# Patient Record
Sex: MALE | Race: WHITE | NOT HISPANIC OR LATINO | Employment: OTHER | URBAN - METROPOLITAN AREA
[De-identification: names, ages, dates, MRNs, and addresses within clinical notes are randomized per-mention and may not be internally consistent; named-entity substitution may affect disease eponyms.]

---

## 2022-05-10 ENCOUNTER — TRANSFERRED RECORDS (OUTPATIENT)
Dept: MULTI SPECIALTY CLINIC | Facility: CLINIC | Age: 62
End: 2022-05-10

## 2023-03-20 ENCOUNTER — OFFICE VISIT (OUTPATIENT)
Dept: FAMILY MEDICINE | Facility: CLINIC | Age: 63
End: 2023-03-20
Payer: COMMERCIAL

## 2023-03-20 VITALS
WEIGHT: 271.8 LBS | TEMPERATURE: 98.8 F | HEIGHT: 72 IN | HEART RATE: 65 BPM | RESPIRATION RATE: 18 BRPM | SYSTOLIC BLOOD PRESSURE: 134 MMHG | DIASTOLIC BLOOD PRESSURE: 82 MMHG | BODY MASS INDEX: 36.82 KG/M2 | OXYGEN SATURATION: 97 %

## 2023-03-20 DIAGNOSIS — E11.21 TYPE 2 DIABETES MELLITUS WITH DIABETIC NEPHROPATHY, WITHOUT LONG-TERM CURRENT USE OF INSULIN (H): Primary | ICD-10-CM

## 2023-03-20 DIAGNOSIS — G47.33 OBSTRUCTIVE SLEEP APNEA SYNDROME: ICD-10-CM

## 2023-03-20 DIAGNOSIS — F19.11 HISTORY OF SUBSTANCE ABUSE (H): ICD-10-CM

## 2023-03-20 DIAGNOSIS — Z85.820 HX OF MALIGNANT MELANOMA: ICD-10-CM

## 2023-03-20 DIAGNOSIS — F11.10 MILD OPIOID USE DISORDER (H): ICD-10-CM

## 2023-03-20 DIAGNOSIS — E66.01 MORBID OBESITY (H): ICD-10-CM

## 2023-03-20 LAB
CHOLEST SERPL-MCNC: 154 MG/DL
HBA1C MFR BLD: 6.9 % (ref 0–5.6)
HDLC SERPL-MCNC: 53 MG/DL
LDLC SERPL CALC-MCNC: 88 MG/DL
NONHDLC SERPL-MCNC: 101 MG/DL
TRIGL SERPL-MCNC: 66 MG/DL

## 2023-03-20 PROCEDURE — 99204 OFFICE O/P NEW MOD 45 MIN: CPT | Performed by: PHYSICIAN ASSISTANT

## 2023-03-20 PROCEDURE — 83036 HEMOGLOBIN GLYCOSYLATED A1C: CPT | Performed by: PHYSICIAN ASSISTANT

## 2023-03-20 PROCEDURE — 80061 LIPID PANEL: CPT | Performed by: PHYSICIAN ASSISTANT

## 2023-03-20 PROCEDURE — 36415 COLL VENOUS BLD VENIPUNCTURE: CPT | Performed by: PHYSICIAN ASSISTANT

## 2023-03-20 RX ORDER — BUPRENORPHINE AND NALOXONE 8; 2 MG/1; MG/1
1 FILM, SOLUBLE BUCCAL; SUBLINGUAL 3 TIMES DAILY
COMMUNITY
End: 2023-06-23

## 2023-03-20 RX ORDER — GABAPENTIN 300 MG/1
300 CAPSULE ORAL PRN
COMMUNITY
End: 2023-08-02

## 2023-03-20 ASSESSMENT — PAIN SCALES - GENERAL: PAINLEVEL: NO PAIN (0)

## 2023-03-20 NOTE — PROGRESS NOTES
"  Assessment & Plan     Type 2 diabetes mellitus with diabetic nephropathy, without long-term current use of insulin (H)  May be a candidate for statin if LDL above 70.  Will await labs  - Continuous Blood Gluc  (FREESTYLE VANNESA 2 READER) NANCY; Inject 1 each Subcutaneous once for 1 dose Use to read blood sugars per 's instructions.  - Continuous Blood Gluc Sensor (FREESTYLE VANNESA 2 SENSOR) MISC; 1 each by Subdermal route every 14 days Use 1 sensor every 14 days. Use to read blood sugars per 's instructions.  - Hemoglobin A1c; Future  - Lipid panel reflex to direct LDL Fasting; Future  - Lipid panel reflex to direct LDL Fasting  - Hemoglobin A1c    Obstructive sleep apnea syndrome    - Adult Sleep Eval & Management  Referral; Future    History of substance abuse (H)  7+ years being sober.  On suboxone therapy    Morbid obesity (H)  Discussed dietary and lifestyle changes    Mild opioid use disorder (H)  Discussed suboxone.  We may have a new protocol in place where we are able to continue.  I will look into and confirm with pt    Hx of malignant melanoma  Follows with derm               BMI:   Estimated body mass index is 37.34 kg/m  as calculated from the following:    Height as of this encounter: 1.817 m (5' 11.54\").    Weight as of this encounter: 123.3 kg (271 lb 12.8 oz).   Weight management plan: Discussed healthy diet and exercise guidelines        Cory Bond PA-C  [facility]    Latrice Duenas is a 62 year old, presenting for the following health issues:  Hypertension, Diabetes, Back Pain, and Establish Care      History of Present Illness       Back Pain:  He presents for follow up of back pain. Patient's back pain is a chronic problem.  Location of back pain:  Right lower back and left lower back  Description of back pain: fullness and stabbing  Back pain spreads: nowhere    Since patient first noticed back pain, pain is: gradually " "improving  Does back pain interfere with his job:  Yes      Diabetes:   He presents for follow up of diabetes.  He is not checking blood glucose. He is concerned about other.  He is having numbness in feet.         Hypertension: He presents for follow up of hypertension.  He does not check blood pressure  regularly outside of the clinic. Outside blood pressures have been over 140/90. He does not follow a low salt diet.     He eats 0-1 servings of fruits and vegetables daily.He consumes 1 sweetened beverage(s) daily.He exercises with enough effort to increase his heart rate 9 or less minutes per day.  He exercises with enough effort to increase his heart rate 3 or less days per week. He is missing 1 dose(s) of medications per week.  He is not taking prescribed medications regularly due to remembering to take.             Review of Systems   Constitutional, HEENT, cardiovascular, pulmonary, gi and gu systems are negative, except as otherwise noted.      Objective    /82   Pulse 65   Temp 98.8  F (37.1  C) (Temporal)   Resp 18   Ht 1.817 m (5' 11.54\")   Wt 123.3 kg (271 lb 12.8 oz)   SpO2 97%   BMI 37.34 kg/m    Body mass index is 37.34 kg/m .  Physical Exam   GENERAL: alert and no distress  EYES: Eyes grossly normal to inspection  RESP: lungs clear to auscultation - no rales, rhonchi or wheezes  CV: regular rate and rhythm, normal S1 S2, no S3 or S4, no murmur, click or rub, no peripheral edema and peripheral pulses strong                    "

## 2023-03-23 ENCOUNTER — TELEPHONE (OUTPATIENT)
Dept: FAMILY MEDICINE | Facility: CLINIC | Age: 63
End: 2023-03-23
Payer: COMMERCIAL

## 2023-03-23 NOTE — TELEPHONE ENCOUNTER
Patient Returning Call    Reason for call:  Patient is wondering if he can get a new mask before his appt for a consult? He took the first available appt but his mask is falling apart    Information relayed to patient:   I let him know that I would get this message over to the sleep clinic and someone would be in touch with him regarding the mask    Patient has additional questions:  No    What are your questions/concerns:      Okay to leave a detailed message?: Yes at Home number on file [phone] (home)

## 2023-03-24 ENCOUNTER — TELEPHONE (OUTPATIENT)
Dept: FAMILY MEDICINE | Facility: CLINIC | Age: 63
End: 2023-03-24
Payer: COMMERCIAL

## 2023-03-24 DIAGNOSIS — E11.21 TYPE 2 DIABETES MELLITUS WITH DIABETIC NEPHROPATHY, WITHOUT LONG-TERM CURRENT USE OF INSULIN (H): Primary | ICD-10-CM

## 2023-03-24 PROBLEM — Z85.820 HX OF MALIGNANT MELANOMA: Status: ACTIVE | Noted: 2023-03-24

## 2023-03-24 PROBLEM — G47.33 OBSTRUCTIVE SLEEP APNEA SYNDROME: Status: ACTIVE | Noted: 2023-03-24

## 2023-03-24 PROBLEM — F19.11 HISTORY OF SUBSTANCE ABUSE (H): Status: ACTIVE | Noted: 2023-03-24

## 2023-03-24 PROBLEM — E66.01 MORBID OBESITY (H): Status: ACTIVE | Noted: 2023-03-24

## 2023-03-24 PROBLEM — F11.10 MILD OPIOID USE DISORDER (H): Status: ACTIVE | Noted: 2023-03-24

## 2023-03-24 RX ORDER — ATORVASTATIN CALCIUM 10 MG/1
10 TABLET, FILM COATED ORAL DAILY
Qty: 90 TABLET | Refills: 3 | Status: SHIPPED | OUTPATIENT
Start: 2023-03-24 | End: 2023-07-05

## 2023-03-24 NOTE — RESULT ENCOUNTER NOTE
Please call with results.    A1c at goal at 6.9.  while cholesterol looks good, then recommendation is that all diabetics be on a low dose cholesterol medication.  Studies show that it helps prevent diabetic related complications.  I can call this in if he wishes to try.    I also looked into suboxone, and we can take over.  He will (unfortunately) need another in person visit to complete the requirements, so he can schedule that as well.    Roberto Bond PA-C

## 2023-03-24 NOTE — TELEPHONE ENCOUNTER
JS,    Updated patient   He would like to start the cholesterol medication  Also he scheduled for an appointment in April regarding Subxone  He has suboxone at home at the moment          Thanks,  Costa MOHR RN   [facility]

## 2023-03-24 NOTE — TELEPHONE ENCOUNTER
Cory Bond PA-C  P Up Triage  Please call with results.     A1c at goal at 6.9.  while cholesterol looks good, then recommendation is that all diabetics be on a low dose cholesterol medication.  Studies show that it helps prevent diabetic related complications.  I can call this in if he wishes to try.     I also looked into suboxone, and we can take over.  He will (unfortunately) need another in person visit to complete the requirements, so he can schedule that as well.     Roberto Bond PA-C

## 2023-03-29 NOTE — TELEPHONE ENCOUNTER
Spoke with patient. Advised provider is not able to write prescription for supplies prior to being seen. He will check with his supplier to see if he can pay out of pocket.     Nette BYRNE RN  [facility]

## 2023-04-20 ENCOUNTER — OFFICE VISIT (OUTPATIENT)
Dept: FAMILY MEDICINE | Facility: CLINIC | Age: 63
End: 2023-04-20
Payer: COMMERCIAL

## 2023-04-20 VITALS
TEMPERATURE: 97.9 F | SYSTOLIC BLOOD PRESSURE: 138 MMHG | HEIGHT: 72 IN | HEART RATE: 67 BPM | WEIGHT: 272 LBS | DIASTOLIC BLOOD PRESSURE: 82 MMHG | OXYGEN SATURATION: 97 % | BODY MASS INDEX: 36.84 KG/M2

## 2023-04-20 DIAGNOSIS — F11.20 OPIOID USE DISORDER, SEVERE, ON MAINTENANCE THERAPY (H): Primary | ICD-10-CM

## 2023-04-20 DIAGNOSIS — E11.21 TYPE 2 DIABETES MELLITUS WITH DIABETIC NEPHROPATHY, WITHOUT LONG-TERM CURRENT USE OF INSULIN (H): ICD-10-CM

## 2023-04-20 PROCEDURE — 99213 OFFICE O/P EST LOW 20 MIN: CPT | Performed by: PHYSICIAN ASSISTANT

## 2023-04-20 RX ORDER — BUPRENORPHINE AND NALOXONE 8; 2 MG/1; MG/1
1 FILM, SOLUBLE BUCCAL; SUBLINGUAL 3 TIMES DAILY
Qty: 90 FILM | Refills: 0 | Status: CANCELLED | OUTPATIENT
Start: 2023-04-20

## 2023-04-20 RX ORDER — BUPRENORPHINE AND NALOXONE 8; 2 MG/1; MG/1
1 FILM, SOLUBLE BUCCAL; SUBLINGUAL 3 TIMES DAILY
Qty: 90 FILM | Refills: 0 | Status: SHIPPED | OUTPATIENT
Start: 2023-04-20 | End: 2023-05-23

## 2023-04-20 ASSESSMENT — PAIN SCALES - GENERAL: PAINLEVEL: NO PAIN (0)

## 2023-04-20 NOTE — PROGRESS NOTES
Assessment & Plan     Opioid use disorder, severe, on maintenance therapy (H)    - buprenorphine HCl-naloxone HCl (SUBOXONE) 8-2 MG per film; Place 1 Film under the tongue 3 times daily    Type 2 diabetes mellitus with diabetic nephropathy, without long-term current use of insulin (H)  Has follow up in July , will update labs/urine              [facility]    Latrice Duenas is a 62 year old, presenting for the following health issues:  Recheck Medication         View : No data to display.              History of Present Illness       Reason for visit:  Med check    He eats 0-1 servings of fruits and vegetables daily.He consumes 1 sweetened beverage(s) daily.He exercises with enough effort to increase his heart rate 9 or less minutes per day.  He exercises with enough effort to increase his heart rate 3 or less days per week. He is missing 1 dose(s) of medications per week.  He is not taking prescribed medications regularly due to cost of medication and remembering to take.         Current Narcotic Use/History:  Which opioid(s) are you currently using, that are not already on your med list?: none ( been off for 8 years) ( takes suboxone)  How do you use your drug of choice? none  What is your estimated total dose (mg if pills, grams of heroin) per day? none  When did you last use? 01/06/2015  Have you ever tried to quit on your own? treatment  What have you done to try quiting in the past? treatment  What was the longest period of time you have been sober from opioids?: 8 years  When and how did you start using opioids? Back injury    Other Substance/Psychiatric History:  Have you been struggling with any other mental health symptoms?: depression  Any other drug use other than opioids?: None  Do you struggle with any other addictive behaviors (sex, gambling, internet, shopping, TV etc): food,gambling  Are you sexually active?: no       Family History:  Does  "anyone in your family have a history of a use disorder? Father - alcohol     Opioid Use Disorder Criteria:        View : No data to display.              PHQ Score:       View : No data to display.              GAD7 Score:        View : No data to display.                  PDMP Review       Value Time User    State PDMP site checked  Yes 4/20/2023  4:43 PM Cory Bond PA-C                  Review of Systems   Constitutional, HEENT, cardiovascular, pulmonary, gi and gu systems are negative, except as otherwise noted.      Objective    /82 (BP Location: Right arm, Patient Position: Sitting, Cuff Size: Adult Regular)   Pulse 67   Temp 97.9  F (36.6  C) (Temporal)   Ht 1.825 m (5' 11.85\")   Wt 123.4 kg (272 lb)   SpO2 97%   BMI 37.04 kg/m    Body mass index is 37.04 kg/m .  Physical Exam   GENERAL: alert and no distress  EYES: Eyes grossly normal to inspection  RESP: lungs clear to auscultation - no rales, rhonchi or wheezes  CV: regular rate and rhythm, normal S1 S2, no S3 or S4, no murmur, click or rub, no peripheral edema and peripheral pulses strong                      "

## 2023-05-23 DIAGNOSIS — F11.20 OPIOID USE DISORDER, SEVERE, ON MAINTENANCE THERAPY (H): ICD-10-CM

## 2023-05-23 RX ORDER — BUPRENORPHINE AND NALOXONE 8; 2 MG/1; MG/1
FILM, SOLUBLE BUCCAL; SUBLINGUAL
Qty: 90 FILM | Refills: 0 | Status: SHIPPED | OUTPATIENT
Start: 2023-05-23 | End: 2023-07-20

## 2023-06-23 DIAGNOSIS — F11.20 OPIOID USE DISORDER, SEVERE, ON MAINTENANCE THERAPY (H): Primary | ICD-10-CM

## 2023-06-23 RX ORDER — BUPRENORPHINE AND NALOXONE 8; 2 MG/1; MG/1
1 FILM, SOLUBLE BUCCAL; SUBLINGUAL 3 TIMES DAILY
Qty: 90 FILM | Refills: 0 | Status: SHIPPED | OUTPATIENT
Start: 2023-06-23 | End: 2023-07-05

## 2023-06-23 NOTE — TELEPHONE ENCOUNTER
Requested Prescriptions   Pending Prescriptions Disp Refills     buprenorphine HCl-naloxone HCl (SUBOXONE) 8-2 MG per film       Sig: Place 1 Film under the tongue 3 times daily       There is no refill protocol information for this order        Routing refill request to provider for review/approval because:  Drug not on the [facility] refill protocol     Luis Daniel Phoenix RN  [facility]

## 2023-07-05 ENCOUNTER — OFFICE VISIT (OUTPATIENT)
Dept: SLEEP MEDICINE | Facility: CLINIC | Age: 63
End: 2023-07-05
Attending: PHYSICIAN ASSISTANT
Payer: COMMERCIAL

## 2023-07-05 VITALS
HEIGHT: 72 IN | SYSTOLIC BLOOD PRESSURE: 162 MMHG | DIASTOLIC BLOOD PRESSURE: 84 MMHG | WEIGHT: 267.6 LBS | OXYGEN SATURATION: 96 % | BODY MASS INDEX: 36.24 KG/M2 | HEART RATE: 83 BPM

## 2023-07-05 DIAGNOSIS — G47.33 OBSTRUCTIVE SLEEP APNEA SYNDROME: Primary | ICD-10-CM

## 2023-07-05 PROCEDURE — 99204 OFFICE O/P NEW MOD 45 MIN: CPT | Performed by: INTERNAL MEDICINE

## 2023-07-05 ASSESSMENT — SLEEP AND FATIGUE QUESTIONNAIRES
HOW LIKELY ARE YOU TO NOD OFF OR FALL ASLEEP IN A CAR, WHILE STOPPED FOR A FEW MINUTES IN TRAFFIC: SLIGHT CHANCE OF DOZING
HOW LIKELY ARE YOU TO NOD OFF OR FALL ASLEEP WHILE SITTING QUIETLY AFTER LUNCH WITHOUT ALCOHOL: SLIGHT CHANCE OF DOZING
HOW LIKELY ARE YOU TO NOD OFF OR FALL ASLEEP WHILE LYING DOWN TO REST IN THE AFTERNOON WHEN CIRCUMSTANCES PERMIT: SLIGHT CHANCE OF DOZING
HOW LIKELY ARE YOU TO NOD OFF OR FALL ASLEEP WHEN YOU ARE A PASSENGER IN A CAR FOR AN HOUR WITHOUT A BREAK: MODERATE CHANCE OF DOZING
HOW LIKELY ARE YOU TO NOD OFF OR FALL ASLEEP WHILE SITTING AND TALKING TO SOMEONE: SLIGHT CHANCE OF DOZING
HOW LIKELY ARE YOU TO NOD OFF OR FALL ASLEEP WHILE WATCHING TV: MODERATE CHANCE OF DOZING
HOW LIKELY ARE YOU TO NOD OFF OR FALL ASLEEP WHILE SITTING AND READING: MODERATE CHANCE OF DOZING
HOW LIKELY ARE YOU TO NOD OFF OR FALL ASLEEP WHILE SITTING INACTIVE IN A PUBLIC PLACE: MODERATE CHANCE OF DOZING

## 2023-07-05 NOTE — PROGRESS NOTES
Sleep Consultation:    Date on this visit: 7/5/2023    Henrique Nathan  is referred by Cory Bond for a sleep consultation.     Primary Physician: Cory Bond     Henrique Nathan presents to clinic for management of sleep apnea.     His medical history is significant for elevated blood pressure, DM-2, and h/o opiate dependence, in remission.     Patient was diagnosed with severe obstructive sleep apnea on PSG from [facility].  PSG on 6/27/2016 showed an apnea-hypopnea index of 54/h.  CPAP titration was effective at 8 cm H2O but did not include supine REM.  Record of a titration PSG on 2/18/2021, which showed effective titration at 8 cm H2O including supine REM.    Patient is currently prescribed auto CPAP therapy with a pressure range of 8 to 10 cm H2O.    Overall, he rates the experience with PAP as ok. The mask is not comfortable. The mask is not leaking.  He is not snoring with the mask on. He is not having gasp arousals.  He is not having significant oral/nasal dryness. The pressure settings are comfortable.     He uses full-face mask.     [employer]Med     Auto-PAP 8-10 cmH2O download:  80/90 total days of use. 10 nonuse days. 58% days with >4 hours use.  Average use 4 hours 46 minutes per day. Median Leak 6 L/min. 95%ile Leak 19.8 L/min. CPAP 95% pressure 9.5cm. AHI 1.8    Henrique goes to sleep at 11:00 PM during the week. He wakes up at 6:00 AM. He falls asleep in 60 minutes.  Henrique has difficulty falling asleep.  He wakes up 2-4 times a night for 30 minutes before falling back to sleep.  Henrique wakes up to go to the bathroom and uncertain reasons.  On weekends, Henrique goes to sleep at 12:00 AM.  He wakes up at 7:00 AM. He falls asleep in 30- 60 minutes.  Patient gets an average of 5- 6 hours of sleep per night.     Henrique denies any sleep walking, dream enactment, sleep paralysis, cataplexy and hypnogogic/hypnopompic hallucinations.     Patient's [place] Sleepiness score 12/24  "consistent with mild daytime sleepiness.      Henrique naps 0 times per week. He takes some inadvertant naps.  He denies closing eyes, dozing and falling asleep while driving. Patient was counseled on the importance of driving while alert, to pull over if drowsy, or nap before getting into the vehicle if sleepy.      He uses 1 cups/day of coffee, 1 sodas/day. Last caffeine intake is usually before 4 PM.      Problem List:  Patient Active Problem List    Diagnosis Date Noted     History of substance abuse (H) 2023     Priority: Medium     Type 2 diabetes mellitus with diabetic nephropathy, without long-term current use of insulin (H) 2023     Priority: Medium     Obstructive sleep apnea syndrome 2023     Priority: Medium     Morbid obesity (H) 2023     Priority: Medium     Mild opioid use disorder (H) 2023     Priority: Medium     Hx of malignant melanoma 2023     Priority: Medium        Social History     Tobacco Use     Smoking status: Former     Types: Cigarettes     Quit date: 10/1/2021     Years since quittin.7     Smokeless tobacco: Never   Vaping Use     Vaping Use: Never used       Physical Examination:  Vitals: BP (!) 162/84   Pulse 83   Ht 1.817 m (5' 11.54\")   Wt 121.4 kg (267 lb 9.6 oz)   SpO2 96%   BMI 36.76 kg/m    BMI= Body mass index is 36.76 kg/m .  GENERAL APPEARANCE: healthy, alert and no distress  HENT: oropharynx crowded and tongue base enlarged  NEURO: mentation intact and speech normal  PSYCH: mentation appears normal and affect normal/bright  Mallampati Class: III.  Tonsillar Stage: 1  hidden by pillars.    Impression/Plan:    1. Severe obstructive sleep apnea    -Patient is currently on auto titrating CPAP therapy with a pressure range of 8 to 10 cm H2O for treatment of severe obstructive sleep apnea.  I reviewed sleep study reports from [facility], which includes a titration PSG that showed effective titration at 8 cm H2O.  Patient is using CPAP " regularly and benefits from therapy.  Review of download data from his device shows normal residual AHI at current auto CPAP settings.  Continuation of therapy is recommended. I reviewed importance of regular adherence to CPAP and using therapy throughout the night.    Plan:     1.  Continue auto titrating CPAP therapy with a pressure range of 8 to 10 cm H2O      Obstructive sleep apnea reviewed.  Complications of untreated sleep apnea were reviewed.    I spent a total of 45 minutes for this appointment on this date of service which include time spent before, during and after the visit for chart review, patient care, counseling and coordination of care.    Dr. Trevon Briceño       CC: Cory Bond

## 2023-07-05 NOTE — NURSING NOTE
"Chief Complaint   Patient presents with     Sleep Problem     Establish care and needs new mask       Initial BP (!) 162/84   Pulse 83   Ht 1.817 m (5' 11.54\")   Wt 121.4 kg (267 lb 9.6 oz)   SpO2 96%   BMI 36.76 kg/m   Estimated body mass index is 36.76 kg/m  as calculated from the following:    Height as of this encounter: 1.817 m (5' 11.54\").    Weight as of this encounter: 121.4 kg (267 lb 9.6 oz).    Medication Reconciliation: complete  ESS 12  Neck circumference: 47 centimeters.  Cathi Ibarra MA      "

## 2023-07-20 ENCOUNTER — OFFICE VISIT (OUTPATIENT)
Dept: FAMILY MEDICINE | Facility: CLINIC | Age: 63
End: 2023-07-20
Payer: COMMERCIAL

## 2023-07-20 VITALS
DIASTOLIC BLOOD PRESSURE: 81 MMHG | OXYGEN SATURATION: 98 % | HEART RATE: 76 BPM | TEMPERATURE: 98.1 F | RESPIRATION RATE: 18 BRPM | HEIGHT: 72 IN | BODY MASS INDEX: 36 KG/M2 | SYSTOLIC BLOOD PRESSURE: 166 MMHG | WEIGHT: 265.8 LBS

## 2023-07-20 DIAGNOSIS — E11.21 TYPE 2 DIABETES MELLITUS WITH DIABETIC NEPHROPATHY, WITHOUT LONG-TERM CURRENT USE OF INSULIN (H): ICD-10-CM

## 2023-07-20 DIAGNOSIS — M79.10 MYALGIA: ICD-10-CM

## 2023-07-20 DIAGNOSIS — F11.20 OPIOID USE DISORDER, SEVERE, ON MAINTENANCE THERAPY (H): Primary | ICD-10-CM

## 2023-07-20 DIAGNOSIS — I10 HYPERTENSION, UNSPECIFIED TYPE: ICD-10-CM

## 2023-07-20 PROCEDURE — 80359 METHYLENEDIOXYAMPHETAMINES: CPT | Performed by: PHYSICIAN ASSISTANT

## 2023-07-20 PROCEDURE — 80357 KETAMINE AND NORKETAMINE: CPT | Performed by: PHYSICIAN ASSISTANT

## 2023-07-20 PROCEDURE — 80346 BENZODIAZEPINES1-12: CPT | Performed by: PHYSICIAN ASSISTANT

## 2023-07-20 PROCEDURE — 80354 DRUG SCREENING FENTANYL: CPT | Performed by: PHYSICIAN ASSISTANT

## 2023-07-20 PROCEDURE — 80355 GABAPENTIN NON-BLOOD: CPT | Performed by: PHYSICIAN ASSISTANT

## 2023-07-20 PROCEDURE — 99214 OFFICE O/P EST MOD 30 MIN: CPT | Performed by: PHYSICIAN ASSISTANT

## 2023-07-20 PROCEDURE — 80353 DRUG SCREENING COCAINE: CPT | Performed by: PHYSICIAN ASSISTANT

## 2023-07-20 PROCEDURE — 80372 DRUG SCREENING TAPENTADOL: CPT | Performed by: PHYSICIAN ASSISTANT

## 2023-07-20 PROCEDURE — 80365 DRUG SCREENING OXYCODONE: CPT | Performed by: PHYSICIAN ASSISTANT

## 2023-07-20 PROCEDURE — 80366 DRUG SCREENING PREGABALIN: CPT | Performed by: PHYSICIAN ASSISTANT

## 2023-07-20 PROCEDURE — 80324 DRUG SCREEN AMPHETAMINES 1/2: CPT | Performed by: PHYSICIAN ASSISTANT

## 2023-07-20 PROCEDURE — 82043 UR ALBUMIN QUANTITATIVE: CPT | Performed by: PHYSICIAN ASSISTANT

## 2023-07-20 PROCEDURE — 80361 OPIATES 1 OR MORE: CPT | Performed by: PHYSICIAN ASSISTANT

## 2023-07-20 PROCEDURE — 80367 DRUG SCREENING PROPOXYPHENE: CPT | Performed by: PHYSICIAN ASSISTANT

## 2023-07-20 PROCEDURE — 80363 OPIOIDS & OPIATE ANALOGS 3/4: CPT | Performed by: PHYSICIAN ASSISTANT

## 2023-07-20 PROCEDURE — 80373 DRUG SCREENING TRAMADOL: CPT | Performed by: PHYSICIAN ASSISTANT

## 2023-07-20 PROCEDURE — 80360 METHYLPHENIDATE: CPT | Performed by: PHYSICIAN ASSISTANT

## 2023-07-20 PROCEDURE — 80348 DRUG SCREENING BUPRENORPHINE: CPT | Performed by: PHYSICIAN ASSISTANT

## 2023-07-20 PROCEDURE — 83992 ASSAY FOR PHENCYCLIDINE: CPT | Performed by: PHYSICIAN ASSISTANT

## 2023-07-20 PROCEDURE — 80356 HEROIN METABOLITE: CPT | Performed by: PHYSICIAN ASSISTANT

## 2023-07-20 PROCEDURE — 82570 ASSAY OF URINE CREATININE: CPT | Performed by: PHYSICIAN ASSISTANT

## 2023-07-20 PROCEDURE — 80358 DRUG SCREENING METHADONE: CPT | Performed by: PHYSICIAN ASSISTANT

## 2023-07-20 RX ORDER — LANCETS
EACH MISCELLANEOUS
Qty: 100 EACH | Refills: 6 | Status: SHIPPED | OUTPATIENT
Start: 2023-07-20

## 2023-07-20 RX ORDER — LISINOPRIL 20 MG/1
20 TABLET ORAL DAILY
Qty: 90 TABLET | Refills: 1 | Status: SHIPPED | OUTPATIENT
Start: 2023-07-20 | End: 2024-03-12

## 2023-07-20 RX ORDER — DULOXETIN HYDROCHLORIDE 30 MG/1
30 CAPSULE, DELAYED RELEASE ORAL DAILY
Qty: 90 CAPSULE | Refills: 1 | Status: SHIPPED | OUTPATIENT
Start: 2023-07-20 | End: 2024-03-12

## 2023-07-20 RX ORDER — BUPRENORPHINE AND NALOXONE 8; 2 MG/1; MG/1
FILM, SOLUBLE BUCCAL; SUBLINGUAL
Qty: 90 FILM | Refills: 1 | Status: SHIPPED | OUTPATIENT
Start: 2023-07-20 | End: 2023-09-07

## 2023-07-20 RX ORDER — ATORVASTATIN CALCIUM 10 MG/1
10 TABLET, FILM COATED ORAL DAILY
Qty: 90 TABLET | Refills: 1 | Status: SHIPPED | OUTPATIENT
Start: 2023-07-20 | End: 2024-03-12

## 2023-07-20 ASSESSMENT — PAIN SCALES - GENERAL: PAINLEVEL: MODERATE PAIN (4)

## 2023-07-20 NOTE — PROGRESS NOTES
Assessment & Plan     Opioid use disorder, severe, on maintenance therapy (H)    - buprenorphine HCl-naloxone HCl (SUBOXONE) 8-2 MG per film; PLACE 1 FILM UNDER THE TONGUE THREE TIMES DAILY  - Drug Confirmation Panel Urine with Creat - lab collect; Future  - Drug Confirmation Panel Urine with Creat - lab collect    Type 2 diabetes mellitus with diabetic nephropathy, without long-term current use of insulin (H)    - Albumin Random Urine Quantitative with Creat Ratio; Future  - blood glucose monitoring (NO BRAND SPECIFIED) meter device kit; Use to test blood sugar 1 times daily or as directed. Preferred blood glucose meter OR supplies to accompany: Blood Glucose Monitor Brands: per insurance.  - blood glucose (NO BRAND SPECIFIED) test strip; Use to test blood sugar 1 times daily or as directed. To accompany: Blood Glucose Monitor Brands: per insurance.  - thin (NO BRAND SPECIFIED) lancets; Use with lanceting device. To accompany: Blood Glucose Monitor Brands: per insurance.  - atorvastatin (LIPITOR) 10 MG tablet; Take 1 tablet (10 mg) by mouth daily  - Albumin Random Urine Quantitative with Creat Ratio    Hypertension, unspecified type  Blood pressure elevated, will initiate treatment.  - lisinopril (ZESTRIL) 20 MG tablet; Take 1 tablet (20 mg) by mouth daily    Myalgia  He was on this in the past, and does think it helped his overall aches and pains.  - DULoxetine (CYMBALTA) 30 MG capsule; Take 1 capsule (30 mg) by mouth daily                 Cory Bond PA-C  [facility]    Latrice Duenas is a 62 year old, presenting for the following health issues:  Opioid Refill        4/20/2023     4:14 PM   Additional Questions   Roomed by darrin greenberg   Accompanied by n/a     History of Present Illness       Reason for visit:  Pain in arms blood presure  Symptom onset:  3-4 weeks ago  Symptoms include:  Pain after working  Symptom intensity:  Moderate  Symptom progression:  Staying the  "same  Had these symptoms before:  No  What makes it worse:  No  What makes it better:  Rest    He eats 0-1 servings of fruits and vegetables daily.He consumes 1 sweetened beverage(s) daily.He exercises with enough effort to increase his heart rate 10 to 19 minutes per day.  He exercises with enough effort to increase his heart rate 3 or less days per week. He is missing 1 dose(s) of medications per week.  He is not taking prescribed medications regularly due to remembering to take.         He is currently taking 24 mg of buprenorphine daily. This is taken in 3 dose(s) daily.     Status Since Last Visit:  Have you used any opioids since your last visit?: no use since last visit  Do you feel that your dose of suboxone is too high or too low? Adequate  Have there been cravings for opioids? No   Any withdrawal symptoms?  none     Any side effects from the medication?  none . Occasional sweating  Any alcohol use? none  Any other recreational drug use? none    Precipitating Factors:  Triggers have been: non-existent   Other Supports:  Do you attend counseling or meet with a therapist? Yes  Do you attend NA or AA meetings? Yes  Do you have/meet with a sponsor? No  Family and support systems have been: stable  What other goals have you been working on (job, family, relationships, etc)?         PDMP Review         Value Time User    State PDMP site checked  Yes 6/23/2023  3:06 PM Cory Bond PA-C                    Review of Systems   Constitutional, HEENT, cardiovascular, pulmonary, gi and gu systems are negative, except as otherwise noted.      Objective    BP (!) 166/81   Pulse 76   Temp 98.1  F (36.7  C) (Temporal)   Resp 18   Ht 1.817 m (5' 11.54\")   Wt 120.6 kg (265 lb 12.8 oz)   SpO2 98%   BMI 36.51 kg/m    Body mass index is 36.51 kg/m .  Physical Exam   GENERAL: alert and no distress  HENT: ear canals and TM's normal, nose and mouth without ulcers or lesions  RESP: lungs clear to auscultation - no " rales, rhonchi or wheezes  CV: regular rate and rhythm, normal S1 S2, no S3 or S4, no murmur, click or rub, no peripheral edema and peripheral pulses strong  PSYCH: mentation appears normal, affect normal/bright                     Libtayo Counseling- I discussed with the patient the risks of Libtayo including but not limited to nausea, vomiting, diarrhea, and bone or muscle pain.  The patient verbalized understanding of the proper use and possible adverse effects of Libtayo.  All of the patient's questions and concerns were addressed.

## 2023-07-21 LAB
CREAT UR-MCNC: 139 MG/DL
CREAT UR-MCNC: 139 MG/DL
MICROALBUMIN UR-MCNC: 102 MG/L
MICROALBUMIN/CREAT UR: 73.38 MG/G CR (ref 0–17)

## 2023-07-25 LAB
BUPRENORPHINE UR CFM-MCNC: 442 NG/ML
BUPRENORPHINE/CREAT UR: 318 NG/MG {CREAT}
NALOXONE UR CFM-MCNC: 1138 NG/ML
NALOXONE: 819 NG/MG {CREAT}
NORBUPRENORPHINE UR CFM-MCNC: 644 NG/ML
NORBUPRENORPHINE/CREAT UR: 463 NG/MG {CREAT}

## 2023-08-01 ENCOUNTER — TELEPHONE (OUTPATIENT)
Dept: OPHTHALMOLOGY | Facility: CLINIC | Age: 63
End: 2023-08-01
Payer: COMMERCIAL

## 2023-08-01 ENCOUNTER — NURSE TRIAGE (OUTPATIENT)
Dept: FAMILY MEDICINE | Facility: CLINIC | Age: 63
End: 2023-08-01
Payer: COMMERCIAL

## 2023-08-01 NOTE — TELEPHONE ENCOUNTER
Reason for Call:  Appointment Request    Patient requesting this type of appt:  In Person    Requested provider: Cory Bond    Reason patient unable to be scheduled: Not within requested timeframe    When does patient want to be seen/preferred time: 3-7 days    Comments: Patient called to schedule an appt with Cory Bond (PCP) for flashing in his left eye and also dermatology concerns/referral. Patient is scheduled for first available on 9/7/23 but is hoping to be seen sooner. Please call to schedule sooner if we can, thank you!    Okay to leave a detailed message?: Yes at Home number on file [phone] (home)    Call taken on 8/1/2023 at 2:27 PM by Lorraine Cannon

## 2023-08-01 NOTE — TELEPHONE ENCOUNTER
Spoke to patient  Has been experiencing L eye flashing, worse at night, for the past week  Huddled with PCP  Recommended [place] [facility] possible same day or next day appointment  Provided clinic number - [phone]  Pt will call back if needing a referral or if clinic unable to schedule today or tomorrow  Bela TRIPLETT RN

## 2023-08-01 NOTE — TELEPHONE ENCOUNTER
Reason for Disposition    Flashes of light  (Exception: brief from pressing on the eyeball)    Additional Information    Negative: Severe headache    Negative: Double vision    Negative: Blurred vision or visual changes and present now and sudden onset or new (e.g., minutes, hours, days)  (Exception: Seeing floaters / black specks OR previously diagnosed migraine headaches with same symptoms.)    Negative: Patient sounds very sick or weak to the triager    Negative: Weakness of the face, arm or leg on one side of the body    Negative: Followed getting substance in the eye    Negative: Foreign body or object is or was lodged in the eye    Negative: Followed an eye injury    Negative: Followed sun lamp or sun exposure (UV keratitis)    Negative: Yellow or green discharge (pus) in the eye    Negative: Pregnant    Negative: Complete loss of vision in 1 or both eyes    Negative: SEVERE eye pain    Protocols used: Vision Loss or Change-A-OH

## 2023-08-01 NOTE — TELEPHONE ENCOUNTER
[facility] Call Center    Phone Message    May a detailed message be left on voicemail: yes     Reason for Call: Appointment Intake    Referring Provider Name:   Diagnosis and/or Symptoms: Flashes, left eye     Per protocol writer to send te    Action Taken: Message routed to:  [facility]    Travel Screening: Not Applicable

## 2023-08-01 NOTE — TELEPHONE ENCOUNTER
Spoke to pt at 1650    New left eye flashing in left eye starting about 1-2 weeks.    Noticed more in dim light-- noticed intermittently.    H/o floaters, but no new floaters    Vision stable otherwise    --h/o DM II and pt states due for eye exam    Scheduled tomorrow in open new time slot with Dr. Wray.    Pt aware of date/time/location at [facility].    Jose A Robertson RN 4:55 PM 08/01/23      .

## 2023-08-02 ENCOUNTER — OFFICE VISIT (OUTPATIENT)
Dept: OPHTHALMOLOGY | Facility: CLINIC | Age: 63
End: 2023-08-02
Attending: OPHTHALMOLOGY
Payer: COMMERCIAL

## 2023-08-02 DIAGNOSIS — H52.4 HYPEROPIA OF BOTH EYES WITH ASTIGMATISM AND PRESBYOPIA: ICD-10-CM

## 2023-08-02 DIAGNOSIS — H04.123 DRY EYE SYNDROME OF BOTH EYES: ICD-10-CM

## 2023-08-02 DIAGNOSIS — H52.203 HYPEROPIA OF BOTH EYES WITH ASTIGMATISM AND PRESBYOPIA: ICD-10-CM

## 2023-08-02 DIAGNOSIS — D31.32 CHOROIDAL NEVUS OF LEFT EYE: Primary | ICD-10-CM

## 2023-08-02 DIAGNOSIS — E11.9 TYPE 2 DIABETES MELLITUS WITHOUT RETINOPATHY (H): ICD-10-CM

## 2023-08-02 DIAGNOSIS — H52.03 HYPEROPIA OF BOTH EYES WITH ASTIGMATISM AND PRESBYOPIA: ICD-10-CM

## 2023-08-02 PROCEDURE — 92004 COMPRE OPH EXAM NEW PT 1/>: CPT | Performed by: OPHTHALMOLOGY

## 2023-08-02 PROCEDURE — 99207 FUNDUS PHOTOS OU (BOTH EYES): CPT | Mod: 26 | Performed by: OPHTHALMOLOGY

## 2023-08-02 PROCEDURE — 92250 FUNDUS PHOTOGRAPHY W/I&R: CPT | Performed by: OPHTHALMOLOGY

## 2023-08-02 PROCEDURE — G0463 HOSPITAL OUTPT CLINIC VISIT: HCPCS | Performed by: OPHTHALMOLOGY

## 2023-08-02 PROCEDURE — 92134 CPTRZ OPH DX IMG PST SGM RTA: CPT | Performed by: OPHTHALMOLOGY

## 2023-08-02 ASSESSMENT — VISUAL ACUITY
OS_SC+: -1
METHOD: SNELLEN - LINEAR
OS_SC: 20/20
OD_SC: 20/20

## 2023-08-02 ASSESSMENT — CONF VISUAL FIELD
OD_SUPERIOR_NASAL_RESTRICTION: 0
OS_INFERIOR_TEMPORAL_RESTRICTION: 0
OD_NORMAL: 1
OS_NORMAL: 1
OD_INFERIOR_TEMPORAL_RESTRICTION: 0
METHOD: COUNTING FINGERS
OS_SUPERIOR_NASAL_RESTRICTION: 0
OS_SUPERIOR_TEMPORAL_RESTRICTION: 0
OS_INFERIOR_NASAL_RESTRICTION: 0
OD_SUPERIOR_TEMPORAL_RESTRICTION: 0
OD_INFERIOR_NASAL_RESTRICTION: 0

## 2023-08-02 ASSESSMENT — TONOMETRY
IOP_METHOD: ICARE
OS_IOP_MMHG: 20
OD_IOP_MMHG: 21

## 2023-08-02 ASSESSMENT — REFRACTION_MANIFEST
OD_SPHERE: +0.75
OS_AXIS: 175
OS_ADD: +2.50
OD_AXIS: 180
OS_SPHERE: +0.75
OD_ADD: +2.50
OD_CYLINDER: +1.00
OS_CYLINDER: +1.25

## 2023-08-02 ASSESSMENT — CUP TO DISC RATIO
OD_RATIO: 0.25
OS_RATIO: 0.25

## 2023-08-02 ASSESSMENT — SLIT LAMP EXAM - LIDS
COMMENTS: 1+ DERMATOCHALASIS
COMMENTS: 1+ DERMATOCHALASIS

## 2023-08-02 ASSESSMENT — EXTERNAL EXAM - RIGHT EYE: OD_EXAM: NORMAL

## 2023-08-02 ASSESSMENT — EXTERNAL EXAM - LEFT EYE: OS_EXAM: NORMAL

## 2023-08-02 NOTE — NURSING NOTE
Chief Complaints and History of Present Illnesses   Patient presents with    Eye Exam For Diabetes     Chief Complaint(s) and History of Present Illness(es)       Eye Exam For Diabetes              Laterality: both eyes    Quality: blurred vision    Associated symptoms: flashes and floaters.  Negative for eye pain    Treatments tried: no treatments              Comments    Henrique Nathan is a(n) 62 year old male who presents for a diabetic exam. Last eye exam was many year(s) ago. Since exam, vision is blurry. Denies using lubricating drops. Intermittent flashes and floaters x2 weeks in both eyes, left>right. No eye pain.     No hx of ocular sx.     Lab Results       Component                Value               Date                       A1C                      6.9                 03/20/2023              Chago Jayde COT 9:25 AM August 2, 2023

## 2023-08-03 NOTE — PROGRESS NOTES
HPI       Eye Exam For Diabetes    In both eyes.  Charactertized as  blurred vision.  Associated symptoms include flashes and floaters.  Negative for eye pain.  Treatments tried include no treatments.             Comments    Henrique Nathan is a(n) 62 year old male who presents for a diabetic exam. Last eye exam was many year(s) ago. Since exam, vision is blurry. Denies using lubricating drops. Intermittent flashes and floaters x2 weeks in both eyes, left>right. Lasts seconds. No eye pain.     No hx of ocular sx.     Lab Results       Component                Value               Date                       A1C                      6.9                 03/20/2023              Chago Jayde COT 9:25 AM August 2, 2023     denies family history of ocular conditions   denies history of ocular surgeries   Diabetes mellitus since ~2020    History of melanoma with excision ~2010          Last edited by Jose A Wray MD on 8/2/2023 10:43 AM.         Review of systems for the eyes was negative other than the pertinent positives/negatives listed in the HPI.      Assessment & Plan    HPI:  Henrique Nathan is a 62 year old male with history of T2DM, HTN, HLD, cutaneous melanoma of mid-back s/p excision and sentinel lymph node biopsy 1/21/13 presents for a dilated eye exam. He notes occasional flashes, left eye > right eye . Lasts seconds. No curtain coming down or up across vision. NO redness, tearing.    POHx: denies  PMHx: T2DM, HTN, HLD, cutaneous melanoma   Current Medications: atorvastatin (LIPITOR) 10 MG tablet, Take 1 tablet (10 mg) by mouth daily  blood glucose (NO BRAND SPECIFIED) test strip, Use to test blood sugar 1 times daily or as directed. To accompany: Blood Glucose Monitor Brands: per insurance.  blood glucose monitoring (NO BRAND SPECIFIED) meter device kit, Use to test blood sugar 1 times daily or as directed. Preferred blood glucose meter OR supplies to accompany: Blood Glucose Monitor Brands: per  insurance.  buprenorphine HCl-naloxone HCl (SUBOXONE) 8-2 MG per film, PLACE 1 FILM UNDER THE TONGUE THREE TIMES DAILY  DULoxetine (CYMBALTA) 30 MG capsule, Take 1 capsule (30 mg) by mouth daily  lisinopril (ZESTRIL) 20 MG tablet, Take 1 tablet (20 mg) by mouth daily  metFORMIN (GLUCOPHAGE) 500 MG tablet, Take 500 mg by mouth 2 times daily (with meals)  thin (NO BRAND SPECIFIED) lancets, Use with lanceting device. To accompany: Blood Glucose Monitor Brands: per insurance.    No current facility-administered medications on file prior to visit.    FHx: denies family history of ocular conditions   PSHx: denies history of ocular surgeries       Current Eye Medications:      Assessment & Plan:  (D31.32) Choroidal nevus of left eye  (primary encounter diagnosis)  With drusen, no orange pigment or subretinal fluid. There is slight choroidal thickening on focused oct  Given history of mid-back melanoma 2013 and symptoms of flashing left eye, will have patient see retina for possible FA  Optos and oct photos obtained today    (H52.03,  H52.203,  H52.4) Hyperopia of both eyes with astigmatism and presbyopia  Patient has minimal hyperopia that does not require treatment but a copy of today's glasses prescription was given.  Presbyopia is difficulty seeing up close and is treated with bifocals or over the counter reading glasses      (E11.9) Type 2 diabetes mellitus without retinopathy (H)  Diagnosed 2020  Most recent HgBA1c 6.9 on 3/20/23  No background diabetic retinopathy or neovascularization noted on today's exam.  Discussed ocular and systemic benefits of blood pressure and blood sugar control.  Return in 1 year for full exam with dilation or sooner if changes to vision.       (H04.123) Dry eye syndrome of both eyes  Recommend artificial tears four times a day        Return in about 6 weeks (around 9/13/2023) for retina-preferably naina, OCT Macula.        Jose A Wray MD     Attending Physician Attestation:   Complete documentation of historical and exam elements from today's encounter can be found in the full encounter summary report (not reduplicated in this progress note).  I personally obtained the chief complaint(s) and history of present illness.  I confirmed and edited as necessary the review of systems, past medical/surgical history, family history, social history, and examination findings as documented by others; and I examined the patient myself.  I personally reviewed the relevant tests, images, and reports as documented above.  I formulated and edited as necessary the assessment and plan and discussed the findings and management plan with the patient and family. - Jose A Wray MD

## 2023-09-07 ENCOUNTER — OFFICE VISIT (OUTPATIENT)
Dept: FAMILY MEDICINE | Facility: CLINIC | Age: 63
End: 2023-09-07
Payer: COMMERCIAL

## 2023-09-07 VITALS
WEIGHT: 263.5 LBS | RESPIRATION RATE: 16 BRPM | HEIGHT: 71 IN | OXYGEN SATURATION: 96 % | TEMPERATURE: 98.1 F | DIASTOLIC BLOOD PRESSURE: 81 MMHG | SYSTOLIC BLOOD PRESSURE: 134 MMHG | HEART RATE: 79 BPM | BODY MASS INDEX: 36.89 KG/M2

## 2023-09-07 DIAGNOSIS — I10 HYPERTENSION, UNSPECIFIED TYPE: ICD-10-CM

## 2023-09-07 DIAGNOSIS — F11.20 OPIOID USE DISORDER, SEVERE, ON MAINTENANCE THERAPY (H): ICD-10-CM

## 2023-09-07 DIAGNOSIS — Z85.820 HX OF MALIGNANT MELANOMA: ICD-10-CM

## 2023-09-07 DIAGNOSIS — L98.9 SKIN LESION: Primary | ICD-10-CM

## 2023-09-07 DIAGNOSIS — E11.21 TYPE 2 DIABETES MELLITUS WITH DIABETIC NEPHROPATHY, WITHOUT LONG-TERM CURRENT USE OF INSULIN (H): ICD-10-CM

## 2023-09-07 PROCEDURE — 99214 OFFICE O/P EST MOD 30 MIN: CPT | Performed by: PHYSICIAN ASSISTANT

## 2023-09-07 RX ORDER — BUPRENORPHINE AND NALOXONE 8; 2 MG/1; MG/1
FILM, SOLUBLE BUCCAL; SUBLINGUAL
Qty: 90 FILM | Refills: 1 | Status: SHIPPED | OUTPATIENT
Start: 2023-09-17 | End: 2023-12-12

## 2023-09-07 ASSESSMENT — ENCOUNTER SYMPTOMS: EYE PAIN: 1

## 2023-09-07 ASSESSMENT — PAIN SCALES - GENERAL: PAINLEVEL: NO PAIN (0)

## 2023-09-07 NOTE — PROGRESS NOTES
"  Assessment & Plan     Skin lesion  Due to establish with derm with hx of melanoma  - Adult Dermatology Referral; Future    Hx of malignant melanoma  As above  - Adult Dermatology Referral; Future    Type 2 diabetes mellitus with diabetic nephropathy, without long-term current use of insulin (H)  stable    Opioid use disorder, severe, on maintenance therapy (H)  Stable, doing well.  Adding the cymbalta back in has helped his pain  - buprenorphine HCl-naloxone HCl (SUBOXONE) 8-2 MG per film; PLACE 1 FILM UNDER THE TONGUE THREE TIMES DAILY    Hypertension, unspecified type  At goal on lisinopril.  Overall feels better on it.             BMI:   Estimated body mass index is 36.28 kg/m  as calculated from the following:    Height as of this encounter: 1.815 m (5' 11.46\").    Weight as of this encounter: 119.5 kg (263 lb 8 oz).   Weight management plan: Discussed healthy diet and exercise guidelines        TINO Macias [facility]    Latrice Duenas is a 62 year old, presenting for the following health issues:  Referral, Mole, and Derm Problem        9/7/2023     2:39 PM   Additional Questions   Roomed by Maya DOWNING   Accompanied by n/a       History of Present Illness       Reason for visit:  Check for dermatolgy skin    He eats 0-1 servings of fruits and vegetables daily.He consumes 1 sweetened beverage(s) daily.He exercises with enough effort to increase his heart rate 10 to 19 minutes per day.  He exercises with enough effort to increase his heart rate 3 or less days per week. He is missing 1 dose(s) of medications per week.       Concern - Moles    Onset: Whole life   Description: Small round moles followed by itch and pain  Intensity: moderate  Hypertension Follow-up    Do you check your blood pressure regularly outside of the clinic? No   Are you following a low salt diet? No  Are your blood pressures ever more than 140 on the top number (systolic) OR more   than 90 on the " "bottom number (diastolic), for example 140/90? No    He is currently taking 24 mg of buprenorphine daily. This is taken in 3 dose(s) daily.     Status Since Last Visit:  Have you used any opioids since your last visit?: no use since last visit  Do you feel that your dose of suboxone is too high or too low? Adequate  Have there been cravings for opioids? No   Any withdrawal symptoms?  no     Any side effects from the medication?  no  Any alcohol use? no  Any other recreational drug use? no    Precipitating Factors:  Triggers have been: non-existent   Other Supports:  Do you attend counseling or meet with a therapist? No  Do you attend NA or AA meetings? No  Do you have/meet with a sponsor? No  Family and support systems have been: stable  What other goals have you been working on (job, family, relationships, etc)?         PDMP Review         Value Time User    State PDMP site checked  Yes 9/7/2023  3:16 PM Cory Bond PA-C                  Review of Systems   Eyes:  Positive for pain.            Objective    /81 (BP Location: Left arm, Patient Position: Sitting, Cuff Size: Adult Large)   Pulse 79   Temp 98.1  F (36.7  C) (Temporal)   Resp 16   Ht 1.815 m (5' 11.46\")   Wt 119.5 kg (263 lb 8 oz)   SpO2 96%   BMI 36.28 kg/m    Body mass index is 36.28 kg/m .  Physical Exam   GENERAL: alert and no distress  EYES: Eyes grossly normal to inspection  RESP: lungs clear to auscultation - no rales, rhonchi or wheezes  CV: regular rate and rhythm, normal S1 S2, no S3 or S4, no murmur, click or rub, no peripheral edema and peripheral pulses strong  PSYCH: mentation appears normal, affect normal/bright                      "

## 2023-09-12 DIAGNOSIS — D31.32 CHOROIDAL NEVUS OF LEFT EYE: Primary | ICD-10-CM

## 2023-09-19 ENCOUNTER — OFFICE VISIT (OUTPATIENT)
Dept: OPHTHALMOLOGY | Facility: CLINIC | Age: 63
End: 2023-09-19
Attending: OPHTHALMOLOGY
Payer: COMMERCIAL

## 2023-09-19 DIAGNOSIS — D31.32 CHOROIDAL NEVUS OF LEFT EYE: ICD-10-CM

## 2023-09-19 PROCEDURE — 99207 FUNDUS PHOTOS OU (BOTH EYES): CPT | Mod: 26 | Performed by: OPHTHALMOLOGY

## 2023-09-19 PROCEDURE — G0463 HOSPITAL OUTPT CLINIC VISIT: HCPCS | Performed by: OPHTHALMOLOGY

## 2023-09-19 PROCEDURE — 92134 CPTRZ OPH DX IMG PST SGM RTA: CPT | Performed by: OPHTHALMOLOGY

## 2023-09-19 PROCEDURE — 99214 OFFICE O/P EST MOD 30 MIN: CPT | Mod: GC | Performed by: OPHTHALMOLOGY

## 2023-09-19 PROCEDURE — 92250 FUNDUS PHOTOGRAPHY W/I&R: CPT | Performed by: OPHTHALMOLOGY

## 2023-09-19 ASSESSMENT — CONF VISUAL FIELD
OS_INFERIOR_NASAL_RESTRICTION: 0
OS_SUPERIOR_TEMPORAL_RESTRICTION: 0
OD_INFERIOR_TEMPORAL_RESTRICTION: 0
OS_INFERIOR_TEMPORAL_RESTRICTION: 0
OD_SUPERIOR_NASAL_RESTRICTION: 0
METHOD: COUNTING FINGERS
OD_NORMAL: 1
OS_SUPERIOR_NASAL_RESTRICTION: 0
OD_SUPERIOR_TEMPORAL_RESTRICTION: 0
OS_NORMAL: 1
OD_INFERIOR_NASAL_RESTRICTION: 0

## 2023-09-19 ASSESSMENT — TONOMETRY
OD_IOP_MMHG: 17
OS_IOP_MMHG: 15
IOP_METHOD: TONOPEN

## 2023-09-19 ASSESSMENT — VISUAL ACUITY
OS_SC+: -1
METHOD: SNELLEN - LINEAR
OD_SC+: -2
OS_SC: 20/20
OD_SC: 20/25

## 2023-09-19 ASSESSMENT — EXTERNAL EXAM - LEFT EYE: OS_EXAM: NORMAL

## 2023-09-19 ASSESSMENT — CUP TO DISC RATIO
OS_RATIO: 0.25
OD_RATIO: 0.3

## 2023-09-19 ASSESSMENT — EXTERNAL EXAM - RIGHT EYE: OD_EXAM: NORMAL

## 2023-09-19 ASSESSMENT — SLIT LAMP EXAM - LIDS
COMMENTS: 1+ DERMATOCHALASIS
COMMENTS: 1+ DERMATOCHALASIS

## 2023-09-19 NOTE — NURSING NOTE
Chief Complaints and History of Present Illnesses   Patient presents with    Retinal Evaluation     Here for Choroidal nevus of left eye     Chief Complaint(s) and History of Present Illness(es)       Retinal Evaluation              Laterality: left eye    Associated symptoms: flashes and floaters.  Negative for eye pain    Pain scale: 0/10    Comments: Here for Choroidal nevus of left eye              Comments    H/o mid-back melanoma 2013  Noticing some flashes of light over the last few months LE>RE   Occasional floaters both eyes.   No eye pain today, gets occasional headache/pain inside the eye(3/10).    DM2  LBS : Does not monitor  Lab Results       Component                Value               Date                       A1C                      6.9                 03/20/2023               Jmibo Ho 1:18 PM September 19, 2023

## 2023-09-19 NOTE — PROGRESS NOTES
CC -   Choroidal nevus left eye       INTERVAL HISTORY - Initial visit with me, referred by Nils, no changes since then    PMH -   Henrique Nathan is a  63 year old year-old patient with choroidal nevus OS noted by Nils 8/2023, first eye exam patient ever recalls      PAST OCULAR SURGERY  None    RETINAL IMAGING:  OCT 09/19/2023  OD - retina normal, PVD  OS - macula retina normal, PVD  nevus - thin lesion no fluid + drusen no elevation        ASSESSMENT & PLAN      #Choroidal nevus left eye x 2   - noted 8/2023 by Nils no prior exams   - small nevus inferior to ONH & tiny pig spot under RAMSES   - no high risk features   - recheck 1 year with Nils      # DM II no DR   - BP/BG control   - seen by Nils      # PVD OU   - advised S/Sx RD 9/2023      # mild NS OU  - sees Nils    No follow-ups on file.     Siva Peters MD  PGY-3 Ophthalmology  Department of Ophthalmology   [facility]      ATTESTATION     Attending Attestation:     Complete documentation of historical and exam elements from today's encounter can be found in the full encounter summary report (not reduplicated in this progress note).  I personally obtained the chief complaint(s) and history of present illness.  I confirmed and edited as necessary the review of systems, past medical/surgical history, family history, social history, and examination findings as documented by others; and I examined the patient myself.  I personally reviewed the relevant tests, images, and reports as documented above.  I personally reviewed the ophthalmic test(s) associated with this encounter, agree with the interpretation(s) as documented by the resident/fellow, and have edited the corresponding report(s) as necessary.   I formulated and edited as necessary the assessment and plan and discussed the findings and management plan with the patient and family    Rebekah Serrato MD, PhD  , Vitreoretinal  Surgery  Department of Ophthalmology  [facility]

## 2023-12-12 ENCOUNTER — TELEPHONE (OUTPATIENT)
Dept: FAMILY MEDICINE | Facility: CLINIC | Age: 63
End: 2023-12-12
Payer: COMMERCIAL

## 2023-12-12 DIAGNOSIS — F11.20 OPIOID USE DISORDER, SEVERE, ON MAINTENANCE THERAPY (H): ICD-10-CM

## 2023-12-12 RX ORDER — BUPRENORPHINE AND NALOXONE 8; 2 MG/1; MG/1
FILM, SOLUBLE BUCCAL; SUBLINGUAL
Qty: 90 FILM | Refills: 1 | Status: SHIPPED | OUTPATIENT
Start: 2023-12-12 | End: 2024-01-11

## 2023-12-12 NOTE — TELEPHONE ENCOUNTER
Patient calling in a refill Request Liliana say they have been Requesting from Us?  Please send to    [id number] - [place], [place] - [address] AT [facility] [address]

## 2023-12-12 NOTE — TELEPHONE ENCOUNTER
Patient informed and will Talk with YAMEL Frost [facility] Unit Coordinator

## 2023-12-28 ENCOUNTER — OFFICE VISIT (OUTPATIENT)
Dept: DERMATOLOGY | Facility: CLINIC | Age: 63
End: 2023-12-28
Payer: COMMERCIAL

## 2023-12-28 DIAGNOSIS — D18.01 CHERRY ANGIOMA: ICD-10-CM

## 2023-12-28 DIAGNOSIS — L82.1 SEBORRHEIC KERATOSES: ICD-10-CM

## 2023-12-28 DIAGNOSIS — L81.4 LENTIGINES: ICD-10-CM

## 2023-12-28 DIAGNOSIS — D22.9 MULTIPLE BENIGN NEVI: Primary | ICD-10-CM

## 2023-12-28 DIAGNOSIS — Z85.820 HX OF MALIGNANT MELANOMA: ICD-10-CM

## 2023-12-28 DIAGNOSIS — Z12.83 ENCOUNTER FOR SCREENING FOR MALIGNANT NEOPLASM OF SKIN: ICD-10-CM

## 2023-12-28 DIAGNOSIS — D48.9 NEOPLASM OF UNCERTAIN BEHAVIOR: ICD-10-CM

## 2023-12-28 PROCEDURE — 99203 OFFICE O/P NEW LOW 30 MIN: CPT | Mod: 25 | Performed by: NURSE PRACTITIONER

## 2023-12-28 PROCEDURE — 88305 TISSUE EXAM BY PATHOLOGIST: CPT | Performed by: PATHOLOGY

## 2023-12-28 PROCEDURE — 88342 IMHCHEM/IMCYTCHM 1ST ANTB: CPT | Performed by: PATHOLOGY

## 2023-12-28 PROCEDURE — 11102 TANGNTL BX SKIN SINGLE LES: CPT | Performed by: NURSE PRACTITIONER

## 2023-12-28 NOTE — LETTER
12/28/2023         RE: Henrique Nathan  [address] [id number]        Dear Colleague,    Thank you for referring your patient, Henrique Nathan, to the [facility]. Please see a copy of my visit note below.    [facility] Dermatology Note  Encounter Date: Dec 28, 2023  Office Visit     Reviewed patients past medical history and pertinent chart review prior to patients visit today.     Dermatology Problem List:  0. NUB left clavicle, shave biopsy 12/28/23 .   History taken from 2019 visit from [place] nicollet, Kia Lilly, MD  Right mid back, s/p wide local excision and sentinel lymph node biopsy   (negative), 01/2013. Breslow's depth was 1.15 mm stage IB  2. Onychomadesis of toenail    3. History of dysplastic nevus    - right posterior shoulder, excised 01/2013   - left upper abdomen, moderately dysplastic, 2013   - left upper back, moderate to severely dysplastic, 2014    ____________________________________________    Assessment & Plan:     # history of melanoma and dysplastic nevi. Well healed scar without signs of recurrent malignancy or pigmentation.     # Neoplasm of uncertain behavior:  left clavicle  DDx includes dysplastic nevus vs melanoma. Shave biopsy today.    Procedure Note: Biopsy by shave technique  The risks and benefits of the procedure were described to the patient. These include but are not limited to bleeding, infection, scar, incomplete removal, and non-diagnostic biopsy. Verbal informed consent was obtained. The above site(s) was cleansed with an alcohol pad and injected with 1% lidocaine with epinephrine. Once anesthesia was obtained, a biopsy(ies) was performed with Gilette blade. The tissue(s) was placed in a labeled container(s) with formalin and sent to pathology. Hemostasis was achieved with aluminum chloride. Vaseline and a bandage were applied to the wound(s). The patient tolerated the procedure well and was given post  biopsy care instructions.     # Benign skin findings including: seborrheic keratoses, cherry angioma, lentigines and benign nevi.   - No further intervention required. Patient to report changes.   - Patient reassured of the benign nature of these lesions.    #Signs and Symptoms of non-melanoma skin cancer and ABCDEs of melanoma reviewed with patient. Patient encouraged to perform monthly self skin exams and educated on how to perform them. UV precautions reviewed with patient. Patient was asked about new or changing moles/lesions on body.     #Reviewed Sunscreen: Apply 20 minutes prior to going outdoors and reapply every two hours, when wet or sweating. We recommend using an SPF 30 or higher, and to use one that is water resistant.       Follow-up:  1 years for follow up full body skin exam, prn for new or changing lesions or new concerns    Mayela Lemos, LILIANA  Dermatology     ____________________________________________    CC: Skin Check (Full body: no moles of concern )    HPI:  Mr. Henirque Nathan is a(n) 63 year old male who presents today as a new patient for a full body skin cancer screening. Patient has no specific concerns today. He has a history of malanoma and DN and hasn't had a skin exam in 4 years.      Patient denies any fever, weight loss, swollen lymph nodes, loss of appetite or energy, and has been feeling well.      Physical Exam:  Vitals: There were no vitals taken for this visit.  SKIN: Total skin excluding the genitalia areas was performed. The exam included the head/face, neck, both arms, chest, back, abdomen, both legs, digits, mons pubis, buttock and nails.   -left clavicle, 8 mm dark brown irregular shaped macule  -well healed scar without repigmentation on right mid back  -no axillary, cervical, supraclavicular, preauricular, or epitrochlear lymphadenopathy.    -several 1-2mm red dome shaped symmetric papules scattered on the trunk  -multiple tan/brown flat round macules and raised papules  scattered throughout trunk, extremities and head. No worrisome features for malignancy noted on examination.  -scattered tan, homogenous macules scattered on sun exposed areas of trunk, extremities and face.   -scattered waxy, stuck on tan/brown papules and patches on the trunk     - No other lesions of concern on areas examined.     Medications:  Current Outpatient Medications   Medication     buprenorphine HCl-naloxone HCl (SUBOXONE) 8-2 MG per film     DULoxetine (CYMBALTA) 30 MG capsule     lisinopril (ZESTRIL) 20 MG tablet     metFORMIN (GLUCOPHAGE) 500 MG tablet     atorvastatin (LIPITOR) 10 MG tablet     blood glucose (NO BRAND SPECIFIED) test strip     blood glucose monitoring (NO BRAND SPECIFIED) meter device kit     thin (NO BRAND SPECIFIED) lancets     No current facility-administered medications for this visit.      Past Medical History:   Patient Active Problem List   Diagnosis     History of substance abuse (H)     Type 2 diabetes mellitus with diabetic nephropathy, without long-term current use of insulin (H)     Obstructive sleep apnea syndrome     Morbid obesity (H)     Mild opioid use disorder (H)     Hx of malignant melanoma     Past Medical History:   Diagnosis Date     Malignant melanoma (H)        CC Cory Bond PA-C  [address]  [place] [address] on close of this encounter.      Again, thank you for allowing me to participate in the care of your patient.        Sincerely,        TA Cassidy CNP

## 2023-12-28 NOTE — PROGRESS NOTES
[facility] Dermatology Note  Encounter Date: Dec 28, 2023  Office Visit     Reviewed patients past medical history and pertinent chart review prior to patients visit today.     Dermatology Problem List:  0. NUB left clavicle, shave biopsy 12/28/23 .   History taken from 2019 visit from [place] nicollet, Kia Lilly, MD  Right mid back, s/p wide local excision and sentinel lymph node biopsy   (negative), 01/2013. Breslow's depth was 1.15 mm stage IB  2. Onychomadesis of toenail    3. History of dysplastic nevus    - right posterior shoulder, excised 01/2013   - left upper abdomen, moderately dysplastic, 2013   - left upper back, moderate to severely dysplastic, 2014    ____________________________________________    Assessment & Plan:     # history of melanoma and dysplastic nevi. Well healed scar without signs of recurrent malignancy or pigmentation.     # Neoplasm of uncertain behavior:  left clavicle  DDx includes dysplastic nevus vs melanoma. Shave biopsy today.    Procedure Note: Biopsy by shave technique  The risks and benefits of the procedure were described to the patient. These include but are not limited to bleeding, infection, scar, incomplete removal, and non-diagnostic biopsy. Verbal informed consent was obtained. The above site(s) was cleansed with an alcohol pad and injected with 1% lidocaine with epinephrine. Once anesthesia was obtained, a biopsy(ies) was performed with Gilette blade. The tissue(s) was placed in a labeled container(s) with formalin and sent to pathology. Hemostasis was achieved with aluminum chloride. Vaseline and a bandage were applied to the wound(s). The patient tolerated the procedure well and was given post biopsy care instructions.     # Benign skin findings including: seborrheic keratoses, cherry angioma, lentigines and benign nevi.   - No further intervention required. Patient to report changes.   - Patient reassured of the benign nature of these  lesions.    #Signs and Symptoms of non-melanoma skin cancer and ABCDEs of melanoma reviewed with patient. Patient encouraged to perform monthly self skin exams and educated on how to perform them. UV precautions reviewed with patient. Patient was asked about new or changing moles/lesions on body.     #Reviewed Sunscreen: Apply 20 minutes prior to going outdoors and reapply every two hours, when wet or sweating. We recommend using an SPF 30 or higher, and to use one that is water resistant.       Follow-up:  1 years for follow up full body skin exam, prn for new or changing lesions or new concerns    Mayela Lemos CNP  Dermatology     ____________________________________________    CC: Skin Check (Full body: no moles of concern )    HPI:  Mr. Henrique Nathan is a(n) 63 year old male who presents today as a new patient for a full body skin cancer screening. Patient has no specific concerns today. He has a history of malanoma and DN and hasn't had a skin exam in 4 years.      Patient denies any fever, weight loss, swollen lymph nodes, loss of appetite or energy, and has been feeling well.      Physical Exam:  Vitals: There were no vitals taken for this visit.  SKIN: Total skin excluding the genitalia areas was performed. The exam included the head/face, neck, both arms, chest, back, abdomen, both legs, digits, mons pubis, buttock and nails.   -left clavicle, 8 mm dark brown irregular shaped macule  -well healed scar without repigmentation on right mid back  -no axillary, cervical, supraclavicular, preauricular, or epitrochlear lymphadenopathy.    -several 1-2mm red dome shaped symmetric papules scattered on the trunk  -multiple tan/brown flat round macules and raised papules scattered throughout trunk, extremities and head. No worrisome features for malignancy noted on examination.  -scattered tan, homogenous macules scattered on sun exposed areas of trunk, extremities and face.   -scattered waxy, stuck on tan/brown  papules and patches on the trunk     - No other lesions of concern on areas examined.     Medications:  Current Outpatient Medications   Medication    buprenorphine HCl-naloxone HCl (SUBOXONE) 8-2 MG per film    DULoxetine (CYMBALTA) 30 MG capsule    lisinopril (ZESTRIL) 20 MG tablet    metFORMIN (GLUCOPHAGE) 500 MG tablet    atorvastatin (LIPITOR) 10 MG tablet    blood glucose (NO BRAND SPECIFIED) test strip    blood glucose monitoring (NO BRAND SPECIFIED) meter device kit    thin (NO BRAND SPECIFIED) lancets     No current facility-administered medications for this visit.      Past Medical History:   Patient Active Problem List   Diagnosis    History of substance abuse (H)    Type 2 diabetes mellitus with diabetic nephropathy, without long-term current use of insulin (H)    Obstructive sleep apnea syndrome    Morbid obesity (H)    Mild opioid use disorder (H)    Hx of malignant melanoma     Past Medical History:   Diagnosis Date    Malignant melanoma (H)        CC Cory Bond PA-C  [address]  [place] [address] on close of this encounter.

## 2023-12-28 NOTE — PATIENT INSTRUCTIONS
Wound Care After a Biopsy    What is a skin biopsy?  A skin biopsy allows the doctor to examine a very small piece of tissue under the microscope to determine the diagnosis and the best treatment for the skin condition. A local anesthetic (numbing medicine)  is injected with a very small needle into the skin area to be tested. A small piece of skin is taken from the area. Sometimes a suture (stitch) is used.     What are the risks of a skin biopsy?  I will experience scar, bleeding, swelling, pain, crusting and redness. I may experience incomplete removal or recurrence. Risks of this procedure are excessive bleeding, bruising, infection, nerve damage, numbness, thick (hypertrophic or keloidal) scar and non-diagnostic biopsy.    How should I care for my wound for the first 24 hours?  Keep the wound dry and covered for 24 hours  If it bleeds, hold direct pressure on the area for 15 minutes. If bleeding does not stop then go to the emergency room  Avoid strenuous exercise the first 1-2 days or as your doctor instructs you    How should I care for the wound after 24 hours?  After 24 hours, remove the bandage  You may bathe or shower as normal  If you had a scalp biopsy, you can shampoo as usual and can use shower water to clean the biopsy site daily  Clean the wound twice a day with gentle soap and water  Do not scrub, be gentle  Apply white petroleum/Vaseline after cleaning the wound with a cotton swab or a clean finger, and keep the site covered with a Bandaid /bandage. Bandages are not necessary with a scalp biopsy  If you are unable to cover the site with a Bandaid /bandage, re-apply ointment 2-3 times a day to keep the site moist. Moisture will help with healing  Avoid strenuous activity for first 1-2 days  Avoid lakes, rivers, pools, and oceans until the stitches are removed or the site is healed    How do I clean my wound?  Wash hands thoroughly with soap or use hand  before all wound care  Clean the  wound with gentle soap and water  Apply white petroleum/Vaseline  to wound after it is clean  Replace the Bandaid /bandage to keep the wound covered for the first few days or as instructed by your doctor  If you had a scalp biopsy, warm shower water to the area on a daily basis should suffice    What should I use to clean my wound?   Cotton-tipped applicators (Qtips )  White petroleum jelly (Vaseline ). Use a clean new container and use Q-tips to apply.  Bandaids   as needed  Gentle soap     How should I care for my wound long term?  Do not get your wound dirty  Keep up with wound care for one week or until the area is healed.  A small scab will form and fall off by itself when the area is completely healed. The area will be red and will become pink in color as it heals. Sun protection is very important for how your scar will turn out. Sunscreen with an SPF 30 or greater is recommended once the area is healed.  You should have some soreness but it should be mild and slowly go away over several days. Talk to your doctor about using tylenol for pain,    When should I call my doctor?  If you have increased:   Pain or swelling  Pus or drainage (clear or slightly yellow drainage is ok)  Temperature over 100F  Spreading redness or warmth around wound    When will I hear about my results?  The biopsy results can take 2 weeks to come back.  Your results will automatically release to [employer] before your provider has even reviewed them.  The clinic will call you with the results, send you a [employer] message, or have you schedule a follow-up clinic or phone time to discuss the results.  Contact our clinics if you do not hear from us in 2 weeks.    If you have any questions please send us a [employer] message or call us at [phone]    Thank you,  [facility]

## 2024-01-03 ENCOUNTER — TELEPHONE (OUTPATIENT)
Dept: DERMATOLOGY | Facility: CLINIC | Age: 64
End: 2024-01-03
Payer: COMMERCIAL

## 2024-01-03 DIAGNOSIS — D48.9 NEOPLASM OF UNCERTAIN BEHAVIOR: Primary | ICD-10-CM

## 2024-01-03 LAB
PATH REPORT.COMMENTS IMP SPEC: ABNORMAL
PATH REPORT.COMMENTS IMP SPEC: YES
PATH REPORT.FINAL DX SPEC: ABNORMAL
PATH REPORT.GROSS SPEC: ABNORMAL
PATH REPORT.MICROSCOPIC SPEC OTHER STN: ABNORMAL
PATH REPORT.RELEVANT HX SPEC: ABNORMAL

## 2024-01-03 NOTE — LETTER
[address]  [id number] [phone]        1/3/2024       Henrique Nathan  [address] [place] [id number]      Dear Henrique:    You are scheduled for Mohs Surgery on: 1/25/24 at 8:00 am.    Please check in at [facility], [address].     You don't need to arrive more than 5-10 minutes prior to your appointment time.     Be sure to eat a good breakfast and bathe and wash your hair prior to surgery.     If you are taking any anti-coagulants that are prescribed by your Doctor (such as Coumadin/Warfarin, Plavix, Aspirin, Ibuprofen), please continue taking them.     However, if you are taking anti-coagulants over the counter without a Doctor's order for a medical condition, please discontinue them 10 days prior to surgery.     Please wear loose comfortable clothing as it could possibly be 4-6 hours until your surgery is completed depending upon how many layers of tissue need to be removed.      Thank you,    ALCON Pacheco MD

## 2024-01-03 NOTE — TELEPHONE ENCOUNTER
Called patient:  Patient notified and educated on test results   Mohs procedure explained- all questions answered  appointment scheduled- mohs packet mailed.     Thank you,  Flavia GONZALEZ RN  Dermatology   [phone]

## 2024-01-03 NOTE — TELEPHONE ENCOUNTER
----- Message from TA Coleman CNP sent at 1/3/2024  1:17 PM CST -----  Results given to patient.  Please schedule a 6-month skin check with me and a melanoma excision for the clavicle

## 2024-01-10 DIAGNOSIS — F11.20 OPIOID USE DISORDER, SEVERE, ON MAINTENANCE THERAPY (H): ICD-10-CM

## 2024-01-11 RX ORDER — BUPRENORPHINE AND NALOXONE 8; 2 MG/1; MG/1
FILM, SOLUBLE BUCCAL; SUBLINGUAL
Qty: 270 FILM | Refills: 0 | Status: SHIPPED | OUTPATIENT
Start: 2024-01-11 | End: 2024-02-16

## 2024-01-25 ENCOUNTER — OFFICE VISIT (OUTPATIENT)
Dept: DERMATOLOGY | Facility: CLINIC | Age: 64
End: 2024-01-25
Payer: COMMERCIAL

## 2024-01-25 DIAGNOSIS — Z85.820 HX OF MALIGNANT MELANOMA: ICD-10-CM

## 2024-01-25 DIAGNOSIS — L82.1 SEBORRHEIC KERATOSES: ICD-10-CM

## 2024-01-25 DIAGNOSIS — D23.9 DERMAL NEVUS: Primary | ICD-10-CM

## 2024-01-25 DIAGNOSIS — D18.01 ANGIOMA OF SKIN: ICD-10-CM

## 2024-01-25 DIAGNOSIS — D03.59 MELANOMA IN SITU OF TRUNK (H): ICD-10-CM

## 2024-01-25 DIAGNOSIS — L81.4 LENTIGO: ICD-10-CM

## 2024-01-25 PROCEDURE — 17313 MOHS 1 STAGE T/A/L: CPT | Performed by: DERMATOLOGY

## 2024-01-25 PROCEDURE — 13101 CMPLX RPR TRUNK 2.6-7.5 CM: CPT | Performed by: DERMATOLOGY

## 2024-01-25 PROCEDURE — 99213 OFFICE O/P EST LOW 20 MIN: CPT | Mod: 25 | Performed by: DERMATOLOGY

## 2024-01-25 ASSESSMENT — PAIN SCALES - GENERAL: PAINLEVEL: NO PAIN (0)

## 2024-01-25 NOTE — PROGRESS NOTES
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  Patient has no other skin complaints today.  Remainder of the HPI, Meds, PMH, Allergies, FH, and SH was reviewed in chart.      Past Medical History:   Diagnosis Date    Malignant melanoma (H)        No past surgical history on file.     History reviewed. No pertinent family history.    Social History     Socioeconomic History    Marital status:      Spouse name: Not on file    Number of children: Not on file    Years of education: Not on file    Highest education level: Not on file   Occupational History    Not on file   Tobacco Use    Smoking status: Former     Types: Cigarettes     Quit date: 10/1/2021     Years since quittin.3     Passive exposure: Past    Smokeless tobacco: Never   Vaping Use    Vaping Use: Never used   Substance and Sexual Activity    Alcohol use: Not on file    Drug use: Not on file    Sexual activity: Not on file   Other Topics Concern    Not on file   Social History Narrative    Not on file     Social Determinants of Health     Financial Resource Strain: Not on file   Food Insecurity: Not on file   Transportation Needs: Not on file   Physical Activity: Not on file   Stress: Not on file   Social Connections: Not on file   Interpersonal Safety: Not on file   Housing Stability: Not on file       Outpatient Encounter Medications as of 2024   Medication Sig Dispense Refill    atorvastatin (LIPITOR) 10 MG tablet Take 1 tablet (10 mg) by mouth daily 90 tablet 1    buprenorphine HCl-naloxone HCl (SUBOXONE) 8-2 MG per film PLACE 1 FILM UNDER THE TONGUE THREE TIMES DAILY 270 Film 0    DULoxetine (CYMBALTA) 30 MG capsule Take 1 capsule (30 mg) by mouth daily 90 capsule 1    lisinopril (ZESTRIL) 20 MG tablet Take 1 tablet (20 mg) by mouth daily 90 tablet 1    metFORMIN (GLUCOPHAGE) 500 MG tablet Take 500 mg by mouth 2 times daily (with meals)      blood glucose  (NO BRAND SPECIFIED) test strip Use to test blood sugar 1 times daily or as directed. To accompany: Blood Glucose Monitor Brands: per insurance. 100 strip 6    blood glucose monitoring (NO BRAND SPECIFIED) meter device kit Use to test blood sugar 1 times daily or as directed. Preferred blood glucose meter OR supplies to accompany: Blood Glucose Monitor Brands: per insurance. 1 kit 0    thin (NO BRAND SPECIFIED) lancets Use with lanceting device. To accompany: Blood Glucose Monitor Brands: per insurance. 100 each 6     No facility-administered encounter medications on file as of 1/25/2024.             Review Of Systems  Skin: As above  Eyes: negative  Ears/Nose/Throat: negative  Respiratory: No shortness of breath, dyspnea on exertion, cough, or hemoptysis  Cardiovascular: negative  Gastrointestinal: negative  Genitourinary: negative  Musculoskeletal: negative  Neurologic: negative  Psychiatric: negative  Hematologic/Lymphatic/Immunologic: negative  Endocrine: negative      O:   NAD, WDWN, Alert & Oriented, Mood & Affect wnl, Vitals stable   Here today with wife    General appearance joshua ii   Vitals stable   Alert, oriented and in no acute distress   L clavicle 6mm red scaly papule    Stuck on papules and brown macules on trunk and ext    Red papules on trunk   Flesh colored papules on trunk          Eyes: Conjunctivae/lids:Normal     ENT: Lips, buccal mucosa, tongue: normal    MSK:Normal    Cardiovascular: peripheral edema none    Pulm: Breathing Normal    Lymph Nodes: No Head and Neck Lymphadenopathy or clavicular     Neuro/Psych: Orientation:Normal; Mood/Affect:Normal      A/P:  1. Seborrheic keratosis, lentigo, angioma, dermal nevus, hx of melanoma   2. Melanoma in situ left clavicle   It was a pleasure speaking to Henrique Nathan today.  Previous clinic  notes and pertinent laboratory tests were reviewed prior to Henrique Nathan's visit.  Signs and Symptoms of skin cancer discussed with patient.  Patient  encouraged to perform monthly skin exams.  UV precautions reviewed with patient.  Return to clinic 6 months  PROCEDURE NOTE  L clavicle melanoma in situ   MELANOMA DISCUSSED WITH PATIENT:  I discussed the specifics of tumor, prognosis, metachronous melanoma, self exam, and genetics with the patient. I explained the need for monthly skin exams including and taught the patient how to do this. Patient was asked about new or changing moles . I discussed with patient signs and symptoms that could arise in the setting of recurrent locoregional or metastatic disease. In addition, the need to undergo every 6 month dermatologic full skin survey and evaluation given that patients with a diagnosis of melanoma are at risk of recurrence (local and distant) and of subsequent de lon melanoma.  . I reviewed treatment options, including a discussion of wide excision (the gold standard) versus Mohs surgery with MART-1 immunostains.     Note: MART-1 (Melanoma Antigen Recognized by T-cells) antibody immunostaining was used during Mohs surgery as per standard protocol, in addition to routine processing of all specimens with hematoxylin and eosin. The peripheral margins/edges were processed with the MART-1 stain (2 specimens total). The center was examined with hematoxylin & eosin and MART-1 immunostains. The patient was informed of the procedure and its risk/benefits during the consent for the procedure.    One or more of the reagents used in immunohistochemical testing in this case may not have been cleared or approved by the U.S. Food and Drug Administration (FDA). The FDA has determined that such clearance or approval is not necessary. These tests are used for clinical purposes. They should not be regarded as investigational or for research. These reagents  performance characteristics have been determined by [facility]. This laboratory is certified under the Clinical Laboratory Improvement Amendments of 1988  (CLIA-88) as qualified to perform high complexity clinical laboratory testing.      MOHS:   Aggressive histology    The rationale for Mohs surgery was discussed with the patient and consent was obtained.  The risks and benefits as well as alternatives to therapy were discussed, in detail.  Specifically, the risks of infection, scarring, bleeding, prolonged wound healing, incomplete removal, allergy to anesthesia, nerve injury and recurrence were addressed.  Indication for Mohs was Aggressive histology. Prior to the procedure, the treatment site was clearly identified and, if available, confirmed with previous photos and confirmed by the patient   All components of the Universal Protocol/PAUSE rule were completed.  The Mohs surgeon operated in two distinct and integrated capacities as the surgeon and pathologist.      The area was prepped with Betasept.  A rim of normal appearing skin was marked circumferentially around the lesion.  The area was infiltrated with local anesthesia.  The tumor was first debulked to remove all clinically apparent tumor.  An incision following the standard Mohs approach was done and the specimen was oriented,mapped and placed in 3 block(s).  Each specimen was then chromacoded and processed in the Mohs laboratory using standard Mohs technique and submitted for frozen section histology.  Frozen section analysis showed no residual tumor but CLEAR MARGINS.      The tumor was excised using standard Mohs technique in 1 stages(s).  MART 1 stains were performed on 2 specimens. CLEAR MARGINS OBTAINED and Final defect size was 1.6 x 1.8 cm.     We discussed the options for wound management in full with the patient including risks/benefits/ possible outcomes.      REPAIR COMPLEX: Because of the tightness of the surrounding skin and Because of the size and full thickness nature of the defect, Because of the tightness of the surrounding skin, To maintain form and function, and In order to avoid  distortion, a complex closure was planned. After LE anesthesia and prep, Burow's triangles were excised in the relaxed skin tension lines. The wound edges were widely undermined greater than width of the defect on both sides by dissection in the subcutaneous plane until adequate tissue mobility was obtained. Hemostasis was obtained. The wound edges were closed in a layered fashion using Vicryl and Fast Absorbing Plain Gut sutures. Postoperative length was 4.5 cm.   EBL minimal; complications none; wound care routine.  The patient was discharged in good condition and will return in one week for wound evaluation.

## 2024-01-25 NOTE — LETTER
2024         RE: Henrique Nathan  [address] [facility] [id number]        Dear Colleague,    Thank you for referring your patient, Henrique Nathan, to the [facility]. Please see a copy of my visit note below.    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  Patient has no other skin complaints today.  Remainder of the HPI, Meds, PMH, Allergies, FH, and SH was reviewed in chart.      Past Medical History:   Diagnosis Date     Malignant melanoma (H)        No past surgical history on file.     History reviewed. No pertinent family history.    Social History     Socioeconomic History     Marital status:      Spouse name: Not on file     Number of children: Not on file     Years of education: Not on file     Highest education level: Not on file   Occupational History     Not on file   Tobacco Use     Smoking status: Former     Types: Cigarettes     Quit date: 10/1/2021     Years since quittin.3     Passive exposure: Past     Smokeless tobacco: Never   Vaping Use     Vaping Use: Never used   Substance and Sexual Activity     Alcohol use: Not on file     Drug use: Not on file     Sexual activity: Not on file   Other Topics Concern     Not on file   Social History Narrative     Not on file     Social Determinants of Health     Financial Resource Strain: Not on file   Food Insecurity: Not on file   Transportation Needs: Not on file   Physical Activity: Not on file   Stress: Not on file   Social Connections: Not on file   Interpersonal Safety: Not on file   Housing Stability: Not on file       Outpatient Encounter Medications as of 2024   Medication Sig Dispense Refill     atorvastatin (LIPITOR) 10 MG tablet Take 1 tablet (10 mg) by mouth daily 90 tablet 1     buprenorphine  HCl-naloxone HCl (SUBOXONE) 8-2 MG per film PLACE 1 FILM UNDER THE TONGUE THREE TIMES DAILY 270 Film 0     DULoxetine (CYMBALTA) 30 MG capsule Take 1 capsule (30 mg) by mouth daily 90 capsule 1     lisinopril (ZESTRIL) 20 MG tablet Take 1 tablet (20 mg) by mouth daily 90 tablet 1     metFORMIN (GLUCOPHAGE) 500 MG tablet Take 500 mg by mouth 2 times daily (with meals)       blood glucose (NO BRAND SPECIFIED) test strip Use to test blood sugar 1 times daily or as directed. To accompany: Blood Glucose Monitor Brands: per insurance. 100 strip 6     blood glucose monitoring (NO BRAND SPECIFIED) meter device kit Use to test blood sugar 1 times daily or as directed. Preferred blood glucose meter OR supplies to accompany: Blood Glucose Monitor Brands: per insurance. 1 kit 0     thin (NO BRAND SPECIFIED) lancets Use with lanceting device. To accompany: Blood Glucose Monitor Brands: per insurance. 100 each 6     No facility-administered encounter medications on file as of 1/25/2024.             Review Of Systems  Skin: As above  Eyes: negative  Ears/Nose/Throat: negative  Respiratory: No shortness of breath, dyspnea on exertion, cough, or hemoptysis  Cardiovascular: negative  Gastrointestinal: negative  Genitourinary: negative  Musculoskeletal: negative  Neurologic: negative  Psychiatric: negative  Hematologic/Lymphatic/Immunologic: negative  Endocrine: negative      O:   NAD, WDWN, Alert & Oriented, Mood & Affect wnl, Vitals stable   Here today with wife    General appearance joshua ii   Vitals stable   Alert, oriented and in no acute distress   L clavicle 6mm red scaly papule    Stuck on papules and brown macules on trunk and ext    Red papules on trunk   Flesh colored papules on trunk          Eyes: Conjunctivae/lids:Normal     ENT: Lips, buccal mucosa, tongue: normal    MSK:Normal    Cardiovascular: peripheral edema none    Pulm: Breathing Normal    Lymph Nodes: No Head and Neck Lymphadenopathy or clavicular      Neuro/Psych: Orientation:Normal; Mood/Affect:Normal      A/P:  1. Seborrheic keratosis, lentigo, angioma, dermal nevus, hx of melanoma   2. Melanoma in situ left clavicle   It was a pleasure speaking to Henrique Nathan today.  Previous clinic  notes and pertinent laboratory tests were reviewed prior to Henrique Nathan's visit.  Signs and Symptoms of skin cancer discussed with patient.  Patient encouraged to perform monthly skin exams.  UV precautions reviewed with patient.  Return to clinic 6 months  PROCEDURE NOTE  L clavicle melanoma in situ   MELANOMA DISCUSSED WITH PATIENT:  I discussed the specifics of tumor, prognosis, metachronous melanoma, self exam, and genetics with the patient. I explained the need for monthly skin exams including and taught the patient how to do this. Patient was asked about new or changing moles . I discussed with patient signs and symptoms that could arise in the setting of recurrent locoregional or metastatic disease. In addition, the need to undergo every 6 month dermatologic full skin survey and evaluation given that patients with a diagnosis of melanoma are at risk of recurrence (local and distant) and of subsequent de lon melanoma.  . I reviewed treatment options, including a discussion of wide excision (the gold standard) versus Mohs surgery with MART-1 immunostains.     Note: MART-1 (Melanoma Antigen Recognized by T-cells) antibody immunostaining was used during Mohs surgery as per standard protocol, in addition to routine processing of all specimens with hematoxylin and eosin. The peripheral margins/edges were processed with the MART-1 stain (2 specimens total). The center was examined with hematoxylin & eosin and MART-1 immunostains. The patient was informed of the procedure and its risk/benefits during the consent for the procedure.    One or more of the reagents used in immunohistochemical testing in this case may not have been cleared or approved by the U.S. Food and  Drug Administration (FDA). The FDA has determined that such clearance or approval is not necessary. These tests are used for clinical purposes. They should not be regarded as investigational or for research. These reagents  performance characteristics have been determined by [facility]. This laboratory is certified under the Clinical Laboratory Improvement Amendments of 1988 (CLIA-88) as qualified to perform high complexity clinical laboratory testing.      MOHS:   Aggressive histology    The rationale for Mohs surgery was discussed with the patient and consent was obtained.  The risks and benefits as well as alternatives to therapy were discussed, in detail.  Specifically, the risks of infection, scarring, bleeding, prolonged wound healing, incomplete removal, allergy to anesthesia, nerve injury and recurrence were addressed.  Indication for Mohs was Aggressive histology. Prior to the procedure, the treatment site was clearly identified and, if available, confirmed with previous photos and confirmed by the patient   All components of the Universal Protocol/PAUSE rule were completed.  The Mohs surgeon operated in two distinct and integrated capacities as the surgeon and pathologist.      The area was prepped with Betasept.  A rim of normal appearing skin was marked circumferentially around the lesion.  The area was infiltrated with local anesthesia.  The tumor was first debulked to remove all clinically apparent tumor.  An incision following the standard Mohs approach was done and the specimen was oriented,mapped and placed in 3 block(s).  Each specimen was then chromacoded and processed in the Mohs laboratory using standard Mohs technique and submitted for frozen section histology.  Frozen section analysis showed no residual tumor but CLEAR MARGINS.      The tumor was excised using standard Mohs technique in 1 stages(s).  MART 1 stains were performed on 2 specimens. CLEAR MARGINS OBTAINED and Final  defect size was 1.6 x 1.8 cm.     We discussed the options for wound management in full with the patient including risks/benefits/ possible outcomes.      REPAIR COMPLEX: Because of the tightness of the surrounding skin and Because of the size and full thickness nature of the defect, Because of the tightness of the surrounding skin, To maintain form and function, and In order to avoid distortion, a complex closure was planned. After LE anesthesia and prep, Burow's triangles were excised in the relaxed skin tension lines. The wound edges were widely undermined greater than width of the defect on both sides by dissection in the subcutaneous plane until adequate tissue mobility was obtained. Hemostasis was obtained. The wound edges were closed in a layered fashion using Vicryl and Fast Absorbing Plain Gut sutures. Postoperative length was 4.5 cm.   EBL minimal; complications none; wound care routine.  The patient was discharged in good condition and will return in one week for wound evaluation.        Again, thank you for allowing me to participate in the care of your patient.        Sincerely,        Van Pacheco MD

## 2024-01-25 NOTE — PATIENT INSTRUCTIONS
Sutured Wound Care     [facility]: [phone]    [facility]: [phone]        No strenuous activity for 48 hours. Resume moderate activity in 48 hours. No heavy exercising until you are seen for follow up in one week.     Take Tylenol as needed for discomfort.                         Do not drink alcoholic beverages for 48 hours.     Keep the pressure bandage in place for 24 hours. If the bandage becomes blood tinged or loose, reinforce it with gauze and tape.        (Refer to the reverse side of this page for management of bleeding).    Remove pressure bandage in 24 hours     Leave the flat bandage in place until your follow up appointment.    Keep the bandage dry. Wash around it carefully.    If the tape becomes soiled or starts to come off, reinforce it with additional paper tape.    Do not smoke for 3 weeks; smoking is detrimental to wound healing.    It is normal to have swelling and bruising around the surgical site. The bruising will fade in approximately 10-14 days. Elevate the area to reduce swelling.    Numbness, itchiness and sensitivity to temperature changes can occur after surgery and may take up to 18 months to normalize.                    POSSIBLE COMPLICATIONS    BLEEDING:    Leave the bandage in place.Use tightly rolled up gauze or a cloth to apply direct pressure over the bandage for 20 minutes.  Reapply pressure for an additional 20 minutes if necessary  Call the office or go to the nearest emergency room if pressure fails to stop the bleeding.  Use additional gauze and tape to maintain pressure once the bleeding has stopped.        PAIN:    Post operative pain should slowly get better, never worse.  A severe increase in pain may indicate a problem. Call the office if this occurs.    In case of emergency phone:Dr Pacheco [phone]

## 2024-02-02 ENCOUNTER — ALLIED HEALTH/NURSE VISIT (OUTPATIENT)
Dept: DERMATOLOGY | Facility: CLINIC | Age: 64
End: 2024-02-02
Payer: COMMERCIAL

## 2024-02-02 DIAGNOSIS — Z48.01 DRESSING CHANGE OR REMOVAL, SURGICAL WOUND: Primary | ICD-10-CM

## 2024-02-02 PROCEDURE — 99207 PR NO CHARGE NURSE ONLY: CPT

## 2024-02-02 NOTE — PATIENT INSTRUCTIONS
WOUND CARE INSTRUCTIONS  for  ONE WEEK AFTER SURGERY          Leave flat bandage on your skin for one week after today s bandage change.  In one week when you remove the bandage, you may resume your regular skin care routine, including washing with mild soap and water, applying moisturizer, make-up and sunscreen.    If there are any open or bleeding areas at the incision/graft site you should begin to cover the area with a bandage daily as follows:    Clean and dry the area with plain tap water using a Q-tip or sterile gauze pad.  Apply Polysporin or Bacitracin ointment to the open area.  Cover the wound with a band-aid or a sterile non-stick gauze pad and micropore paper tape.         SIGNS OF INFECTION  - If you notice any of these signs of infection, call your doctor right away: expanding redness around the wound.  - Yellow or greenish-colored pus or cloudy wound drainage.    - Red streaking spreading from the wound.  - Increased swelling, tenderness, or pain around the wound.   - Fever.    Please remember that yellow and clear drainage from a wound can be normal and related to normal wound healing.  Isolated drainage from a wound without a combination of the above features does not indicate infection.       *Once the bandages are removed, the scar will be red and firm (especially in the lip/chin area). This is normal and will fade in time. It might take 6-12 months for this to happen.     *Massaging the area will help the scar soften and fade quicker. Begin to massage the area one month after the bandages have been removed. To massage apply pressure directly and firmly over the scar with the fingertips and move in a circular motion. Massage the area for a few minutes several times a day. Continue to massage the site for several months.    *Approximately 6-8 weeks after surgery it is not uncommon to see the formation of  tender pimple-like  bump along the scar. This is normal. As the scar continues to mature and  the stitches underneath the skin begin to dissolve, this might occur. Do not pick or squeeze, this will resolve on it s own. Should one break open producing a small amount of drainage, apply Polysporin or Bacitracin ointment a few times a day until the wound is completely healed.    *Numbness in the surgical area is expected. It might take 12-18 months for the feeling to return to normal. During this time sensations of itchiness, tingling and occasional sharp pains might be noted. These feelings are normal and will subside once the nerves have completely healed.         IN CASE OF EMERGENCY: Dr Pacheco [phone]     If you were seen in [place] call: [phone]    If you were seen in [place] call: [phone]

## 2024-02-02 NOTE — PROGRESS NOTES
Henrique CHANG De Los Santosanoop comes into clinic today at the request of Dr. Pacheco Ordering Provider for Wound Check Action taken: bandage changed .    This service provided today was under the supervising provider of the day Leandra Chapman PA-C, who was available if needed.    Please see providers note on 1/25/24 for orders  Patient returned to clinic for post surgery 1 week follow up bandage change. Patient has no complaints, denies pain.  Bandage removed from L shoulder. Area cleansed with wound cleanser. Site is healing with no signs of infection, wound edges approximating well.   Reapplied new steri strips and paper tape.     Advised to watch for signs and symptons of infection; spreading redness, increasing pain, drainage, odor, fever.   Call or report promptly to clinic if any of these symptoms are present.    Wound care post op instructions given and discussed for 14 day bandage removal and continued incision/scar care.    Patient verbalized understanding.     Thank you,  Flavia GONZALEZ RN  Dermatology   [phone]

## 2024-02-16 ENCOUNTER — TELEPHONE (OUTPATIENT)
Dept: FAMILY MEDICINE | Facility: CLINIC | Age: 64
End: 2024-02-16
Payer: COMMERCIAL

## 2024-02-16 DIAGNOSIS — F11.20 OPIOID USE DISORDER, SEVERE, ON MAINTENANCE THERAPY (H): ICD-10-CM

## 2024-02-16 RX ORDER — BUPRENORPHINE AND NALOXONE 8; 2 MG/1; MG/1
FILM, SOLUBLE BUCCAL; SUBLINGUAL
Qty: 90 FILM | Refills: 0 | Status: SHIPPED | OUTPATIENT
Start: 2024-02-16 | End: 2024-03-12

## 2024-03-12 ENCOUNTER — VIRTUAL VISIT (OUTPATIENT)
Dept: FAMILY MEDICINE | Facility: CLINIC | Age: 64
End: 2024-03-12
Payer: COMMERCIAL

## 2024-03-12 DIAGNOSIS — F11.20 OPIOID USE DISORDER, SEVERE, ON MAINTENANCE THERAPY (H): ICD-10-CM

## 2024-03-12 DIAGNOSIS — M79.10 MYALGIA: ICD-10-CM

## 2024-03-12 DIAGNOSIS — I10 HYPERTENSION, UNSPECIFIED TYPE: ICD-10-CM

## 2024-03-12 DIAGNOSIS — E11.21 TYPE 2 DIABETES MELLITUS WITH DIABETIC NEPHROPATHY, WITHOUT LONG-TERM CURRENT USE OF INSULIN (H): Primary | ICD-10-CM

## 2024-03-12 PROCEDURE — 99441 PR PHYSICIAN TELEPHONE EVALUATION 5-10 MIN: CPT | Mod: 93 | Performed by: PHYSICIAN ASSISTANT

## 2024-03-12 RX ORDER — BUPRENORPHINE AND NALOXONE 8; 2 MG/1; MG/1
FILM, SOLUBLE BUCCAL; SUBLINGUAL
Qty: 90 FILM | Refills: 0 | Status: SHIPPED | OUTPATIENT
Start: 2024-03-12 | End: 2024-04-18

## 2024-03-12 RX ORDER — LISINOPRIL 20 MG/1
20 TABLET ORAL DAILY
Qty: 90 TABLET | Refills: 0 | Status: SHIPPED | OUTPATIENT
Start: 2024-03-12 | End: 2024-04-18

## 2024-03-12 RX ORDER — ATORVASTATIN CALCIUM 10 MG/1
10 TABLET, FILM COATED ORAL DAILY
Qty: 90 TABLET | Refills: 0 | Status: SHIPPED | OUTPATIENT
Start: 2024-03-12 | End: 2024-07-29

## 2024-03-12 RX ORDER — DULOXETIN HYDROCHLORIDE 30 MG/1
30 CAPSULE, DELAYED RELEASE ORAL DAILY
Qty: 90 CAPSULE | Refills: 0 | Status: SHIPPED | OUTPATIENT
Start: 2024-03-12 | End: 2024-04-18

## 2024-03-12 NOTE — PROGRESS NOTES
"Jerrell is a 63 year old who is being evaluated via a billable telephone visit.      What phone number would you like to be contacted at? [phone]   How would you like to obtain your AVS? Denisa  Originating Location (pt. Location): Home    Distant Location (provider location):  On-site    Assessment & Plan     Type 2 diabetes mellitus with diabetic nephropathy, without long-term current use of insulin (H)  Stable, does not check BS.  Will have him follow up in person next month to update labs.  - metFORMIN (GLUCOPHAGE) 500 MG tablet; Take 1 tablet (500 mg) by mouth 2 times daily (with meals)  - atorvastatin (LIPITOR) 10 MG tablet; Take 1 tablet (10 mg) by mouth daily    Opioid use disorder, severe, on maintenance therapy (H)  Stable, responsible.  Nothing concerning on PDMP  - buprenorphine HCl-naloxone HCl (SUBOXONE) 8-2 MG per film; PLACE 1 FILM UNDER THE TONGUE THREE TIMES DAILY    Myalgia    - DULoxetine (CYMBALTA) 30 MG capsule; Take 1 capsule (30 mg) by mouth daily    Hypertension, unspecified type    - lisinopril (ZESTRIL) 20 MG tablet; Take 1 tablet (20 mg) by mouth daily            BMI  Estimated body mass index is 36.28 kg/m  as calculated from the following:    Height as of 9/7/23: 1.815 m (5' 11.46\").    Weight as of 9/7/23: 119.5 kg (263 lb 8 oz).             Subjective   Jerrell is a 63 year old, presenting for the following health issues:  Recheck Medication        9/7/2023     2:39 PM   Additional Questions   Roomed by Maya DOWNING   Accompanied by n/a     HPI             Review of Systems  Constitutional, HEENT, cardiovascular, pulmonary, gi and gu systems are negative, except as otherwise noted.      Objective           Vitals:  No vitals were obtained today due to virtual visit.    Physical Exam   General: Alert and no distress //Respiratory: No audible wheeze, cough, or shortness of breath // Psychiatric:  Appropriate affect, tone, and pace of words            Phone call duration: 8 minutes  Signed " Electronically by: Cory Bond PA-C

## 2024-04-09 RX ORDER — BUPRENORPHINE AND NALOXONE 8; 2 MG/1; MG/1
FILM, SOLUBLE BUCCAL; SUBLINGUAL
Qty: 270 FILM | OUTPATIENT
Start: 2024-04-09

## 2024-04-18 ENCOUNTER — OFFICE VISIT (OUTPATIENT)
Dept: FAMILY MEDICINE | Facility: CLINIC | Age: 64
End: 2024-04-18
Payer: COMMERCIAL

## 2024-04-18 VITALS
SYSTOLIC BLOOD PRESSURE: 132 MMHG | TEMPERATURE: 97.8 F | OXYGEN SATURATION: 97 % | WEIGHT: 265.5 LBS | RESPIRATION RATE: 16 BRPM | HEART RATE: 72 BPM | BODY MASS INDEX: 35.96 KG/M2 | DIASTOLIC BLOOD PRESSURE: 84 MMHG | HEIGHT: 72 IN

## 2024-04-18 DIAGNOSIS — E66.01 MORBID OBESITY (H): ICD-10-CM

## 2024-04-18 DIAGNOSIS — Z12.5 SCREENING PSA (PROSTATE SPECIFIC ANTIGEN): ICD-10-CM

## 2024-04-18 DIAGNOSIS — M79.10 MYALGIA: ICD-10-CM

## 2024-04-18 DIAGNOSIS — L03.031 ABSCESS OR CELLULITIS OF TOE, RIGHT: ICD-10-CM

## 2024-04-18 DIAGNOSIS — L02.611 ABSCESS OR CELLULITIS OF TOE, RIGHT: ICD-10-CM

## 2024-04-18 DIAGNOSIS — F43.23 ADJUSTMENT DISORDER WITH MIXED ANXIETY AND DEPRESSED MOOD: ICD-10-CM

## 2024-04-18 DIAGNOSIS — F11.20 OPIOID USE DISORDER, SEVERE, ON MAINTENANCE THERAPY (H): ICD-10-CM

## 2024-04-18 DIAGNOSIS — I10 HYPERTENSION, UNSPECIFIED TYPE: ICD-10-CM

## 2024-04-18 DIAGNOSIS — Z00.00 ROUTINE GENERAL MEDICAL EXAMINATION AT A HEALTH CARE FACILITY: Primary | ICD-10-CM

## 2024-04-18 DIAGNOSIS — E11.21 TYPE 2 DIABETES MELLITUS WITH DIABETIC NEPHROPATHY, WITHOUT LONG-TERM CURRENT USE OF INSULIN (H): ICD-10-CM

## 2024-04-18 LAB
BASOPHILS # BLD AUTO: 0 10E3/UL (ref 0–0.2)
BASOPHILS NFR BLD AUTO: 1 %
EOSINOPHIL # BLD AUTO: 0.1 10E3/UL (ref 0–0.7)
EOSINOPHIL NFR BLD AUTO: 2 %
ERYTHROCYTE [DISTWIDTH] IN BLOOD BY AUTOMATED COUNT: 12.9 % (ref 10–15)
HBA1C MFR BLD: 7.2 % (ref 0–5.6)
HCT VFR BLD AUTO: 43.6 % (ref 40–53)
HGB BLD-MCNC: 14.4 G/DL (ref 13.3–17.7)
IMM GRANULOCYTES # BLD: 0 10E3/UL
IMM GRANULOCYTES NFR BLD: 0 %
LYMPHOCYTES # BLD AUTO: 2.1 10E3/UL (ref 0.8–5.3)
LYMPHOCYTES NFR BLD AUTO: 44 %
MCH RBC QN AUTO: 26.6 PG (ref 26.5–33)
MCHC RBC AUTO-ENTMCNC: 33 G/DL (ref 31.5–36.5)
MCV RBC AUTO: 81 FL (ref 78–100)
MONOCYTES # BLD AUTO: 0.4 10E3/UL (ref 0–1.3)
MONOCYTES NFR BLD AUTO: 9 %
NEUTROPHILS # BLD AUTO: 2.2 10E3/UL (ref 1.6–8.3)
NEUTROPHILS NFR BLD AUTO: 45 %
PLATELET # BLD AUTO: 312 10E3/UL (ref 150–450)
RBC # BLD AUTO: 5.41 10E6/UL (ref 4.4–5.9)
WBC # BLD AUTO: 4.8 10E3/UL (ref 4–11)

## 2024-04-18 PROCEDURE — 36415 COLL VENOUS BLD VENIPUNCTURE: CPT | Performed by: PHYSICIAN ASSISTANT

## 2024-04-18 PROCEDURE — G0481 DRUG TEST DEF 8-14 CLASSES: HCPCS | Performed by: PHYSICIAN ASSISTANT

## 2024-04-18 PROCEDURE — 99207 PR FOOT EXAM NO CHARGE: CPT | Performed by: PHYSICIAN ASSISTANT

## 2024-04-18 PROCEDURE — 85025 COMPLETE CBC W/AUTO DIFF WBC: CPT | Performed by: PHYSICIAN ASSISTANT

## 2024-04-18 PROCEDURE — G0103 PSA SCREENING: HCPCS | Performed by: PHYSICIAN ASSISTANT

## 2024-04-18 PROCEDURE — 82043 UR ALBUMIN QUANTITATIVE: CPT | Performed by: PHYSICIAN ASSISTANT

## 2024-04-18 PROCEDURE — 82570 ASSAY OF URINE CREATININE: CPT | Mod: 59 | Performed by: PHYSICIAN ASSISTANT

## 2024-04-18 PROCEDURE — 80061 LIPID PANEL: CPT | Performed by: PHYSICIAN ASSISTANT

## 2024-04-18 PROCEDURE — 80053 COMPREHEN METABOLIC PANEL: CPT | Performed by: PHYSICIAN ASSISTANT

## 2024-04-18 PROCEDURE — 99396 PREV VISIT EST AGE 40-64: CPT | Performed by: PHYSICIAN ASSISTANT

## 2024-04-18 PROCEDURE — 83036 HEMOGLOBIN GLYCOSYLATED A1C: CPT | Performed by: PHYSICIAN ASSISTANT

## 2024-04-18 PROCEDURE — 99213 OFFICE O/P EST LOW 20 MIN: CPT | Mod: 25 | Performed by: PHYSICIAN ASSISTANT

## 2024-04-18 RX ORDER — BUPRENORPHINE AND NALOXONE 8; 2 MG/1; MG/1
FILM, SOLUBLE BUCCAL; SUBLINGUAL
Qty: 90 FILM | Refills: 2 | Status: SHIPPED | OUTPATIENT
Start: 2024-04-18 | End: 2024-07-19

## 2024-04-18 RX ORDER — ATORVASTATIN CALCIUM 10 MG/1
10 TABLET, FILM COATED ORAL DAILY
Qty: 90 TABLET | Refills: 0 | Status: CANCELLED | OUTPATIENT
Start: 2024-04-18

## 2024-04-18 RX ORDER — CEPHALEXIN 500 MG/1
500 CAPSULE ORAL 2 TIMES DAILY
Qty: 20 CAPSULE | Refills: 0 | Status: SHIPPED | OUTPATIENT
Start: 2024-04-18

## 2024-04-18 RX ORDER — ROSUVASTATIN CALCIUM 10 MG/1
10 TABLET, COATED ORAL DAILY
Qty: 90 TABLET | Refills: 3 | Status: SHIPPED | OUTPATIENT
Start: 2024-04-18 | End: 2024-08-28

## 2024-04-18 RX ORDER — LISINOPRIL 20 MG/1
20 TABLET ORAL DAILY
Qty: 90 TABLET | Refills: 3 | Status: SHIPPED | OUTPATIENT
Start: 2024-04-18 | End: 2024-08-28

## 2024-04-18 RX ORDER — DULOXETIN HYDROCHLORIDE 30 MG/1
30 CAPSULE, DELAYED RELEASE ORAL DAILY
Qty: 90 CAPSULE | Refills: 3 | Status: SHIPPED | OUTPATIENT
Start: 2024-04-18

## 2024-04-18 SDOH — HEALTH STABILITY: PHYSICAL HEALTH: ON AVERAGE, HOW MANY DAYS PER WEEK DO YOU ENGAGE IN MODERATE TO STRENUOUS EXERCISE (LIKE A BRISK WALK)?: 1 DAY

## 2024-04-18 ASSESSMENT — SOCIAL DETERMINANTS OF HEALTH (SDOH): HOW OFTEN DO YOU GET TOGETHER WITH FRIENDS OR RELATIVES?: ONCE A WEEK

## 2024-04-18 ASSESSMENT — PAIN SCALES - GENERAL: PAINLEVEL: MODERATE PAIN (4)

## 2024-04-18 NOTE — PATIENT INSTRUCTIONS
Preventive Care Advice   This is general advice given by our system to help you stay healthy. However, your care team may have specific advice just for you. Please talk to your care team about your preventive care needs.  Nutrition  Eat 5 or more servings of fruits and vegetables each day.  Try wheat bread, brown rice and whole grain pasta (instead of white bread, rice, and pasta).  Get enough calcium and vitamin D. Check the label on foods and aim for 100% of the RDA (recommended daily allowance).  Lifestyle  Exercise at least 150 minutes each week   (30 minutes a day, 5 days a week).  Do muscle strengthening activities 2 days a week. These help control your weight and prevent disease.  No smoking.  Wear sunscreen to prevent skin cancer.  Have a dental exam and cleaning every 6 months.  Yearly exams  See your health care team every year to talk about:  Any changes in your health.  Any medicines your care team has prescribed.  Preventive care, family planning, and ways to prevent chronic diseases.  Shots (vaccines)   HPV shots (up to age 26), if you've never had them before.  Hepatitis B shots (up to age 59), if you've never had them before.  COVID-19 shot: Get this shot when it's due.  Flu shot: Get a flu shot every year.  Tetanus shot: Get a tetanus shot every 10 years.  Pneumococcal, hepatitis A, and RSV shots: Ask your care team if you need these based on your risk.  Shingles shot (for age 50 and up).  General health tests  Diabetes screening:  Starting at age 35, Get screened for diabetes at least every 3 years.  If you are younger than age 35, ask your care team if you should be screened for diabetes.  Cholesterol test: At age 39, start having a cholesterol test every 5 years, or more often if advised.  Bone density scan (DEXA): At age 50, ask your care team if you should have this scan for osteoporosis (brittle bones).  Hepatitis C: Get tested at least once in your life.  STIs (sexually transmitted  infections)  Before age 24: Ask your care team if you should be screened for STIs.  After age 24: Get screened for STIs if you're at risk. You are at risk for STIs (including HIV) if:  You are sexually active with more than one person.  You don't use condoms every time.  You or a partner was diagnosed with a sexually transmitted infection.  If you are at risk for HIV, ask about PrEP medicine to prevent HIV.  Get tested for HIV at least once in your life, whether you are at risk for HIV or not.  Cancer screening tests  Cervical cancer screening: If you have a cervix, begin getting regular cervical cancer screening tests at age 21. Most people who have regular screenings with normal results can stop after age 65. Talk about this with your provider.  Breast cancer scan (mammogram): If you've ever had breasts, begin having regular mammograms starting at age 40. This is a scan to check for breast cancer.  Colon cancer screening: It is important to start screening for colon cancer at age 45.  Have a colonoscopy test every 10 years (or more often if you're at risk) Or, ask your provider about stool tests like a FIT test every year or Cologuard test every 3 years.  To learn more about your testing options, visit: https://www.Sentons/359404.pdf.  For help making a decision, visit: https://bit.ly/lr46791.  Prostate cancer screening test: If you have a prostate and are age 55 to 69, ask your provider if you would benefit from a yearly prostate cancer screening test.  Lung cancer screening: If you are a current or former smoker age 50 to 80, ask your care team if ongoing lung cancer screenings are right for you.  For informational purposes only. Not to replace the advice of your health care provider. Copyright   2023 [employer]. All rights reserved. Clinically reviewed by the [facility]. React[employer] 733752 - REV 01/24.    Learning About Stress  What is stress?     Stress is your  body's response to a hard situation. Your body can have a physical, emotional, or mental response. Stress is a fact of life for most people, and it affects everyone differently. What causes stress for you may not be stressful for someone else.  A lot of things can cause stress. You may feel stress when you go on a job interview, take a test, or run a race. This kind of short-term stress is normal and even useful. It can help you if you need to work hard or react quickly. For example, stress can help you finish an important job on time.  Long-term stress is caused by ongoing stressful situations or events. Examples of long-term stress include long-term health problems, ongoing problems at work, or conflicts in your family. Long-term stress can harm your health.  How does stress affect your health?  When you are stressed, your body responds as though you are in danger. It makes hormones that speed up your heart, make you breathe faster, and give you a burst of energy. This is called the fight-or-flight stress response. If the stress is over quickly, your body goes back to normal and no harm is done.  But if stress happens too often or lasts too long, it can have bad effects. Long-term stress can make you more likely to get sick, and it can make symptoms of some diseases worse. If you tense up when you are stressed, you may develop neck, shoulder, or low back pain. Stress is linked to high blood pressure and heart disease.  Stress also harms your emotional health. It can make you camilo, tense, or depressed. Your relationships may suffer, and you may not do well at work or school.  What can you do to manage stress?  You can try these things to help manage stress:   Do something active. Exercise or activity can help reduce stress. Walking is a great way to get started. Even everyday activities such as housecleaning or yard work can help.  Try yoga or erick chi. These techniques combine exercise and meditation. You may need  some training at first to learn them.  Do something you enjoy. For example, listen to music or go to a movie. Practice your hobby or do volunteer work.  Meditate. This can help you relax, because you are not worrying about what happened before or what may happen in the future.  Do guided imagery. Imagine yourself in any setting that helps you feel calm. You can use online videos, books, or a teacher to guide you.  Do breathing exercises. For example:  From a standing position, bend forward from the waist with your knees slightly bent. Let your arms dangle close to the floor.  Breathe in slowly and deeply as you return to a standing position. Roll up slowly and lift your head last.  Hold your breath for just a few seconds in the standing position.  Breathe out slowly and bend forward from the waist.  Let your feelings out. Talk, laugh, cry, and express anger when you need to. Talking with supportive friends or family, a counselor, or a neelam leader about your feelings is a healthy way to relieve stress. Avoid discussing your feelings with people who make you feel worse.  Write. It may help to write about things that are bothering you. This helps you find out how much stress you feel and what is causing it. When you know this, you can find better ways to cope.  What can you do to prevent stress?  You might try some of these things to help prevent stress:  Manage your time. This helps you find time to do the things you want and need to do.  Get enough sleep. Your body recovers from the stresses of the day while you are sleeping.  Get support. Your family, friends, and community can make a difference in how you experience stress.  Limit your news feed. Avoid or limit time on social media or news that may make you feel stressed.  Do something active. Exercise or activity can help reduce stress. Walking is a great way to get started.  Where can you learn more?  Go to https://www.healthwise.net/patiented  Enter N032 in the  "search box to learn more about \"Learning About Stress.\"  Current as of: October 24, 2023               Content Version: 14.0    6358-4971 [employer].   Care instructions adapted under license by your healthcare professional. If you have questions about a medical condition or this instruction, always ask your healthcare professional. [employer] disclaims any warranty or liability for your use of this information.      "

## 2024-04-18 NOTE — PROGRESS NOTES
Preventive Care Visit  [facility]  Cory Bond PA-C, Family Medicine  Apr 18, 2024      Assessment & Plan     Routine general medical examination at a health care facility    - Comprehensive metabolic panel (BMP + Alb, Alk Phos, ALT, AST, Total. Bili, TP); Future  - CBC with platelets and differential; Future  - Comprehensive metabolic panel (BMP + Alb, Alk Phos, ALT, AST, Total. Bili, TP)  - CBC with platelets and differential    Type 2 diabetes mellitus with diabetic nephropathy, without long-term current use of insulin (H)    - HEMOGLOBIN A1C; Future  - Lipid panel reflex to direct LDL Non-fasting; Future  - Comprehensive metabolic panel (BMP + Alb, Alk Phos, ALT, AST, Total. Bili, TP); Future  - Albumin Random Urine Quantitative with Creat Ratio; Future  - metFORMIN (GLUCOPHAGE) 500 MG tablet; Take 1 tablet (500 mg) by mouth 2 times daily (with meals)  - rosuvastatin (CRESTOR) 10 MG tablet; Take 1 tablet (10 mg) by mouth daily  - HEMOGLOBIN A1C  - Lipid panel reflex to direct LDL Non-fasting  - Comprehensive metabolic panel (BMP + Alb, Alk Phos, ALT, AST, Total. 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  Will start treatment.  - cephALEXin (KEFLEX) 500 MG capsule; Take 1 capsule (500 mg) by mouth 2 times daily  - Orthopedic  Referral; Future    Myalgia    - DULoxetine (CYMBALTA) 30 MG capsule; Take 1 capsule (30 mg) by mouth daily    Opioid use disorder, severe, on maintenance therapy (H)  [place] reviewed, nothing of concern.  Been doing well  - Drug Confirmation Panel Urine with Creat - lab collect; Future  - buprenorphine HCl-naloxone HCl (SUBOXONE) 8-2 MG per film; PLACE 1 FILM UNDER THE TONGUE THREE TIMES DAILY  - Drug Confirmation Panel Urine with Creat - lab collect    Hypertension, unspecified  "type  At goal  - lisinopril (ZESTRIL) 20 MG tablet; Take 1 tablet (20 mg) by mouth daily    Adjustment disorder with mixed anxiety and depressed mood  Interested in counseling  - Adult Mental Health  Referral; Future    Screening PSA (prostate specific antigen)    - PSA, screen; Future  - PSA, screen    Morbid obesity (H)  Discussed dietary and lifestyle changes              BMI  Estimated body mass index is 36.48 kg/m  as calculated from the following:    Height as of this encounter: 1.817 m (5' 11.54\").    Weight as of this encounter: 120.4 kg (265 lb 8 oz).   Weight management plan: Discussed healthy diet and exercise guidelines    Counseling  Appropriate preventive services were discussed with this patient, including applicable screening as appropriate for fall prevention, nutrition, physical activity, Tobacco-use cessation, weight loss and cognition.  Checklist reviewing preventive services available has been given to the patient.  Reviewed patient's diet, addressing concerns and/or questions.   He is at risk for lack of exercise and has been provided with information to increase physical activity for the benefit of his well-being.   The patient was instructed to see the dentist every 6 months.           Latrice Allan is a 63 year old, presenting for the following:  Physical        4/18/2024     8:44 AM   Additional Questions   Roomed by [facility]        Health Care Directive  Patient does not have a Health Care Directive or Living Will: Discussed advance care planning with patient; however, patient declined at this time.    HPI  Reports concern with Prostate and infection in right pointer toe               4/18/2024   General Health   How would you rate your overall physical health? (!) FAIR   Feel stress (tense, anxious, or unable to sleep) To some extent   (!) STRESS CONCERN      4/18/2024   Nutrition   Three or more servings of calcium each day? Yes   Diet: Diabetic   How many servings of fruit " "and vegetables per day? (!) 0-1   How many sweetened beverages each day? 0-1         2024   Exercise   Days per week of moderate/strenous exercise 1 day   (!) EXERCISE CONCERN      2024   Social Factors   Frequency of gathering with friends or relatives Once a week   Worry food won't last until get money to buy more No   Food not last or not have enough money for food? No   Do you have housing?  Yes   Are you worried about losing your housing? No   Lack of transportation? No   Unable to get utilities (heat,electricity)? No         2024   Fall Risk   Fallen 2 or more times in the past year? No   Trouble with walking or balance? No          2024   Dental   Dentist two times every year? (!) NO         2024   TB Screening   Were you born outside of the [place]? No         Today's PHQ-2 Score:       2024     8:34 AM   PHQ-2 (  [employer])   Q1: Little interest or pleasure in doing things 0   Q2: Feeling down, depressed or hopeless 2   PHQ-2 Score 2   Q1: Little interest or pleasure in doing things Not at all   Q2: Feeling down, depressed or hopeless More than half the days   PHQ-2 Score 2           2024   Substance Use   Alcohol more than 3/day or more than 7/wk Not Applicable   Do you use any other substances recreationally? No     Social History     Tobacco Use    Smoking status: Former     Current packs/day: 0.00     Types: Cigarettes     Quit date: 10/1/2021     Years since quittin.5     Passive exposure: Past    Smokeless tobacco: Never   Vaping Use    Vaping status: Never Used             2024   One time HIV Screening   Previous HIV test? Yes         2024   STI Screening   New sexual partner(s) since last STI/HIV test? No   Last PSA: No results found for: \"PSA\"  ASCVD Risk   The 10-year ASCVD risk score (Whit RENEE, et al., 2019) is: 21.1%    Values used to calculate the score:      Age: 63 years      Sex: Male      Is Non- : No      " "Diabetic: Yes      Tobacco smoker: No      Systolic Blood Pressure: 132 mmHg      Is BP treated: Yes      HDL Cholesterol: 53 mg/dL      Total Cholesterol: 154 mg/dL           Reviewed and updated as needed this visit by Provider                    BP Readings from Last 3 Encounters:   04/18/24 132/84   09/07/23 134/81   07/20/23 (!) 166/81    Wt Readings from Last 3 Encounters:   04/18/24 120.4 kg (265 lb 8 oz)   09/07/23 119.5 kg (263 lb 8 oz)   07/20/23 120.6 kg (265 lb 12.8 oz)                      Review of Systems  Constitutional, HEENT, cardiovascular, pulmonary, gi and gu systems are negative, except as otherwise noted.     Objective    Exam  /84   Pulse 72   Temp 97.8  F (36.6  C) (Temporal)   Resp 16   Ht 1.817 m (5' 11.54\")   Wt 120.4 kg (265 lb 8 oz)   SpO2 97%   BMI 36.48 kg/m     Estimated body mass index is 36.48 kg/m  as calculated from the following:    Height as of this encounter: 1.817 m (5' 11.54\").    Weight as of this encounter: 120.4 kg (265 lb 8 oz).    Physical Exam  GENERAL: alert and no distress  EYES: Eyes grossly normal to inspection  HENT: ear canals and TM's normal, nose and mouth without ulcers or lesions  NECK: no adenopathy, no asymmetry, masses, or scars  RESP: lungs clear to auscultation - no rales, rhonchi or wheezes  CV: regular rate and rhythm, normal S1 S2, no S3 or S4, no murmur, click or rub, no peripheral edema  ABDOMEN: soft, nontender, no hepatosplenomegaly, no masses and bowel sounds normal  MS: no gross musculoskeletal defects noted, no edema  NEURO: Normal strength and tone, mentation intact and speech normal  PSYCH: mentation appears normal, affect normal/bright  Diabetic foot exam: thickened nail and large callus formation over feet and toes.  Right middle digiti is erythematous and has scabbed over lesion where he may have had abscess drain based on his history        Signed Electronically by: Cory Bond PA-C    "

## 2024-04-19 LAB
ALBUMIN SERPL BCG-MCNC: 4.4 G/DL (ref 3.5–5.2)
ALP SERPL-CCNC: 58 U/L (ref 40–150)
ALT SERPL W P-5'-P-CCNC: 27 U/L (ref 0–70)
ANION GAP SERPL CALCULATED.3IONS-SCNC: 12 MMOL/L (ref 7–15)
AST SERPL W P-5'-P-CCNC: 22 U/L (ref 0–45)
BILIRUB SERPL-MCNC: 0.5 MG/DL
BUN SERPL-MCNC: 16.7 MG/DL (ref 8–23)
CALCIUM SERPL-MCNC: 9.4 MG/DL (ref 8.8–10.2)
CHLORIDE SERPL-SCNC: 99 MMOL/L (ref 98–107)
CHOLEST SERPL-MCNC: 175 MG/DL
CREAT SERPL-MCNC: 0.93 MG/DL (ref 0.67–1.17)
CREAT UR-MCNC: 110 MG/DL
CREAT UR-MCNC: 110 MG/DL
DEPRECATED HCO3 PLAS-SCNC: 24 MMOL/L (ref 22–29)
EGFRCR SERPLBLD CKD-EPI 2021: >90 ML/MIN/1.73M2
FASTING STATUS PATIENT QL REPORTED: YES
GLUCOSE SERPL-MCNC: 150 MG/DL (ref 70–99)
HDLC SERPL-MCNC: 57 MG/DL
LDLC SERPL CALC-MCNC: 107 MG/DL
MICROALBUMIN UR-MCNC: 14.5 MG/L
MICROALBUMIN/CREAT UR: 13.18 MG/G CR (ref 0–17)
NONHDLC SERPL-MCNC: 118 MG/DL
POTASSIUM SERPL-SCNC: 4.8 MMOL/L (ref 3.4–5.3)
PROT SERPL-MCNC: 7.6 G/DL (ref 6.4–8.3)
SODIUM SERPL-SCNC: 135 MMOL/L (ref 135–145)
TRIGL SERPL-MCNC: 55 MG/DL

## 2024-04-20 LAB — PSA SERPL DL<=0.01 NG/ML-MCNC: 0.72 NG/ML (ref 0–4.5)

## 2024-04-22 ENCOUNTER — TELEPHONE (OUTPATIENT)
Dept: PODIATRY | Facility: CLINIC | Age: 64
End: 2024-04-22
Payer: COMMERCIAL

## 2024-04-22 LAB
BUPRENORPHINE UR CFM-MCNC: 293 NG/ML
BUPRENORPHINE/CREAT UR: 266 NG/MG {CREAT}
NALOXONE UR CFM-MCNC: 771 NG/ML
NALOXONE: 701 NG/MG {CREAT}
NORBUPRENORPHINE UR CFM-MCNC: 671 NG/ML
NORBUPRENORPHINE/CREAT UR: 610 NG/MG {CREAT}

## 2024-04-22 NOTE — TELEPHONE ENCOUNTER
What is the Concern:  Infection  Date the concern started: 04/18  Injury related? no  Is this related to recent surgery?no  Laceration?  No  Body part affected:  Abscess or cellulitis of toe, right   Has Patient been evaluated for condition? yes  Location the patient was evaluated and treated for the condition?   Primary Care, Location [facility]  Who is referring provider, (name and clinic location) Bond  What images have been done? None    Patient on antibiotic, scheduled for tomorrow 04/23    Could we send this information to you in [facility] or would you prefer to receive a phone call?:   Patient would prefer a phone call   Okay to leave a detailed message?: No at Cell number on file:    Telephone Information:   Mobile [phone]

## 2024-04-22 NOTE — TELEPHONE ENCOUNTER
Patient was previously treated for toe ulcer in 2015 and toe wound 1/24/23. Ok to wait until appointment 4/23/24.     HARJIT Mackenzie RN

## 2024-04-30 ENCOUNTER — ANCILLARY PROCEDURE (OUTPATIENT)
Dept: GENERAL RADIOLOGY | Facility: CLINIC | Age: 64
End: 2024-04-30
Attending: PODIATRIST
Payer: COMMERCIAL

## 2024-04-30 ENCOUNTER — OFFICE VISIT (OUTPATIENT)
Dept: PODIATRY | Facility: CLINIC | Age: 64
End: 2024-04-30
Attending: PHYSICIAN ASSISTANT
Payer: COMMERCIAL

## 2024-04-30 VITALS — SYSTOLIC BLOOD PRESSURE: 136 MMHG | DIASTOLIC BLOOD PRESSURE: 79 MMHG | WEIGHT: 265 LBS | BODY MASS INDEX: 36.41 KG/M2

## 2024-04-30 DIAGNOSIS — E11.621 DIABETIC ULCER OF TOE OF RIGHT FOOT ASSOCIATED WITH TYPE 2 DIABETES MELLITUS, WITH FAT LAYER EXPOSED (H): ICD-10-CM

## 2024-04-30 DIAGNOSIS — L03.031 ABSCESS OR CELLULITIS OF TOE, RIGHT: ICD-10-CM

## 2024-04-30 DIAGNOSIS — E11.621 DIABETIC ULCER OF TOE OF RIGHT FOOT ASSOCIATED WITH TYPE 2 DIABETES MELLITUS, WITH FAT LAYER EXPOSED (H): Primary | ICD-10-CM

## 2024-04-30 DIAGNOSIS — L97.512 DIABETIC ULCER OF TOE OF RIGHT FOOT ASSOCIATED WITH TYPE 2 DIABETES MELLITUS, WITH FAT LAYER EXPOSED (H): ICD-10-CM

## 2024-04-30 DIAGNOSIS — L02.611 ABSCESS OR CELLULITIS OF TOE, RIGHT: ICD-10-CM

## 2024-04-30 DIAGNOSIS — L97.512 DIABETIC ULCER OF TOE OF RIGHT FOOT ASSOCIATED WITH TYPE 2 DIABETES MELLITUS, WITH FAT LAYER EXPOSED (H): Primary | ICD-10-CM

## 2024-04-30 PROCEDURE — 99203 OFFICE O/P NEW LOW 30 MIN: CPT | Mod: 25 | Performed by: PODIATRIST

## 2024-04-30 PROCEDURE — 73660 X-RAY EXAM OF TOE(S): CPT | Mod: TC | Performed by: RADIOLOGY

## 2024-04-30 PROCEDURE — 11042 DBRDMT SUBQ TIS 1ST 20SQCM/<: CPT | Performed by: PODIATRIST

## 2024-04-30 NOTE — LETTER
"    4/30/2024         RE: Henrique Nathan  [address] [id number]        Dear Colleague,    Thank you for referring your patient, Henrique Nathan, to the [facility] POD[facility]RY. Please see a copy of my visit note below.    Foot & Ankle Surgery  April 30, 2024    CC: diabetic toe infection    I was asked to see Henrique Nathan regarding the chief complaint by:  GUY Bond PA-C    HPI:  Pt is a 63 year old male who presents with above complaint.  10-year history of issues associated with the distal right second toe.  He was seen in clinic recently and placed on an oral antibiotic secondary to an acute infection.  He states he has been having issues with this for some time.  He soaks it, he states the area can burn and be very painful.  He noticed recently he had a large \"blood sac\" in the area.  No recent imaging.  He was seen by Dr. Lynch for an infection of the right second toe on 1/17/2023 secondary to an ulcer and he had an MRI that showed changes to the toe but no clear signs of osteomyelitis.  His most recent hemoglobin A1c on 4/18/2024 was 7.2    ROS:   Pos for CC.  The patient denies current nausea, vomiting, chills, fevers, belly pain, calf pain, chest pain or SOB.  Complete remainder of ROS is otherwise neg.    VITALS:  There were no vitals filed for this visit.    PMH:    Past Medical History:   Diagnosis Date     Malignant melanoma (H)        SXHX:  No past surgical history on file.     MEDS:    Current Outpatient Medications   Medication Sig Dispense Refill     atorvastatin (LIPITOR) 10 MG tablet Take 1 tablet (10 mg) by mouth daily 90 tablet 0     blood glucose (NO BRAND SPECIFIED) test strip Use to test blood sugar 1 times daily or as directed. To accompany: Blood Glucose Monitor Brands: per insurance. 100 strip 6     blood glucose monitoring (NO BRAND SPECIFIED) meter device kit Use to test blood sugar 1 times daily or as directed. Preferred blood glucose " meter OR supplies to accompany: Blood Glucose Monitor Brands: per insurance. 1 kit 0     buprenorphine HCl-naloxone HCl (SUBOXONE) 8-2 MG per film PLACE 1 FILM UNDER THE TONGUE THREE TIMES DAILY 90 Film 2     cephALEXin (KEFLEX) 500 MG capsule Take 1 capsule (500 mg) by mouth 2 times daily 20 capsule 0     DULoxetine (CYMBALTA) 30 MG capsule Take 1 capsule (30 mg) by mouth daily 90 capsule 3     lisinopril (ZESTRIL) 20 MG tablet Take 1 tablet (20 mg) by mouth daily 90 tablet 3     metFORMIN (GLUCOPHAGE) 500 MG tablet Take 1 tablet (500 mg) by mouth 2 times daily (with meals) 180 tablet 3     rosuvastatin (CRESTOR) 10 MG tablet Take 1 tablet (10 mg) by mouth daily 90 tablet 3     thin (NO BRAND SPECIFIED) lancets Use with lanceting device. To accompany: Blood Glucose Monitor Brands: per insurance. 100 each 6     No current facility-administered medications for this visit.       ALL:   No Known Allergies    FMH:  No family history on file.    SocHx:    Social History     Socioeconomic History     Marital status:      Spouse name: Not on file     Number of children: Not on file     Years of education: Not on file     Highest education level: Not on file   Occupational History     Not on file   Tobacco Use     Smoking status: Former     Current packs/day: 0.00     Types: Cigarettes     Quit date: 10/1/2021     Years since quittin.5     Passive exposure: Past     Smokeless tobacco: Never   Vaping Use     Vaping status: Never Used   Substance and Sexual Activity     Alcohol use: Not on file     Drug use: Not on file     Sexual activity: Not on file   Other Topics Concern     Not on file   Social History Narrative     Not on file     Social Determinants of Health     Financial Resource Strain: Low Risk  (2024)    Financial Resource Strain      Within the past 12 months, have you or your family members you live with been unable to get utilities (heat, electricity) when it was really needed?: No   Food  Insecurity: Low Risk  (4/18/2024)    Food Insecurity      Within the past 12 months, did you worry that your food would run out before you got money to buy more?: No      Within the past 12 months, did the food you bought just not last and you didn t have money to get more?: No   Transportation Needs: Low Risk  (4/18/2024)    Transportation Needs      Within the past 12 months, has lack of transportation kept you from medical appointments, getting your medicines, non-medical meetings or appointments, work, or from getting things that you need?: No   Physical Activity: Unknown (4/18/2024)    Exercise Vital Sign      Days of Exercise per Week: 1 day      Minutes of Exercise per Session: Not on file   Stress: Stress Concern Present (4/18/2024)    [employer] [place] [employer] - Occupational Stress Questionnaire      Feeling of Stress : To some extent   Social Connections: Unknown (4/18/2024)    Social Connection and Isolation Panel [NHANES]      Frequency of Communication with Friends and Family: Not on file      Frequency of Social Gatherings with Friends and Family: Once a week      Attends Church Services: Not on file      Active Member of Clubs or Organizations: Not on file      Attends Club or Organization Meetings: Not on file      Marital Status: Not on file   Interpersonal Safety: Low Risk  (4/18/2024)    Interpersonal Safety      Do you feel physically and emotionally safe where you currently live?: Yes      Within the past 12 months, have you been hit, slapped, kicked or otherwise physically hurt by someone?: No      Within the past 12 months, have you been humiliated or emotionally abused in other ways by your partner or ex-partner?: No   Housing Stability: Low Risk  (4/18/2024)    Housing Stability      Do you have housing? : Yes      Are you worried about losing your housing?: No           EXAMINATION:  Gen:   No apparent distress  Neuro:   A&Ox3, no deficits  Psych:    Answering questions  appropriately for age and situation with normal affect  Head:    NCAT  Eye:    Visual scanning without deficit  Ear:    Response to auditory stimuli wnl  Lung:    Non-labored breathing on RA noted  Abd:    NTND per patient report  Lymph:    Neg for pitting/non-pitting edema BLE  Vasc:    Pulses palpable, CFT minimally delayed  Neuro:    Light touch sensation diminished distally  Derm:    Full-thickness ulceration at the distal aspect of the right second toe.  No clear probing to bone on examination today but this does probe rather deeply.  There is no acute signs of infection but the distal aspect of the toe is quite enlarged  MSK:    Chronically enlarged distal right second toe  Calf:    Neg for redness, swelling or tenderness      Imaging: X-rays right foot -questionable periosteal reaction of the distal plantar aspect of the right second distal phalanx.  No clear erosive or lytic changes are seen.  Awaiting official read    MRI right foot 1/21/2023 - IMPRESSION:     1. Edema infiltrating the soft tissues of the second toe, suspicious for cellulitis. No drainable fluid collection or rim-enhancing abscess identified.     2. Although there is mild diffuse nonspecific edema and contrast enhancement infiltrating the second distal phalanx, which technically can be seen with sequelae of early osteomyelitis, there is no measurable marginal osseous erosion or geographically-marginated focus of decreased T1 signal throughout the second distal phalanx to indicate/confirm nadia osteomyelitis at this time.     3. Osteoarthritic degenerative change of the first metatarsophalangeal joint.   Labs:     Hemoglobin A1C  0.0 - 5.6 % 7.2 High        Assessment:  63 year old male with chronic ulceration distal right second toe in setting of diabetes mellitus with peripheral neuropathy      Medical Decision Making/Plan:  Discussed etiologies, anatomy and options  1.  Chronic ulceration distal right second toe in setting of diabetes  mellitus with peripheral neuropathy  -I personally reviewed and interpreted the patient's lower extremity history pertinent to today's visit, including imaging/labs, in preparation for initiating a treatment program.  -Excisional debridement was performed, full-thickness, sharply debriding the wound down to and including exposed sub-cutaneous tissue/fat, excising nonviable tissue to the above dimensions with a tissue nipper  -Wound care: Wash thoroughly, dry thoroughly, antibiotic ointment and a Band-Aid daily  -I personally interpreted and reviewed the x-rays.  Periosteal reaction noted at the distal plantar second distal phalanx but no clear erosive or lytic changes are seen.  His MRI from January 2023 showed concerning changes but no clear osteomyelitis.  As such, especially based on the chronic nature of the issue and the clinical examination, a repeat MRI of the right foot was ordered.  He will follow-up with me afterwards to review the images and the report and discuss treatment options        Follow up:  after MRI or sooner with acute issues      Patient's medical history was reviewed today      Manuel Thomas DPM [facility]  Podiatric Foot & Ankle Surgeon  [facility]  [phone]    Disclaimer: This note consists of symbols derived from keyboarding, dictation and/or voice recognition software. As a result, there may be errors in the script that have gone undetected. Please consider this when interpreting information found in this chart.          Again, thank you for allowing me to participate in the care of your patient.        Sincerely,        Manuel Thomas DPM, DEBI

## 2024-04-30 NOTE — PROGRESS NOTES
"Foot & Ankle Surgery  April 30, 2024    CC: diabetic toe infection    I was asked to see Henrique Nathan regarding the chief complaint by:  GUY Bond PA-C    HPI:  Pt is a 63 year old male who presents with above complaint.  10-year history of issues associated with the distal right second toe.  He was seen in clinic recently and placed on an oral antibiotic secondary to an acute infection.  He states he has been having issues with this for some time.  He soaks it, he states the area can burn and be very painful.  He noticed recently he had a large \"blood sac\" in the area.  No recent imaging.  He was seen by Dr. Lynch for an infection of the right second toe on 1/17/2023 secondary to an ulcer and he had an MRI that showed changes to the toe but no clear signs of osteomyelitis.  His most recent hemoglobin A1c on 4/18/2024 was 7.2    ROS:   Pos for CC.  The patient denies current nausea, vomiting, chills, fevers, belly pain, calf pain, chest pain or SOB.  Complete remainder of ROS is otherwise neg.    VITALS:  There were no vitals filed for this visit.    PMH:    Past Medical History:   Diagnosis Date    Malignant melanoma (H)        SXHX:  No past surgical history on file.     MEDS:    Current Outpatient Medications   Medication Sig Dispense Refill    atorvastatin (LIPITOR) 10 MG tablet Take 1 tablet (10 mg) by mouth daily 90 tablet 0    blood glucose (NO BRAND SPECIFIED) test strip Use to test blood sugar 1 times daily or as directed. To accompany: Blood Glucose Monitor Brands: per insurance. 100 strip 6    blood glucose monitoring (NO BRAND SPECIFIED) meter device kit Use to test blood sugar 1 times daily or as directed. Preferred blood glucose meter OR supplies to accompany: Blood Glucose Monitor Brands: per insurance. 1 kit 0    buprenorphine HCl-naloxone HCl (SUBOXONE) 8-2 MG per film PLACE 1 FILM UNDER THE TONGUE THREE TIMES DAILY 90 Film 2    cephALEXin (KEFLEX) 500 MG capsule Take 1 capsule (500 mg) by " mouth 2 times daily 20 capsule 0    DULoxetine (CYMBALTA) 30 MG capsule Take 1 capsule (30 mg) by mouth daily 90 capsule 3    lisinopril (ZESTRIL) 20 MG tablet Take 1 tablet (20 mg) by mouth daily 90 tablet 3    metFORMIN (GLUCOPHAGE) 500 MG tablet Take 1 tablet (500 mg) by mouth 2 times daily (with meals) 180 tablet 3    rosuvastatin (CRESTOR) 10 MG tablet Take 1 tablet (10 mg) by mouth daily 90 tablet 3    thin (NO BRAND SPECIFIED) lancets Use with lanceting device. To accompany: Blood Glucose Monitor Brands: per insurance. 100 each 6     No current facility-administered medications for this visit.       ALL:   No Known Allergies    FMH:  No family history on file.    SocHx:    Social History     Socioeconomic History    Marital status:      Spouse name: Not on file    Number of children: Not on file    Years of education: Not on file    Highest education level: Not on file   Occupational History    Not on file   Tobacco Use    Smoking status: Former     Current packs/day: 0.00     Types: Cigarettes     Quit date: 10/1/2021     Years since quittin.5     Passive exposure: Past    Smokeless tobacco: Never   Vaping Use    Vaping status: Never Used   Substance and Sexual Activity    Alcohol use: Not on file    Drug use: Not on file    Sexual activity: Not on file   Other Topics Concern    Not on file   Social History Narrative    Not on file     Social Determinants of Health     Financial Resource Strain: Low Risk  (2024)    Financial Resource Strain     Within the past 12 months, have you or your family members you live with been unable to get utilities (heat, electricity) when it was really needed?: No   Food Insecurity: Low Risk  (2024)    Food Insecurity     Within the past 12 months, did you worry that your food would run out before you got money to buy more?: No     Within the past 12 months, did the food you bought just not last and you didn t have money to get more?: No   Transportation  Needs: Low Risk  (4/18/2024)    Transportation Needs     Within the past 12 months, has lack of transportation kept you from medical appointments, getting your medicines, non-medical meetings or appointments, work, or from getting things that you need?: No   Physical Activity: Unknown (4/18/2024)    Exercise Vital Sign     Days of Exercise per Week: 1 day     Minutes of Exercise per Session: Not on file   Stress: Stress Concern Present (4/18/2024)    [employer] [place] [employer] - Occupational Stress Questionnaire     Feeling of Stress : To some extent   Social Connections: Unknown (4/18/2024)    Social Connection and Isolation Panel [NHANES]     Frequency of Communication with Friends and Family: Not on file     Frequency of Social Gatherings with Friends and Family: Once a week     Attends [employer] Services: Not on file     Active Member of Clubs or Organizations: Not on file     Attends Club or Organization Meetings: Not on file     Marital Status: Not on file   Interpersonal Safety: Low Risk  (4/18/2024)    Interpersonal Safety     Do you feel physically and emotionally safe where you currently live?: Yes     Within the past 12 months, have you been hit, slapped, kicked or otherwise physically hurt by someone?: No     Within the past 12 months, have you been humiliated or emotionally abused in other ways by your partner or ex-partner?: No   Housing Stability: Low Risk  (4/18/2024)    Housing Stability     Do you have housing? : Yes     Are you worried about losing your housing?: No           EXAMINATION:  Gen:   No apparent distress  Neuro:   A&Ox3, no deficits  Psych:    Answering questions appropriately for age and situation with normal affect  Head:    NCAT  Eye:    Visual scanning without deficit  Ear:    Response to auditory stimuli wnl  Lung:    Non-labored breathing on RA noted  Abd:    NTND per patient report  Lymph:    Neg for pitting/non-pitting edema BLE  Vasc:    Pulses palpable, CFT  minimally delayed  Neuro:    Light touch sensation diminished distally  Derm:    Full-thickness ulceration at the distal aspect of the right second toe.  No clear probing to bone on examination today but this does probe rather deeply.  There is no acute signs of infection but the distal aspect of the toe is quite enlarged  MSK:    Chronically enlarged distal right second toe  Calf:    Neg for redness, swelling or tenderness      Imaging: X-rays right foot -questionable periosteal reaction of the distal plantar aspect of the right second distal phalanx.  No clear erosive or lytic changes are seen.  Awaiting official read    MRI right foot 1/21/2023 - IMPRESSION:     1. Edema infiltrating the soft tissues of the second toe, suspicious for cellulitis. No drainable fluid collection or rim-enhancing abscess identified.     2. Although there is mild diffuse nonspecific edema and contrast enhancement infiltrating the second distal phalanx, which technically can be seen with sequelae of early osteomyelitis, there is no measurable marginal osseous erosion or geographically-marginated focus of decreased T1 signal throughout the second distal phalanx to indicate/confirm nadia osteomyelitis at this time.     3. Osteoarthritic degenerative change of the first metatarsophalangeal joint.   Labs:     Hemoglobin A1C  0.0 - 5.6 % 7.2 High        Assessment:  63 year old male with chronic ulceration distal right second toe in setting of diabetes mellitus with peripheral neuropathy      Medical Decision Making/Plan:  Discussed etiologies, anatomy and options  1.  Chronic ulceration distal right second toe in setting of diabetes mellitus with peripheral neuropathy  -I personally reviewed and interpreted the patient's lower extremity history pertinent to today's visit, including imaging/labs, in preparation for initiating a treatment program.  -Excisional debridement was performed, full-thickness, sharply debriding the wound down to and  including exposed sub-cutaneous tissue/fat, excising nonviable tissue to the above dimensions with a tissue nipper  -Wound care: Wash thoroughly, dry thoroughly, antibiotic ointment and a Band-Aid daily  -I personally interpreted and reviewed the x-rays.  Periosteal reaction noted at the distal plantar second distal phalanx but no clear erosive or lytic changes are seen.  His MRI from January 2023 showed concerning changes but no clear osteomyelitis.  As such, especially based on the chronic nature of the issue and the clinical examination, a repeat MRI of the right foot was ordered.  He will follow-up with me afterwards to review the images and the report and discuss treatment options        Follow up:  after MRI or sooner with acute issues      Patient's medical history was reviewed today      Manuel Thomas DPM [facility]AC[facility]  Podiatric Foot & Ankle Surgeon  [facility]  [phone]    Disclaimer: This note consists of symbols derived from keyboarding, dictation and/or voice recognition software. As a result, there may be errors in the script that have gone undetected. Please consider this when interpreting information found in this chart.

## 2024-04-30 NOTE — PATIENT INSTRUCTIONS
Thank you for choosing [facility] Podiatry / Foot & Ankle Surgery!    DR. POWELL'S CLINIC LOCATIONS:     [facility] (Friday) TRIAGE LINE: [phone] [address]  APPOINTMENTS: [phone]   [facility] [place] [address] RADIOLOGY: [phone]    PHYSICAL THERAPY: [phone]    SET UP SURGERY: [phone]   [place] (Mon-Tues AM-Thurs) BILLING QUESTIONS: [phone] [address]  [address] FAX: [phone]   [place] Esparza [address] [facility]: [phone]      You were seen today for the chronic right second toe issue including a distal ulceration.  The toe appears quite enlarged.  There is no clear evidence on today's x-rays of underlying bone infection.  I have ordered an MRI for further assessment, he can call the above radiology number to schedule the imaging study.  Follow-up with me a few days after the imaging has been done so we can review the report and the images and discuss appropriate treatment options based on those results    Wound care: Wash thoroughly, dry thoroughly, antibiotic ointment and a Band-Aid daily.  Finish current antibiotics.  Call prior with any questions

## 2024-05-06 ENCOUNTER — HOSPITAL ENCOUNTER (OUTPATIENT)
Dept: MRI IMAGING | Facility: CLINIC | Age: 64
Discharge: HOME | End: 2024-05-06
Attending: PODIATRIST | Admitting: PODIATRIST
Payer: COMMERCIAL

## 2024-05-06 DIAGNOSIS — L02.611 ABSCESS OR CELLULITIS OF TOE, RIGHT: ICD-10-CM

## 2024-05-06 DIAGNOSIS — E11.621 DIABETIC ULCER OF TOE OF RIGHT FOOT ASSOCIATED WITH TYPE 2 DIABETES MELLITUS, WITH FAT LAYER EXPOSED (H): ICD-10-CM

## 2024-05-06 DIAGNOSIS — L03.031 ABSCESS OR CELLULITIS OF TOE, RIGHT: ICD-10-CM

## 2024-05-06 DIAGNOSIS — L97.512 DIABETIC ULCER OF TOE OF RIGHT FOOT ASSOCIATED WITH TYPE 2 DIABETES MELLITUS, WITH FAT LAYER EXPOSED (H): ICD-10-CM

## 2024-05-06 PROCEDURE — 73718 MRI LOWER EXTREMITY W/O DYE: CPT | Mod: 26 | Performed by: RADIOLOGY

## 2024-05-06 PROCEDURE — 73718 MRI LOWER EXTREMITY W/O DYE: CPT | Mod: RT

## 2024-06-06 ENCOUNTER — OFFICE VISIT (OUTPATIENT)
Dept: DERMATOLOGY | Facility: CLINIC | Age: 64
End: 2024-06-06
Payer: COMMERCIAL

## 2024-06-06 DIAGNOSIS — Z12.83 ENCOUNTER FOR SCREENING FOR MALIGNANT NEOPLASM OF SKIN: ICD-10-CM

## 2024-06-06 DIAGNOSIS — D18.01 CHERRY ANGIOMA: ICD-10-CM

## 2024-06-06 DIAGNOSIS — L81.4 LENTIGINES: ICD-10-CM

## 2024-06-06 DIAGNOSIS — D22.9 MULTIPLE BENIGN NEVI: Primary | ICD-10-CM

## 2024-06-06 DIAGNOSIS — L82.1 SEBORRHEIC KERATOSES: ICD-10-CM

## 2024-06-06 DIAGNOSIS — Z85.820 HX OF MALIGNANT MELANOMA: ICD-10-CM

## 2024-06-06 PROCEDURE — 99213 OFFICE O/P EST LOW 20 MIN: CPT | Performed by: NURSE PRACTITIONER

## 2024-06-06 NOTE — PROGRESS NOTES
[facility] Dermatology Note  Encounter Date: Jun 6, 2024  Office Visit     Reviewed patients past medical history and pertinent chart review prior to patients visit today.     Dermatology Problem List:  History of melanoma:   -Malignant melanoma, Right mid back, s/p wide local excision and sentinel lymph node biopsy   (negative), 01/2013. Breslow's depth was 1.15 mm stage IB ([facility])  -Melanoma in situ, s/p mohs 1/25/24 (oscar)  2. Onychomadesis of toenail    3. History of dysplastic nevus    - right posterior shoulder, excised 01/2013   - left upper abdomen, moderately dysplastic, 2013   - left upper back, moderate to severely dysplastic, 2014    ____________________________________________    Assessment & Plan:     # history of melanoma and dysplastic nevi. Well healed scar without signs of recurrent malignancy or pigmentation. No lymphadenopathy.      # Benign skin findings including: seborrheic keratoses, cherry angioma, lentigines and benign nevi.   - No further intervention required. Patient to report changes.   - Patient reassured of the benign nature of these lesions.    #Signs and Symptoms of non-melanoma skin cancer and ABCDEs of melanoma reviewed with patient. Patient encouraged to perform monthly self skin exams and educated on how to perform them. UV precautions reviewed with patient. Patient was asked about new or changing moles/lesions on body.     #Reviewed Sunscreen: Apply 20 minutes prior to going outdoors and reapply every two hours, when wet or sweating. We recommend using an SPF 30 or higher, and to use one that is water resistant.       Follow-up: 6 months for follow up full body skin exam, prn for new or changing lesions or new concerns    Mayela Lemos CNP  Dermatology     ____________________________________________    CC: Skin Check (Melanoma in situ on left clavicle and back)    HPI:  Mr. Henrique Nathan is a(n) 63 year old male who presents today as a  new patient for a full body skin cancer screening. Patient has no specific concerns today. He has a history of malanoma x2 and DN.     Patient denies any fever, weight loss, swollen lymph nodes, loss of appetite or energy, and has been feeling well.      Physical Exam:  Vitals: There were no vitals taken for this visit.  SKIN: Total skin excluding the genitalia areas was performed. The exam included the head/face, neck, both arms, chest, back, abdomen, both legs, digits, mons pubis, buttock and nails.   -left clavicle, Well healed scar without signs of recurrent malignancy.   -right mid back, well healed scar without repigmentation   -no axillary, cervical, supraclavicular, preauricular, popliteal, inguinal, epitrochlear lymphadenopathy.    -several 1-2mm red dome shaped symmetric papules scattered on the trunk  -multiple tan/brown flat round macules and raised papules scattered throughout trunk, extremities and head. No worrisome features for malignancy noted on examination.  -scattered tan, homogenous macules scattered on sun exposed areas of trunk, extremities and face.   -scattered waxy, stuck on tan/brown papules and patches on the trunk     - No other lesions of concern on areas examined.     Medications:  Current Outpatient Medications   Medication Sig Dispense Refill    atorvastatin (LIPITOR) 10 MG tablet Take 1 tablet (10 mg) by mouth daily 90 tablet 0    blood glucose (NO BRAND SPECIFIED) test strip Use to test blood sugar 1 times daily or as directed. To accompany: Blood Glucose Monitor Brands: per insurance. 100 strip 6    blood glucose monitoring (NO BRAND SPECIFIED) meter device kit Use to test blood sugar 1 times daily or as directed. Preferred blood glucose meter OR supplies to accompany: Blood Glucose Monitor Brands: per insurance. 1 kit 0    buprenorphine HCl-naloxone HCl (SUBOXONE) 8-2 MG per film PLACE 1 FILM UNDER THE TONGUE THREE TIMES DAILY 90 Film 2    cephALEXin (KEFLEX) 500 MG capsule Take  1 capsule (500 mg) by mouth 2 times daily 20 capsule 0    DULoxetine (CYMBALTA) 30 MG capsule Take 1 capsule (30 mg) by mouth daily 90 capsule 3    lisinopril (ZESTRIL) 20 MG tablet Take 1 tablet (20 mg) by mouth daily 90 tablet 3    metFORMIN (GLUCOPHAGE) 500 MG tablet Take 1 tablet (500 mg) by mouth 2 times daily (with meals) 180 tablet 3    rosuvastatin (CRESTOR) 10 MG tablet Take 1 tablet (10 mg) by mouth daily 90 tablet 3    thin (NO BRAND SPECIFIED) lancets Use with lanceting device. To accompany: Blood Glucose Monitor Brands: per insurance. 100 each 6     No current facility-administered medications for this visit.      Past Medical History:   Patient Active Problem List   Diagnosis    History of substance abuse (H)    Type 2 diabetes mellitus with diabetic nephropathy, without long-term current use of insulin (H)    Obstructive sleep apnea syndrome    Morbid obesity (H)    Mild opioid use disorder (H)    Hx of malignant melanoma     Past Medical History:   Diagnosis Date    Malignant melanoma (H)        CC Cory Bond PA-C  [address] [facility] [address]  [place] [address] on close of this encounter.

## 2024-06-06 NOTE — LETTER
6/6/2024      Henrique Nathan  [address] [id number]      Dear Colleague,    Thank you for referring your patient, Henrique Nathan, to the [facility]. Please see a copy of my visit note below.    [facility] Dermatology Note  Encounter Date: Jun 6, 2024  Office Visit     Reviewed patients past medical history and pertinent chart review prior to patients visit today.     Dermatology Problem List:  History of melanoma:   -Malignant melanoma, Right mid back, s/p wide local excision and sentinel lymph node biopsy   (negative), 01/2013. Breslow's depth was 1.15 mm stage IB ([facility])  -Melanoma in situ, s/p mohs 1/25/24 ([facility]car)  2. Onychomadesis of toenail    3. History of dysplastic nevus    - right posterior shoulder, excised 01/2013   - left upper abdomen, moderately dysplastic, 2013   - left upper back, moderate to severely dysplastic, 2014    ____________________________________________    Assessment & Plan:     # history of melanoma and dysplastic nevi. Well healed scar without signs of recurrent malignancy or pigmentation. No lymphadenopathy.      # Benign skin findings including: seborrheic keratoses, cherry angioma, lentigines and benign nevi.   - No further intervention required. Patient to report changes.   - Patient reassured of the benign nature of these lesions.    #Signs and Symptoms of non-melanoma skin cancer and ABCDEs of melanoma reviewed with patient. Patient encouraged to perform monthly self skin exams and educated on how to perform them. UV precautions reviewed with patient. Patient was asked about new or changing moles/lesions on body.     #Reviewed Sunscreen: Apply 20 minutes prior to going outdoors and reapply every two hours, when wet or sweating. We recommend using an SPF 30 or higher, and to use one that is water resistant.       Follow-up: 6 months for follow up full body skin exam, prn for new or changing  lesions or new concerns    Mayela Lemos CNP  Dermatology     ____________________________________________    CC: Skin Check (Melanoma in situ on left clavicle and back)    HPI:  Mr. Henrique Nathan is a(n) 63 year old male who presents today as a new patient for a full body skin cancer screening. Patient has no specific concerns today. He has a history of malanoma x2 and DN.     Patient denies any fever, weight loss, swollen lymph nodes, loss of appetite or energy, and has been feeling well.      Physical Exam:  Vitals: There were no vitals taken for this visit.  SKIN: Total skin excluding the genitalia areas was performed. The exam included the head/face, neck, both arms, chest, back, abdomen, both legs, digits, mons pubis, buttock and nails.   -left clavicle, Well healed scar without signs of recurrent malignancy.   -right mid back, well healed scar without repigmentation   -no axillary, cervical, supraclavicular, preauricular, popliteal, inguinal, epitrochlear lymphadenopathy.    -several 1-2mm red dome shaped symmetric papules scattered on the trunk  -multiple tan/brown flat round macules and raised papules scattered throughout trunk, extremities and head. No worrisome features for malignancy noted on examination.  -scattered tan, homogenous macules scattered on sun exposed areas of trunk, extremities and face.   -scattered waxy, stuck on tan/brown papules and patches on the trunk     - No other lesions of concern on areas examined.     Medications:  Current Outpatient Medications   Medication Sig Dispense Refill     atorvastatin (LIPITOR) 10 MG tablet Take 1 tablet (10 mg) by mouth daily 90 tablet 0     blood glucose (NO BRAND SPECIFIED) test strip Use to test blood sugar 1 times daily or as directed. To accompany: Blood Glucose Monitor Brands: per insurance. 100 strip 6     blood glucose monitoring (NO BRAND SPECIFIED) meter device kit Use to test blood sugar 1 times daily or as directed. Preferred blood  glucose meter OR supplies to accompany: Blood Glucose Monitor Brands: per insurance. 1 kit 0     buprenorphine HCl-naloxone HCl (SUBOXONE) 8-2 MG per film PLACE 1 FILM UNDER THE TONGUE THREE TIMES DAILY 90 Film 2     cephALEXin (KEFLEX) 500 MG capsule Take 1 capsule (500 mg) by mouth 2 times daily 20 capsule 0     DULoxetine (CYMBALTA) 30 MG capsule Take 1 capsule (30 mg) by mouth daily 90 capsule 3     lisinopril (ZESTRIL) 20 MG tablet Take 1 tablet (20 mg) by mouth daily 90 tablet 3     metFORMIN (GLUCOPHAGE) 500 MG tablet Take 1 tablet (500 mg) by mouth 2 times daily (with meals) 180 tablet 3     rosuvastatin (CRESTOR) 10 MG tablet Take 1 tablet (10 mg) by mouth daily 90 tablet 3     thin (NO BRAND SPECIFIED) lancets Use with lanceting device. To accompany: Blood Glucose Monitor Brands: per insurance. 100 each 6     No current facility-administered medications for this visit.      Past Medical History:   Patient Active Problem List   Diagnosis     History of substance abuse (H)     Type 2 diabetes mellitus with diabetic nephropathy, without long-term current use of insulin (H)     Obstructive sleep apnea syndrome     Morbid obesity (H)     Mild opioid use disorder (H)     Hx of malignant melanoma     Past Medical History:   Diagnosis Date     Malignant melanoma (H)        CC Cory Bond PA-C  [address] [facility] [facility] [address]  [place] [address] on close of this encounter.      Again, thank you for allowing me to participate in the care of your patient.        Sincerely,        Vicki Lemos, TA CNP

## 2024-07-19 DIAGNOSIS — F11.20 OPIOID USE DISORDER, SEVERE, ON MAINTENANCE THERAPY (H): ICD-10-CM

## 2024-07-19 RX ORDER — BUPRENORPHINE AND NALOXONE 8; 2 MG/1; MG/1
FILM, SOLUBLE BUCCAL; SUBLINGUAL
Qty: 90 FILM | Refills: 2 | Status: SHIPPED | OUTPATIENT
Start: 2024-07-19

## 2024-07-19 NOTE — TELEPHONE ENCOUNTER
JS,      Routing refill request to provider for review/approval because:  Drug not on the [facility] refill protocol   Last OV 4/18  Future VV scheduled for 7/22.    Patient has #2 films left    Thank you,  Nataliia Barnes RN

## 2024-07-23 ENCOUNTER — VIRTUAL VISIT (OUTPATIENT)
Dept: FAMILY MEDICINE | Facility: CLINIC | Age: 64
End: 2024-07-23
Payer: COMMERCIAL

## 2024-07-23 DIAGNOSIS — G47.00 INSOMNIA, UNSPECIFIED TYPE: ICD-10-CM

## 2024-07-23 DIAGNOSIS — F11.20 OPIOID USE DISORDER, SEVERE, ON MAINTENANCE THERAPY (H): Primary | ICD-10-CM

## 2024-07-23 PROCEDURE — 99441 PR PHYSICIAN TELEPHONE EVALUATION 5-10 MIN: CPT | Mod: 93 | Performed by: PHYSICIAN ASSISTANT

## 2024-07-23 RX ORDER — TRAZODONE HYDROCHLORIDE 50 MG/1
50 TABLET, FILM COATED ORAL AT BEDTIME
Qty: 30 TABLET | Refills: 2 | Status: SHIPPED | OUTPATIENT
Start: 2024-07-23

## 2024-07-23 NOTE — PROGRESS NOTES
Jerrell is a 63 year old who is being evaluated via a billable telephone visit.    What phone number would you like to be contacted at? [phone]   How would you like to obtain your AVS? MyChart  Originating Location (pt. Location): Home    Distant Location (provider location):  Off-site    Assessment & Plan     Opioid use disorder, severe, on maintenance therapy (H)  Doing well on suboxone.  Continues to go to support meetings most days.      Insomnia, unspecified type  Had been on this in the past and was an effective tool when he has struggled, would like to retry  - traZODone (DESYREL) 50 MG tablet; Take 1 tablet (50 mg) by mouth at bedtime                Subjective   Jerrell is a 63 year old, presenting for the following health issues:  Recheck Medication and Follow Up        4/18/2024     8:44 AM   Additional Questions   Roomed by Shelby JOYNER             Review of Systems  Constitutional, HEENT, cardiovascular, pulmonary, gi and gu systems are negative, except as otherwise noted.      Objective           Vitals:  No vitals were obtained today due to virtual visit.    Physical Exam   General: Alert and no distress //Respiratory: No audible wheeze, cough, or shortness of breath // Psychiatric:  Appropriate affect, tone, and pace of words            Phone call duration: 8 minutes  Signed Electronically by: Cory Bond PA-C

## 2024-07-26 DIAGNOSIS — E11.21 TYPE 2 DIABETES MELLITUS WITH DIABETIC NEPHROPATHY, WITHOUT LONG-TERM CURRENT USE OF INSULIN (H): ICD-10-CM

## 2024-07-29 RX ORDER — ATORVASTATIN CALCIUM 10 MG/1
10 TABLET, FILM COATED ORAL DAILY
Qty: 90 TABLET | Refills: 0 | Status: SHIPPED | OUTPATIENT
Start: 2024-07-29

## 2024-08-11 ENCOUNTER — HEALTH MAINTENANCE LETTER (OUTPATIENT)
Age: 64
End: 2024-08-11

## 2024-08-27 ENCOUNTER — ANCILLARY PROCEDURE (OUTPATIENT)
Dept: GENERAL RADIOLOGY | Facility: CLINIC | Age: 64
End: 2024-08-27
Attending: INTERNAL MEDICINE
Payer: COMMERCIAL

## 2024-08-27 ENCOUNTER — OFFICE VISIT (OUTPATIENT)
Dept: FAMILY MEDICINE | Facility: CLINIC | Age: 64
End: 2024-08-27
Payer: COMMERCIAL

## 2024-08-27 VITALS
RESPIRATION RATE: 16 BRPM | HEART RATE: 59 BPM | SYSTOLIC BLOOD PRESSURE: 153 MMHG | TEMPERATURE: 97.5 F | DIASTOLIC BLOOD PRESSURE: 80 MMHG | OXYGEN SATURATION: 98 % | WEIGHT: 276.2 LBS | BODY MASS INDEX: 36.6 KG/M2 | HEIGHT: 73 IN

## 2024-08-27 DIAGNOSIS — J01.10 SUBACUTE FRONTAL SINUSITIS: ICD-10-CM

## 2024-08-27 DIAGNOSIS — I10 HYPERTENSION, UNSPECIFIED TYPE: ICD-10-CM

## 2024-08-27 DIAGNOSIS — K21.9 GASTROESOPHAGEAL REFLUX DISEASE, UNSPECIFIED WHETHER ESOPHAGITIS PRESENT: ICD-10-CM

## 2024-08-27 DIAGNOSIS — R07.9 LEFT-SIDED CHEST PAIN: Primary | ICD-10-CM

## 2024-08-27 DIAGNOSIS — E11.21 TYPE 2 DIABETES MELLITUS WITH DIABETIC NEPHROPATHY, WITHOUT LONG-TERM CURRENT USE OF INSULIN (H): ICD-10-CM

## 2024-08-27 DIAGNOSIS — R10.13 EPIGASTRIC DISCOMFORT: ICD-10-CM

## 2024-08-27 DIAGNOSIS — R07.9 LEFT-SIDED CHEST PAIN: ICD-10-CM

## 2024-08-27 DIAGNOSIS — R93.89 ABNORMAL X-RAY EXAMINATION: ICD-10-CM

## 2024-08-27 DIAGNOSIS — R06.00 DYSPNEA, UNSPECIFIED TYPE: ICD-10-CM

## 2024-08-27 DIAGNOSIS — R60.0 PERIPHERAL EDEMA: ICD-10-CM

## 2024-08-27 DIAGNOSIS — E66.01 MORBID OBESITY (H): ICD-10-CM

## 2024-08-27 PROBLEM — R73.09 ELEVATED HEMOGLOBIN A1C: Status: ACTIVE | Noted: 2018-02-22

## 2024-08-27 LAB
D DIMER PPP FEU-MCNC: <0.27 UG/ML FEU (ref 0–0.5)
HBA1C MFR BLD: 7.6 % (ref 0–5.6)
NT-PROBNP SERPL-MCNC: <36 PG/ML (ref 0–900)
TROPONIN T SERPL HS-MCNC: 10 NG/L

## 2024-08-27 PROCEDURE — 93000 ELECTROCARDIOGRAM COMPLETE: CPT | Performed by: INTERNAL MEDICINE

## 2024-08-27 PROCEDURE — 36415 COLL VENOUS BLD VENIPUNCTURE: CPT | Performed by: INTERNAL MEDICINE

## 2024-08-27 PROCEDURE — 85379 FIBRIN DEGRADATION QUANT: CPT | Performed by: INTERNAL MEDICINE

## 2024-08-27 PROCEDURE — 80048 BASIC METABOLIC PNL TOTAL CA: CPT | Performed by: INTERNAL MEDICINE

## 2024-08-27 PROCEDURE — 99215 OFFICE O/P EST HI 40 MIN: CPT | Performed by: INTERNAL MEDICINE

## 2024-08-27 PROCEDURE — 84484 ASSAY OF TROPONIN QUANT: CPT | Performed by: INTERNAL MEDICINE

## 2024-08-27 PROCEDURE — 71046 X-RAY EXAM CHEST 2 VIEWS: CPT | Mod: TC | Performed by: RADIOLOGY

## 2024-08-27 PROCEDURE — 83880 ASSAY OF NATRIURETIC PEPTIDE: CPT | Performed by: INTERNAL MEDICINE

## 2024-08-27 PROCEDURE — 83036 HEMOGLOBIN GLYCOSYLATED A1C: CPT | Performed by: INTERNAL MEDICINE

## 2024-08-27 RX ORDER — METFORMIN HCL 500 MG
1000 TABLET, EXTENDED RELEASE 24 HR ORAL 2 TIMES DAILY WITH MEALS
Qty: 360 TABLET | Refills: 1 | Status: SHIPPED | OUTPATIENT
Start: 2024-08-27

## 2024-08-27 RX ORDER — FLUTICASONE PROPIONATE 50 MCG
1 SPRAY, SUSPENSION (ML) NASAL DAILY
Qty: 18.2 ML | Refills: 1 | Status: SHIPPED | OUTPATIENT
Start: 2024-08-27

## 2024-08-27 RX ORDER — LISINOPRIL 30 MG/1
30 TABLET ORAL DAILY
Qty: 90 TABLET | Refills: 1 | Status: SHIPPED | OUTPATIENT
Start: 2024-08-27

## 2024-08-27 ASSESSMENT — PAIN SCALES - GENERAL: PAINLEVEL: NO PAIN (0)

## 2024-08-27 NOTE — PROGRESS NOTES
Assessment and Plan  1. Left-sided chest pain  New problem which patient endorses that this started since 8-10 days back . 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 States it is on left side of chest 4/10 severity and constant . Denies any radiation.   -Physical exam negative for any reproducible chest pain, differential diagnosis of ACS, PE, pneumothorax will need to be excluded.  Red flag symptoms explained to the patient and ER precautions given.  Patient understood and agreed the plan.  UPDATE -EKG showing positive for borderline wide QRS waves in the anterior and lateral leads but no definitive ST elevation significantly seen.  Will await for troponin before further recommendations, patient notified.  ER precautions given again.  - D dimer, quantitative; Future  - EKG 12-lead complete w/read - Clinics  - Troponin T, High Sensitivity; Future  - XR Chest 2 Views; Future    2. Dyspnea, unspecified type  New problem, patient does have left chest pain with dyspnea on deep breathing.  Will consider checking for exclusion of PE versus any pneumothorax cannot be excluded.  Will check x-ray as well.  - D dimer, quantitative; Future  - XR Chest 2 Views; Future    3. Peripheral edema  New problem with trace peripheral edema bilaterally pitting type, will consider checking BNP given his left chest pain as mentioned above.  - BNP-N terminal pro; Future    4. Type 2 diabetes mellitus with diabetic nephropathy, without long-term current use of insulin (H)  Well-controlled on the last check, continue current metformin.  Will recheck and do further recommendations.  UPDATE - Uncontrolled, mildly worsened from the past . Will change the Metformin to XR for normalization as well as to help in weight loss.   - HEMOGLOBIN A1C; Future  - Adult Eye  Referral; Future  - metFORMIN (GLUCOPHAGE XR) 500 MG 24 hr tablet; Take 2 tablets (1,000 mg) by mouth 2 times  daily (with meals).  Dispense: 360 tablet; Refill: 1    5. Morbid obesity (H)  Noted, patient BMI at 36.88 which she is working on his diet and lifestyle modifications as well.  Discussed on his current medication metformin which he is on currently for diabetes will also help him weight loss.  Will check A1c as mentioned above and to further recommendations.  Patient understood and agreed with the plan.    6. Hypertension, unspecified type  Uncontrolled, patient blood pressure remaining elevated as he endorses that it has been high most of the times in spite of current lisinopril 20 mg daily.  Shared decision to increase this to 30 mg and follow-up instructions given as mentioned in the AVS below.  - lisinopril (ZESTRIL) 30 MG tablet; Take 1 tablet (30 mg) by mouth daily.  Dispense: 90 tablet; Refill: 1    7. Subacute frontal sinusitis  New problem which patient endorses that has been having some mild sinus congestion.  Physical exam negative for acute sinusitis at this time, will treat this as subacute frontal sinusitis.  Flonase nasal spray sent to pharmacy.  Patient understood and agreed the plan.  - fluticasone (FLONASE) 50 MCG/ACT nasal spray; Spray 1 spray into both nostrils daily.  Dispense: 18.2 mL; Refill: 1    8. Gastroesophageal reflux disease, unspecified whether esophagitis present  9. Epigastric discomfort  New problem, given the above concerns will consider Omeprazole for improvement.   - omeprazole (PRILOSEC) 20 MG DR capsule; Take 1 capsule (20 mg) by mouth daily.  Dispense: 90 capsule; Refill: 1    UPDATE     1. Left-sided chest pain  2. Dyspnea, unspecified type  10. Abnormal x-ray examination  - CT Chest w Contrast; Future  - Basic metabolic panel  (Ca, Cl, CO2, Creat, Gluc, K, Na, BUN); Future      Over 40 minutes spent on reviewing patient chart,  face to face encounter, greater than 50% time spent with plan/cordination of care and documentation as above in my A/P.           Please note that  "this note consists of symbols derived from keyboarding, dictation and/or voice recognition software. As a result, there may be errors in the script that have gone undetected. Please consider this when interpreting information found in this chart.    Patient Instructions   As discussed , Will make sure to check the needed blood markers for heart as well as lung clot if any causing your symptoms.     Will check Chest X ray if there is any lung related concerns causing your symptoms     Flonase nasal spray sent to pharmacy for possible sinusitis     Please do Eye exam for possible Vision causes to be excluded, referral placed    Will increase the dose of Lisinopril for better control of HTN which could cause headaches.     Please go to ER for worsening chest pain.     ====================================      Return in about 4 weeks (around 9/24/2024), or if symptoms worsen or fail to improve, for BP Recheck, If symptoms persist, Follow up of last visit.    China Waldrop MD  [facility] 63 [facility], presenting for the following health issues:  Diabetes and Headache (X1 week head pressure and in left lung)        8/27/2024     7:32 AM   Additional Questions   Roomed by Sara HART       Via the Health Maintenance questionnaire, the patient has reported the following services have been completed -Colonscopy: mngi 2022-05-10, this information has been sent to the abstraction team.  History of Present Illness       Headaches:   Since the patient's last clinic visit, headaches are: no change  The patient is getting headaches:  I just feel presure above eyes  He is able to do normal daily activities when he has a migraine.  The patient is taking the following rescue/relief medications:  No rescue/relief medications   Patient states \"I get no relief\" from the rescue/relief medications.   The patient is taking the following medications to prevent migraines:  No medications " to prevent migraines  In the past 4 weeks, the patient has gone to an Urgent Care or Emergency Room 0 times times due to headaches.    Reason for visit:  Lung pain or presure in chest  Symptom onset:  1-2 weeks ago  Symptom intensity:  Moderate  Symptom progression:  Staying the same  Had these symptoms before:  No  What makes it worse:  Activety He is missing 1 dose(s) of medications per week.       Diabetes Follow-up    How often are you checking your blood sugar? Not at all  What concerns do you have today about your diabetes? None   Do you have any of these symptoms? (Select all that apply)  No numbness or tingling in feet.  No redness, sores or blisters on feet.  No complaints of excessive thirst.  No reports of blurry vision.  No significant changes to weight.  Have you had a diabetic eye exam in the last 12 months? No      Patient is new to me, [facility].  Does follow with them for opioid use disorder on maintenance Suboxone as managed by PCP.     Does have medical conditions of diabetes on metformin as well as currently on Cymbalta and being treated for abscess or cellulitis of the right toe with Keflex.    Brief Hx of former smoking for 3 yrs . Last CT chest for trauma in 2021 at care everywhere.     Pt is here acute concerns of lung symptoms. Last CXR in [facility] in 1/2023 normal.       BP Readings from Last 2 Encounters:   08/27/24 (!) 153/80   04/30/24 136/79     Hemoglobin A1C (%)   Date Value   04/18/2024 7.2 (H)   03/20/2023 6.9 (H)     LDL Cholesterol Calculated (mg/dL)   Date Value   04/18/2024 107 (H)   03/20/2023 88       How many days per week do you miss taking your medication? 0     No Known Allergies     Past Medical History:   Diagnosis Date    Malignant melanoma (H)        History reviewed. No pertinent surgical history.    History reviewed. No pertinent family history.    Social History     Tobacco Use    Smoking status: Former     Current packs/day: 0.00     Types:  Cigarettes     Quit date: 10/1/2021     Years since quittin.9     Passive exposure: Past    Smokeless tobacco: Current     Types: Chew   Substance Use Topics    Alcohol use: Not on file        Current Outpatient Medications   Medication Sig Dispense Refill    atorvastatin (LIPITOR) 10 MG tablet TAKE 1 TABLET(10 MG) BY MOUTH DAILY 90 tablet 0    DULoxetine (CYMBALTA) 30 MG capsule Take 1 capsule (30 mg) by mouth daily 90 capsule 3    fluticasone (FLONASE) 50 MCG/ACT nasal spray Spray 1 spray into both nostrils daily. 18.2 mL 1    lisinopril (ZESTRIL) 20 MG tablet Take 1 tablet (20 mg) by mouth daily 90 tablet 3    lisinopril (ZESTRIL) 30 MG tablet Take 1 tablet (30 mg) by mouth daily. 90 tablet 1    metFORMIN (GLUCOPHAGE) 500 MG tablet Take 1 tablet (500 mg) by mouth 2 times daily (with meals) 180 tablet 3    traZODone (DESYREL) 50 MG tablet Take 1 tablet (50 mg) by mouth at bedtime 30 tablet 2    blood glucose (NO BRAND SPECIFIED) test strip Use to test blood sugar 1 times daily or as directed. To accompany: Blood Glucose Monitor Brands: per insurance. (Patient not taking: Reported on 2024) 100 strip 6    blood glucose monitoring (NO BRAND SPECIFIED) meter device kit Use to test blood sugar 1 times daily or as directed. Preferred blood glucose meter OR supplies to accompany: Blood Glucose Monitor Brands: per insurance. (Patient not taking: Reported on 2024) 1 kit 0    buprenorphine HCl-naloxone HCl (SUBOXONE) 8-2 MG per film PLACE 1 FILM UNDER THE TONGUE THREE TIMES DAILY (Patient not taking: Reported on 2024) 90 Film 2    cephALEXin (KEFLEX) 500 MG capsule Take 1 capsule (500 mg) by mouth 2 times daily 20 capsule 0    rosuvastatin (CRESTOR) 10 MG tablet Take 1 tablet (10 mg) by mouth daily (Patient not taking: Reported on 2024) 90 tablet 3    thin (NO BRAND SPECIFIED) lancets Use with lanceting device. To accompany: Blood Glucose Monitor Brands: per insurance. (Patient not taking: Reported  "on 8/27/2024) 100 each 6     No current facility-administered medications for this visit.       Review of Systems  Constitutional, HEENT, cardiovascular, pulmonary, GI, , musculoskeletal, neuro, skin, endocrine and psych systems are negative, except as otherwise noted.      Objective    BP (!) 153/80   Pulse 59   Temp 97.5  F (36.4  C) (Tympanic)   Resp 16   Ht 1.843 m (6' 0.56\")   Wt 125.3 kg (276 lb 3.2 oz)   SpO2 98%   BMI 36.88 kg/m    Body mass index is 36.88 kg/m .  Physical Exam   GENERAL: alert and no distress  NECK: no adenopathy, no asymmetry, masses, or scars  RESP: lungs clear to auscultation - no rales, rhonchi or wheezes  CV: regular rate and rhythm, normal S1 S2, no S3 or S4, no murmur, click or rub, + trace peripheral edema  ABDOMEN: soft, nontender, no hepatosplenomegaly, no masses and bowel sounds normal  MS: no gross musculoskeletal defects noted, + trace edema      Signed Electronically by: China Waldrop MD    "

## 2024-08-28 ENCOUNTER — PATIENT OUTREACH (OUTPATIENT)
Dept: GASTROENTEROLOGY | Facility: CLINIC | Age: 64
End: 2024-08-28
Payer: COMMERCIAL

## 2024-08-28 LAB
ANION GAP SERPL CALCULATED.3IONS-SCNC: 14 MMOL/L (ref 7–15)
BUN SERPL-MCNC: 19.7 MG/DL (ref 8–23)
CALCIUM SERPL-MCNC: 9.4 MG/DL (ref 8.8–10.4)
CHLORIDE SERPL-SCNC: 101 MMOL/L (ref 98–107)
CREAT SERPL-MCNC: 0.9 MG/DL (ref 0.67–1.17)
EGFRCR SERPLBLD CKD-EPI 2021: >90 ML/MIN/1.73M2
GLUCOSE SERPL-MCNC: 157 MG/DL (ref 70–99)
HCO3 SERPL-SCNC: 23 MMOL/L (ref 22–29)
POTASSIUM SERPL-SCNC: 4.5 MMOL/L (ref 3.4–5.3)
SODIUM SERPL-SCNC: 138 MMOL/L (ref 135–145)

## 2024-09-04 ENCOUNTER — HOSPITAL ENCOUNTER (OUTPATIENT)
Dept: CT IMAGING | Facility: CLINIC | Age: 64
Discharge: HOME | End: 2024-09-04
Attending: INTERNAL MEDICINE | Admitting: INTERNAL MEDICINE
Payer: COMMERCIAL

## 2024-09-04 DIAGNOSIS — R93.89 ABNORMAL X-RAY EXAMINATION: ICD-10-CM

## 2024-09-04 DIAGNOSIS — R07.9 LEFT-SIDED CHEST PAIN: ICD-10-CM

## 2024-09-04 DIAGNOSIS — R06.00 DYSPNEA, UNSPECIFIED TYPE: ICD-10-CM

## 2024-09-04 PROCEDURE — 250N000011 HC RX IP 250 OP 636: Performed by: INTERNAL MEDICINE

## 2024-09-04 PROCEDURE — 250N000009 HC RX 250: Performed by: INTERNAL MEDICINE

## 2024-09-04 PROCEDURE — 71260 CT THORAX DX C+: CPT

## 2024-09-04 RX ORDER — IOPAMIDOL 755 MG/ML
125 INJECTION, SOLUTION INTRAVASCULAR ONCE
Status: COMPLETED | OUTPATIENT
Start: 2024-09-04 | End: 2024-09-04

## 2024-09-04 RX ADMIN — IOPAMIDOL 125 ML: 755 INJECTION, SOLUTION INTRAVENOUS at 17:43

## 2024-09-04 RX ADMIN — SODIUM CHLORIDE 79 ML: 9 INJECTION, SOLUTION INTRAVENOUS at 18:03

## 2024-10-10 ENCOUNTER — OFFICE VISIT (OUTPATIENT)
Dept: FAMILY MEDICINE | Facility: CLINIC | Age: 64
End: 2024-10-10
Payer: COMMERCIAL

## 2024-10-10 DIAGNOSIS — E66.01 MORBID OBESITY (H): ICD-10-CM

## 2024-10-10 DIAGNOSIS — F11.20 OPIOID USE DISORDER, SEVERE, ON MAINTENANCE THERAPY (H): ICD-10-CM

## 2024-10-10 DIAGNOSIS — J98.01 BRONCHOSPASM: ICD-10-CM

## 2024-10-10 DIAGNOSIS — E11.21 TYPE 2 DIABETES MELLITUS WITH DIABETIC NEPHROPATHY, WITHOUT LONG-TERM CURRENT USE OF INSULIN (H): Primary | ICD-10-CM

## 2024-10-10 PROCEDURE — 99214 OFFICE O/P EST MOD 30 MIN: CPT | Performed by: PHYSICIAN ASSISTANT

## 2024-10-10 RX ORDER — ATORVASTATIN CALCIUM 10 MG/1
10 TABLET, FILM COATED ORAL DAILY
Qty: 90 TABLET | Refills: 0 | Status: SHIPPED | OUTPATIENT
Start: 2024-10-10

## 2024-10-10 RX ORDER — BUPRENORPHINE AND NALOXONE 8; 2 MG/1; MG/1
FILM, SOLUBLE BUCCAL; SUBLINGUAL
Qty: 90 FILM | Refills: 2 | Status: SHIPPED | OUTPATIENT
Start: 2024-10-10

## 2024-10-10 RX ORDER — ALBUTEROL SULFATE 90 UG/1
2 INHALANT RESPIRATORY (INHALATION) EVERY 6 HOURS PRN
Qty: 18 G | Refills: 1 | Status: SHIPPED | OUTPATIENT
Start: 2024-10-10

## 2024-10-10 ASSESSMENT — PAIN SCALES - GENERAL: PAINLEVEL: NO PAIN (0)

## 2024-10-10 NOTE — PROGRESS NOTES
"  Assessment & Plan     Type 2 diabetes mellitus with diabetic nephropathy, without long-term current use of insulin (H)  Will trial mounjaro.  Discussed the benefits and potential side effects of medication  - tirzepatide (MOUNJARO) 2.5 MG/0.5ML pen; Inject 2.5 mg subcutaneously every 7 days.  - atorvastatin (LIPITOR) 10 MG tablet; Take 1 tablet (10 mg) by mouth daily.    Opioid use disorder, severe, on maintenance therapy (H)  Stable, responsible use.  No issues on PDMP  - buprenorphine HCl-naloxone HCl (SUBOXONE) 8-2 MG per film; PLACE 1 FILM UNDER THE TONGUE THREE TIMES DAILY    Bronchospasm  Will trial albuterol over weekend to see if this may help some symptoms  - albuterol (PROAIR HFA/PROVENTIL HFA/VENTOLIN HFA) 108 (90 Base) MCG/ACT inhaler; Inhale 2 puffs into the lungs every 6 hours as needed for shortness of breath, wheezing or cough.    Morbid Obesity  Plan as above      BMI  Estimated body mass index is 36.46 kg/m  as calculated from the following:    Height as of 8/27/24: 1.843 m (6' 0.56\").    Weight as of this encounter: 123.8 kg (273 lb).   Weight management plan: Discussed healthy diet and exercise guidelines          Latrice Allan is a 64 year old, presenting for the following health issues:  Diabetes (Lower chest pressure with congestion)        10/10/2024    10:08 AM   Additional Questions   Roomed by darrin greenberg   Accompanied by otto         10/10/2024    10:08 AM   Patient Reported Additional Medications   Patient reports taking the following new medications n/a     History of Present Illness       Reason for visit:  Chest breathing  Symptom onset:  3-4 weeks ago   He is taking medications regularly.         Was seen last month for some left sided chest pain.  Full work up with labs and CT were reassuring.  He has improved, but still admits some tightness.  Feels like it is more left upper abdomen than in chest.  Not exertional.  Long history of smoking, does wonder about some COPD.  Does get " fair amount of gas as well, which could play a role    He has been having good control of blood sugars, but wonder about GLP, as weight/appetite continue to increase.    Continues to do well on suboxone.  Has been clean from opioids for 10+ years.          Review of Systems  Constitutional, HEENT, cardiovascular, pulmonary, gi and gu systems are negative, except as otherwise noted.      Objective    /68   Pulse 80   Temp 97.4  F (36.3  C) (Temporal)   Resp 19   Wt 123.8 kg (273 lb)   SpO2 96%   BMI 36.46 kg/m    Body mass index is 36.46 kg/m .  Physical Exam   GENERAL: alert and no distress  EYES: Eyes grossly normal to inspection  HENT: ear canals and TM's normal, nose and mouth without ulcers or lesions  NECK: no adenopathy, no asymmetry, masses, or scars, and thyroid normal to palpation  RESP: lungs clear to auscultation - no rales, rhonchi or wheezes  CV: regular rate and rhythm, normal S1 S2, no S3 or S4, no murmur, click or rub, no peripheral edema   ABDOMEN: soft, nontender, no hepatosplenomegaly, no masses and bowel sounds normal            Signed Electronically by: Cory Bond PA-C

## 2024-10-11 ENCOUNTER — TELEPHONE (OUTPATIENT)
Dept: FAMILY MEDICINE | Facility: CLINIC | Age: 64
End: 2024-10-11
Payer: COMMERCIAL

## 2024-10-11 VITALS
RESPIRATION RATE: 19 BRPM | WEIGHT: 273 LBS | SYSTOLIC BLOOD PRESSURE: 132 MMHG | BODY MASS INDEX: 36.46 KG/M2 | DIASTOLIC BLOOD PRESSURE: 68 MMHG | HEART RATE: 80 BPM | TEMPERATURE: 97.4 F | OXYGEN SATURATION: 96 %

## 2024-10-11 DIAGNOSIS — E11.21 TYPE 2 DIABETES MELLITUS WITH DIABETIC NEPHROPATHY, WITHOUT LONG-TERM CURRENT USE OF INSULIN (H): ICD-10-CM

## 2024-10-11 NOTE — TELEPHONE ENCOUNTER
Jerrell calling to report that he was seen in clinic yesterday and prescribed medication. He went to pick this up from the pharmacy and they did not have his mounjaro pen prescription.     Called pharmacy. They said that they did not receive the prescription. Prescription was ordered as a local print. T'd up new prescription with E-prescribe and routing to provider to review. Pharmacy said they do have one box available.    Called Jerrell back to let him know that we will send this to his provider to re send the prescription.     Codi Camargo RN

## 2024-10-22 ENCOUNTER — OFFICE VISIT (OUTPATIENT)
Dept: URGENT CARE | Facility: URGENT CARE | Age: 64
End: 2024-10-22
Payer: COMMERCIAL

## 2024-10-22 VITALS
RESPIRATION RATE: 18 BRPM | WEIGHT: 271 LBS | BODY MASS INDEX: 36.19 KG/M2 | OXYGEN SATURATION: 95 % | DIASTOLIC BLOOD PRESSURE: 78 MMHG | TEMPERATURE: 98.9 F | SYSTOLIC BLOOD PRESSURE: 150 MMHG | HEART RATE: 74 BPM

## 2024-10-22 DIAGNOSIS — J01.90 ACUTE SINUSITIS WITH SYMPTOMS > 10 DAYS: Primary | ICD-10-CM

## 2024-10-22 PROCEDURE — 99213 OFFICE O/P EST LOW 20 MIN: CPT | Performed by: PHYSICIAN ASSISTANT

## 2024-10-22 NOTE — PATIENT INSTRUCTIONS

## 2024-10-22 NOTE — PROGRESS NOTES
Chief Complaint   Patient presents with    Urgent Care    Sinus Problem     X 3 weeks, Sinus pressure around the eyes, HA, chest congestion and side/rib pain - using CPAP  Two negative at home Covid test       ASSESSMENT/PLAN:  Jerrell was seen today for urgent care and sinus problem.    Diagnoses and all orders for this visit:    Acute sinusitis with symptoms > 10 days  -     amoxicillin-clavulanate (AUGMENTIN) 875-125 MG tablet; Take 1 tablet by mouth 2 times daily for 7 days.    Given length of symptoms more likely be bacterial.  Start Augmentin.  Flonase, saline rinses other symptomatic management discussed    Saul Russell PA-C      SUBJECTIVE:  Henrique is a 64 year old male who presents to urgent care with 3 weeks of sinus pain, pressure and congestion.  Face feels painful and heavy when bending forward.  Had a lot of sinus infections before sinus surgery.  Has some fatigue and malaise.  Some cough.    ROS: Pertinent ROS neg other than the symptoms noted above in the HPI.     OBJECTIVE:  BP (!) 150/78 (BP Location: Right arm, Patient Position: Sitting, Cuff Size: Adult Large)   Pulse 74   Temp 98.9  F (37.2  C) (Oral)   Resp 18   Wt 122.9 kg (271 lb)   SpO2 95%   BMI 36.19 kg/m     GENERAL: alert and no distress  EYES: Eyes grossly normal to inspection, PERRL and conjunctivae and sclerae normal  HENT: ear canals and TM's normal, nose and mouth without ulcers or lesions turbinates swollen and erythematous  RESP: Intermittent rhonchi of the upper lungs otherwise clear  CV: regular rate and rhythm, normal S1 S2, no S3 or S4, no murmur, click or rub, no peripheral edema     DIAGNOSTICS    No results found for any visits on 10/22/24.     Current Outpatient Medications   Medication Sig Dispense Refill    albuterol (PROAIR HFA/PROVENTIL HFA/VENTOLIN HFA) 108 (90 Base) MCG/ACT inhaler Inhale 2 puffs into the lungs every 6 hours as needed for shortness of breath, wheezing or cough. 18 g 1    atorvastatin (LIPITOR)  10 MG tablet Take 1 tablet (10 mg) by mouth daily. 90 tablet 0    blood glucose (NO BRAND SPECIFIED) test strip Use to test blood sugar 1 times daily or as directed. To accompany: Blood Glucose Monitor Brands: per insurance. 100 strip 6    blood glucose monitoring (NO BRAND SPECIFIED) meter device kit Use to test blood sugar 1 times daily or as directed. Preferred blood glucose meter OR supplies to accompany: Blood Glucose Monitor Brands: per insurance. 1 kit 0    buprenorphine HCl-naloxone HCl (SUBOXONE) 8-2 MG per film PLACE 1 FILM UNDER THE TONGUE THREE TIMES DAILY 90 Film 2    lisinopril (ZESTRIL) 30 MG tablet Take 1 tablet (30 mg) by mouth daily. 90 tablet 1    metFORMIN (GLUCOPHAGE XR) 500 MG 24 hr tablet Take 2 tablets (1,000 mg) by mouth 2 times daily (with meals). 360 tablet 1    omeprazole (PRILOSEC) 20 MG DR capsule Take 1 capsule (20 mg) by mouth daily. 90 capsule 1    thin (NO BRAND SPECIFIED) lancets Use with lanceting device. To accompany: Blood Glucose Monitor Brands: per insurance. 100 each 6    traZODone (DESYREL) 50 MG tablet Take 1 tablet (50 mg) by mouth at bedtime 30 tablet 2    tirzepatide (MOUNJARO) 2.5 MG/0.5ML pen Inject 2.5 mg subcutaneously every 7 days. (Patient not taking: Reported on 10/22/2024) 2 mL 0    Tirzepatide 2.5 MG/0.5ML SOAJ Inject 0.5 mLs (2.5 mg) subcutaneously every 7 days. (Patient not taking: Reported on 10/22/2024) 2 mL 0     No current facility-administered medications for this visit.      Patient Active Problem List   Diagnosis    History of substance abuse (H)    Type 2 diabetes mellitus with diabetic nephropathy, without long-term current use of insulin (H)    Obstructive sleep apnea syndrome    Morbid obesity (H)    Mild opioid use disorder (H)    Hx of malignant melanoma    Mixed, or nondependent drug abuse, episodic (H)    Hereditary and idiopathic peripheral neuropathy    History of dysplastic nevus    Hx of tobacco use, presenting hazards to health     Hypersomnia with sleep apnea    Malignant melanoma of skin of trunk, except scrotum (H)    Panic disorder without agoraphobia    Radiculopathy, cervical    Tubular adenoma of colon    Abnormal involuntary movement    Adenoma of large intestine    Anxiety    Depressive disorder    Elevated hemoglobin A1c    GERD (gastroesophageal reflux disease)      Past Medical History:   Diagnosis Date    Malignant melanoma (H)      No past surgical history on file.  No family history on file.  Social History     Tobacco Use    Smoking status: Former     Current packs/day: 0.00     Types: Cigarettes     Quit date: 10/1/2021     Years since quitting: 3.0     Passive exposure: Past    Smokeless tobacco: Current     Types: Chew   Substance Use Topics    Alcohol use: Not on file              The plan of care was discussed with the patient. They understand and agree with the course of treatment prescribed. A printed summary was given including instructions and medications.  The use of Dragon/[employer] dictation services may have been used to construct the content in this note; any grammatical or spelling errors are non-intentional. Please contact the author of this note directly if you are in need of any clarification.

## 2024-11-21 ENCOUNTER — TELEPHONE (OUTPATIENT)
Dept: FAMILY MEDICINE | Facility: CLINIC | Age: 64
End: 2024-11-21
Payer: COMMERCIAL

## 2024-11-21 NOTE — TELEPHONE ENCOUNTER
Patient calling having some issues with picking up his suboxone. Pharmacy said they faxed something over to the clinic about what is needed.     Please call patient back once figured out what is needed.     Johnna Watkins RN

## 2024-11-21 NOTE — TELEPHONE ENCOUNTER
Per Roberto Bond's notes from office visit on 10/10/24, suboxone prescription written and sent to: [id number] - [place], [place] - [address] AT [facility] [address] (Ph: [phone])    Opioid use disorder, severe, on maintenance therapy (H)  Stable, responsible use.  No issues on PDMP  - buprenorphine HCl-naloxone HCl (SUBOXONE) 8-2 MG per film; PLACE 1 FILM UNDER THE TONGUE THREE TIMES DAILY    Will await fax from pharmacy with details.     Leandra BYRNE RN

## 2024-12-19 ENCOUNTER — TELEPHONE (OUTPATIENT)
Dept: DERMATOLOGY | Facility: CLINIC | Age: 64
End: 2024-12-19

## 2024-12-22 ENCOUNTER — HEALTH MAINTENANCE LETTER (OUTPATIENT)
Age: 64
End: 2024-12-22

## 2025-01-09 ENCOUNTER — OFFICE VISIT (OUTPATIENT)
Dept: DERMATOLOGY | Facility: CLINIC | Age: 65
End: 2025-01-09
Attending: NURSE PRACTITIONER
Payer: COMMERCIAL

## 2025-01-09 DIAGNOSIS — Z12.83 ENCOUNTER FOR SCREENING FOR MALIGNANT NEOPLASM OF SKIN: ICD-10-CM

## 2025-01-09 DIAGNOSIS — D22.9 MULTIPLE BENIGN NEVI: ICD-10-CM

## 2025-01-09 DIAGNOSIS — D18.01 CHERRY ANGIOMA: ICD-10-CM

## 2025-01-09 DIAGNOSIS — Z85.820 HX OF MALIGNANT MELANOMA: ICD-10-CM

## 2025-01-09 DIAGNOSIS — L30.4 INTERTRIGO: Primary | ICD-10-CM

## 2025-01-09 DIAGNOSIS — L82.1 SEBORRHEIC KERATOSES: ICD-10-CM

## 2025-01-09 DIAGNOSIS — L81.4 LENTIGINES: ICD-10-CM

## 2025-01-09 RX ORDER — HYDROCORTISONE 25 MG/G
CREAM TOPICAL 2 TIMES DAILY
Qty: 30 G | Refills: 2 | Status: SHIPPED | OUTPATIENT
Start: 2025-01-09

## 2025-01-09 RX ORDER — KETOCONAZOLE 20 MG/G
CREAM TOPICAL
Qty: 60 G | Refills: 11 | Status: SHIPPED | OUTPATIENT
Start: 2025-01-09

## 2025-01-09 NOTE — PROGRESS NOTES
[facility] Dermatology Note  Encounter Date: Jan 9, 2025  Office Visit     Reviewed patients past medical history and pertinent chart review prior to patients visit today.     Dermatology Problem List:  History of melanoma:   -Malignant melanoma, Right mid back, s/p wide local excision and sentinel lymph node biopsy   (negative), 01/2013. 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 Onychomadesis of toenail    3. History of dysplastic nevus    - right posterior shoulder, excised 01/2013   - left upper abdomen, moderately dysplastic, 2013   - left upper back, moderate to severely dysplastic, 2014  Intertrigo of the groin folds, given ketoconazole 2% cream and hydrocortisone 2.5% cream4.  1/9/2025  ____________________________________________    Assessment & Plan:     # history of melanoma and dysplastic nevi. Well healed scar without signs of recurrent malignancy or pigmentation. No lymphadenopathy.      # Intertrigo. Discussed that this is very common in the skin folds and is due to the areas being warm and moist.  Discussed techniques to ensure area stays as dry as possible but that this is not always possible.  I gave patient prescription for ketoconazole 2% cream as well as hydrocortisone 2.5% cream to apply 1-2 times daily until rash resolves. When rash is fully resolved okay to continue ketoconazole as much as needed to keep rash from flaring as it will tend to be recurrent for most people. Councled about use and side effects of thinning of the skin, striae, erythema, and worsening of rash with steroid use.        # Benign skin findings including: seborrheic keratoses, cherry angioma, lentigines and benign nevi.   - No further intervention required. Patient to report changes.   - Patient reassured of the benign nature of these lesions.    #Signs and Symptoms of non-melanoma skin cancer and ABCDEs of melanoma  reviewed with patient. Patient encouraged to perform monthly self skin exams and educated on how to perform them. UV precautions reviewed with patient. Patient was asked about new or changing moles/lesions on body.     #Reviewed Sunscreen: Apply 20 minutes prior to going outdoors and reapply every two hours, when wet or sweating. We recommend using an SPF 30 or higher, and to use one that is water resistant.       Follow-up: 6 months for follow up full body skin exam, prn for new or changing lesions or new concerns    Mayela Lemos, LILIANA  Dermatology     ____________________________________________    CC: Skin Check ([facility] [place], no concerns)    HPI:  Mr. Henrique Nathan is a(n) 64 year old male who presents today as a new patient for a full body skin cancer screening.  He has had a red rash in the groin folds on and off for a while now and wonders if there is anything he can be doing for it.  Sometimes it extends down his leg a bit but often times is just in the folds.  Sometimes it is more wet appearing and other times it is just red and irritated.  Today it is pretty good.      He has a history of malanoma x2 and DN.     Patient denies any fever, weight loss, swollen lymph nodes, loss of appetite or energy, and has been feeling well.      Physical Exam:  Vitals: There were no vitals taken for this visit.  SKIN: Total skin excluding the genitalia areas was performed. The exam included the head/face, neck, both arms, chest, back, abdomen, both legs, digits, mons pubis, buttock and nails.   -mild erythema of inguinal folds, no maceration  -left clavicle, Well healed scar without signs of recurrent malignancy.   -right mid back, well healed scar without repigmentation   -no axillary, cervical, supraclavicular, preauricular, popliteal, inguinal, epitrochlear lymphadenopathy.    -several 1-2mm red dome shaped symmetric papules scattered on the trunk  -multiple tan/brown flat round macules and raised papules scattered throughout  trunk, extremities and head. No worrisome features for malignancy noted on examination.  -scattered tan, homogenous macules scattered on sun exposed areas of trunk, extremities and face.   -scattered waxy, stuck on tan/brown papules and patches on the trunk     - No other lesions of concern on areas examined.     Medications:  Current Outpatient Medications   Medication Sig Dispense Refill    albuterol (PROAIR HFA/PROVENTIL HFA/VENTOLIN HFA) 108 (90 Base) MCG/ACT inhaler Inhale 2 puffs into the lungs every 6 hours as needed for shortness of breath, wheezing or cough. 18 g 1    atorvastatin (LIPITOR) 10 MG tablet Take 1 tablet (10 mg) by mouth daily. 90 tablet 0    blood glucose (NO BRAND SPECIFIED) test strip Use to test blood sugar 1 times daily or as directed. To accompany: Blood Glucose Monitor Brands: per insurance. 100 strip 6    blood glucose monitoring (NO BRAND SPECIFIED) meter device kit Use to test blood sugar 1 times daily or as directed. Preferred blood glucose meter OR supplies to accompany: Blood Glucose Monitor Brands: per insurance. 1 kit 0    buprenorphine HCl-naloxone HCl (SUBOXONE) 8-2 MG per film PLACE 1 FILM UNDER THE TONGUE THREE TIMES DAILY 90 Film 2    lisinopril (ZESTRIL) 30 MG tablet Take 1 tablet (30 mg) by mouth daily. 90 tablet 1    metFORMIN (GLUCOPHAGE XR) 500 MG 24 hr tablet Take 2 tablets (1,000 mg) by mouth 2 times daily (with meals). 360 tablet 1    omeprazole (PRILOSEC) 20 MG DR capsule Take 1 capsule (20 mg) by mouth daily. 90 capsule 1    thin (NO BRAND SPECIFIED) lancets Use with lanceting device. To accompany: Blood Glucose Monitor Brands: per insurance. 100 each 6    tirzepatide (MOUNJARO) 2.5 MG/0.5ML pen Inject 2.5 mg subcutaneously every 7 days. 2 mL 0    Tirzepatide 2.5 MG/0.5ML SOAJ Inject 0.5 mLs (2.5 mg) subcutaneously every 7 days. 2 mL 0    traZODone (DESYREL) 50 MG tablet Take 1 tablet (50 mg) by mouth at bedtime 30 tablet 2     No current facility-administered  medications for this visit.      Past Medical History:   Patient Active Problem List   Diagnosis    History of substance abuse (H)    Type 2 diabetes mellitus with diabetic nephropathy, without long-term current use of insulin (H)    Obstructive sleep apnea syndrome    Morbid obesity (H)    Mild opioid use disorder (H)    Hx of malignant melanoma    Mixed, or nondependent drug abuse, episodic (H)    Hereditary and idiopathic peripheral neuropathy    History of dysplastic nevus    Hx of tobacco use, presenting hazards to health    Hypersomnia with sleep apnea    Malignant melanoma of skin of trunk, except scrotum (H)    Panic disorder without agoraphobia    Radiculopathy, cervical    Tubular adenoma of colon    Abnormal involuntary movement    Adenoma of large intestine    Anxiety    Depressive disorder    Elevated hemoglobin A1c    GERD (gastroesophageal reflux disease)     Past Medical History:   Diagnosis Date    Malignant melanoma (H)        CC Cory Bond PA-C  [address] [id number] on close of this encounter.   TWI III, AVF, V6, no stemi

## 2025-01-09 NOTE — LETTER
1/9/2025      Henrique Nathan  [address] [facility] [id number]      Dear Colleague,    Thank you for referring your patient, Henrique Nathan, to the [facility]. Please see a copy of my visit note below.    [facility] Dermatology Note  Encounter Date: Jan 9, 2025  Office Visit     Reviewed patients past medical history and pertinent chart review prior to patients visit today.     Dermatology Problem List:  History of melanoma:   -Malignant melanoma, Right mid back, s/p wide local excision and sentinel lymph node biopsy   (negative), 01/2013. 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 Onychomadesis of toenail    3. History of dysplastic nevus    - right posterior shoulder, excised 01/2013   - left upper abdomen, moderately dysplastic, 2013   - left upper back, moderate to severely dysplastic, 2014  Intertrigo of the groin folds, given ketoconazole 2% cream and hydrocortisone 2.5% cream4.  1/9/2025  ____________________________________________    Assessment & Plan:     # history of melanoma and dysplastic nevi. Well healed scar without signs of recurrent malignancy or pigmentation. No lymphadenopathy.      # Intertrigo. Discussed that this is very common in the skin folds and is due to the areas being warm and moist.  Discussed techniques to ensure area stays as dry as possible but that this is not always possible.  I gave patient prescription for ketoconazole 2% cream as well as hydrocortisone 2.5% cream to apply 1-2 times daily until rash resolves. When rash is fully resolved okay to continue ketoconazole as much as needed to keep rash from flaring as it will tend to be recurrent for most people. Councled about use and side effects of thinning of the skin, striae, erythema, and worsening of rash with steroid use.        # Benign skin findings including: seborrheic keratoses,  cherry angioma, lentigines and benign nevi.   - No further intervention required. Patient to report changes.   - Patient reassured of the benign nature of these lesions.    #Signs and Symptoms of non-melanoma skin cancer and ABCDEs of melanoma reviewed with patient. Patient encouraged to perform monthly self skin exams and educated on how to perform them. UV precautions reviewed with patient. Patient was asked about new or changing moles/lesions on body.     #Reviewed Sunscreen: Apply 20 minutes prior to going outdoors and reapply every two hours, when wet or sweating. We recommend using an SPF 30 or higher, and to use one that is water resistant.       Follow-up: 6 months for follow up full body skin exam, prn for new or changing lesions or new concerns    Mayela Lemos, LILIANA  Dermatology     ____________________________________________    CC: Skin Check ([facility]SC, no concerns)    HPI:  Mr. Henrique Nathan is a(n) 64 year old male who presents today as a new patient for a full body skin cancer screening.  He has had a red rash in the groin folds on and off for a while now and wonders if there is anything he can be doing for it.  Sometimes it extends down his leg a bit but often times is just in the folds.  Sometimes it is more wet appearing and other times it is just red and irritated.  Today it is pretty good.      He has a history of malanoma x2 and DN.     Patient denies any fever, weight loss, swollen lymph nodes, loss of appetite or energy, and has been feeling well.      Physical Exam:  Vitals: There were no vitals taken for this visit.  SKIN: Total skin excluding the genitalia areas was performed. The exam included the head/face, neck, both arms, chest, back, abdomen, both legs, digits, mons pubis, buttock and nails.   -mild erythema of inguinal folds, no maceration  -left clavicle, Well healed scar without signs of recurrent malignancy.   -right mid back, well healed scar without repigmentation   -no axillary,  cervical, supraclavicular, preauricular, popliteal, inguinal, epitrochlear lymphadenopathy.    -several 1-2mm red dome shaped symmetric papules scattered on the trunk  -multiple tan/brown flat round macules and raised papules scattered throughout trunk, extremities and head. No worrisome features for malignancy noted on examination.  -scattered tan, homogenous macules scattered on sun exposed areas of trunk, extremities and face.   -scattered waxy, stuck on tan/brown papules and patches on the trunk     - No other lesions of concern on areas examined.     Medications:  Current Outpatient Medications   Medication Sig Dispense Refill     albuterol (PROAIR HFA/PROVENTIL HFA/VENTOLIN HFA) 108 (90 Base) MCG/ACT inhaler Inhale 2 puffs into the lungs every 6 hours as needed for shortness of breath, wheezing or cough. 18 g 1     atorvastatin (LIPITOR) 10 MG tablet Take 1 tablet (10 mg) by mouth daily. 90 tablet 0     blood glucose (NO BRAND SPECIFIED) test strip Use to test blood sugar 1 times daily or as directed. To accompany: Blood Glucose Monitor Brands: per insurance. 100 strip 6     blood glucose monitoring (NO BRAND SPECIFIED) meter device kit Use to test blood sugar 1 times daily or as directed. Preferred blood glucose meter OR supplies to accompany: Blood Glucose Monitor Brands: per insurance. 1 kit 0     buprenorphine HCl-naloxone HCl (SUBOXONE) 8-2 MG per film PLACE 1 FILM UNDER THE TONGUE THREE TIMES DAILY 90 Film 2     lisinopril (ZESTRIL) 30 MG tablet Take 1 tablet (30 mg) by mouth daily. 90 tablet 1     metFORMIN (GLUCOPHAGE XR) 500 MG 24 hr tablet Take 2 tablets (1,000 mg) by mouth 2 times daily (with meals). 360 tablet 1     omeprazole (PRILOSEC) 20 MG DR capsule Take 1 capsule (20 mg) by mouth daily. 90 capsule 1     thin (NO BRAND SPECIFIED) lancets Use with lanceting device. To accompany: Blood Glucose Monitor Brands: per insurance. 100 each 6     tirzepatide (MOUNJARO) 2.5 MG/0.5ML pen Inject 2.5 mg  subcutaneously every 7 days. 2 mL 0     Tirzepatide 2.5 MG/0.5ML SOAJ Inject 0.5 mLs (2.5 mg) subcutaneously every 7 days. 2 mL 0     traZODone (DESYREL) 50 MG tablet Take 1 tablet (50 mg) by mouth at bedtime 30 tablet 2     No current facility-administered medications for this visit.      Past Medical History:   Patient Active Problem List   Diagnosis     History of substance abuse (H)     Type 2 diabetes mellitus with diabetic nephropathy, without long-term current use of insulin (H)     Obstructive sleep apnea syndrome     Morbid obesity (H)     Mild opioid use disorder (H)     Hx of malignant melanoma     Mixed, or nondependent drug abuse, episodic (H)     Hereditary and idiopathic peripheral neuropathy     History of dysplastic nevus     Hx of tobacco use, presenting hazards to health     Hypersomnia with sleep apnea     Malignant melanoma of skin of trunk, except scrotum (H)     Panic disorder without agoraphobia     Radiculopathy, cervical     Tubular adenoma of colon     Abnormal involuntary movement     Adenoma of large intestine     Anxiety     Depressive disorder     Elevated hemoglobin A1c     GERD (gastroesophageal reflux disease)     Past Medical History:   Diagnosis Date     Malignant melanoma (H)        CC Cory Bond PA-C  [address] [facility] [address]  [place] [address] on close of this encounter.      Again, thank you for allowing me to participate in the care of your patient.        Sincerely,        TA Cassidy CNP    Electronically signed

## 2025-01-09 NOTE — PATIENT INSTRUCTIONS
Intertrigo    You have been prescribed two creams. Ketoconazole is the anti-yeast cream that you can use to treat and to prevent the rash. Hydrocortisone 2.5% cream is a topical steroid cream and is used only when you have an active rash.     Please use the ketoconazole and hydrocortisone cream both once or twice daily when you have a rash. Once the rash is resolved then stop using the hydrocortisone cream. Continue to use the ketoconazole every day or okay to decrease to a couple times weekly to prevent the rash from recurring.     Try to keep the area as dry as possible. Pat dry and make sure the skin is dry after bathing before applying the creams. Some people like to use a hair dryer on a cold setting to dry the area before applying their cream.          Proper skin care from [facility]:    -Eliminate harsh soaps as they strip the natural oils from the skin, often resulting in dry itchy skin ( i.e. Dial, Zest, Maria D Spring)  -Use mild soaps such as Cetaphil or Dove Sensitive Skin in the shower. You do not need to use soap on arms, legs, and trunk every time you shower unless visibly soiled.   -Avoid hot or cold showers.  -After showering, lightly dry off and apply moisturizing within 2-3 minutes. This will help trap moisture in the skin.   -Aggressive use of a moisturizer at least 1-2 times a day to the entire body (including -Vanicream, Cetaphil, Aquaphor or Cerave) and moisturize hands after every washing.  -We recommend using moisturizers that come in a tub that needs to be scooped out, not a pump. This has more of an oil base. It will hold moisture in your skin much better than a water base moisturizer. The above recommended are non-pore clogging.      Wear a sunscreen with at least SPF 30 on your face, ears, neck and V of the chest daily. Wear sunscreen on other areas of the body if those areas are exposed to the sun throughout the day. Sunscreens can contain physical and/or chemical blockers.  Physical blockers are less likely to clog pores, these include zinc oxide and titanium dioxide. Reapply every two hour and after swimming.     Sunscreen examples: https://www.ewg.org/sunscreen/    UV radiation  UVA radiation remains constant throughout the day and throughout the year. It is a longer wavelength than UVB and therefore penetrates deeper into the skin leading to immediate and delayed tanning, photoaging, and skin cancer. 70-80% of UVA and UVB radiation occurs between the hours of 10am-2pm.  UVB radiation  UVB radiation causes the most harmful effects and is more significant during the summer months. However, snow and ice can reflect UVB radiation leading to skin damage during the winter months as well. UVB radiation is responsible for tanning, burning, inflammation, delayed erythema (pinkness), pigmentation (brown spots), and skin cancer.     I recommend self monthly full body exams and yearly full body exams with a dermatology provider. If you develop a new or changing lesion please follow up for examination. Most skin cancers are pink and scaly or pink and pearly. However, we do see blue/brown/black skin cancers.  Consider the ABCDEs of melanoma when giving yourself your monthly full body exam ( don't forget the groin, buttocks, feet, toes, etc). A-asymmetry, B-borders, C-color, D-diameter, E-elevation or evolving. If you see any of these changes please follow up in clinic. If you cannot see your back I recommend purchasing a hand held mirror to use with a larger wall mirror.       Checking for Skin Cancer  You can find cancer early by checking your skin each month. There are 3 kinds of skin cancer. They are melanoma, basal cell carcinoma, and squamous cell carcinoma. Doing monthly skin checks is the best way to find new marks or skin changes. Follow the instructions below for checking your skin.   The ABCDEs of checking moles for melanoma   Check your moles or growths for signs of melanoma using  ABCDE:   Asymmetry: the sides of the mole or growth don t match  Border: the edges are ragged, notched, or blurred  Color: the color within the mole or growth varies  Diameter: the mole or growth is larger than 6 mm (size of a pencil eraser)  Evolving: the size, shape, or color of the mole or growth is changing (evolving is not shown in the images below)    Checking for other types of skin cancer  Basal cell carcinoma or squamous cell carcinoma have symptoms such as:     A spot or mole that looks different from all other marks on your skin  Changes in how an area feels, such as itching, tenderness, or pain  Changes in the skin's surface, such as oozing, bleeding, or scaliness  A sore that does not heal  New swelling or redness beyond the border of a mole    Who s at risk?  Anyone can get skin cancer. But you are at greater risk if you have:   Fair skin, light-colored hair, or light-colored eyes  Many moles or abnormal moles on your skin  A history of sunburns from sunlight or tanning beds  A family history of skin cancer  A history of exposure to radiation or chemicals  A weakened immune system  If you have had skin cancer in the past, you are at risk for recurring skin cancer.   How to check your skin  Do your monthly skin checkups in front of a full-length mirror. Check all parts of your body, including your:   Head (ears, face, neck, and scalp)  Torso (front, back, and sides)  Arms (tops, undersides, upper, and lower armpits)  Hands (palms, backs, and fingers, including under the nails)  Buttocks and genitals  Legs (front, back, and sides)  Feet (tops, soles, toes, including under the nails, and between toes)  If you have a lot of moles, take digital photos of them each month. Make sure to take photos both up close and from a distance. These can help you see if any moles change over time.   Most skin changes are not cancer. But if you see any changes in your skin, call your doctor right away. Only he or she can  diagnose a problem. If you have skin cancer, seeing your doctor can be the first step toward getting the treatment that could save your life.   [employer] last reviewed this educational content on 4/1/2019 2000-2020 [employer]. [address] [place] [address], [place] [address]. All rights reserved. This information is not intended as a substitute for professional medical care. Always follow your healthcare professional's instructions.       When should I call my doctor?  If you are worsening or not improving, please, contact us or seek urgent care as noted below.     Who should I call with questions (adults)?    [facility] [phone]  For urgent needs outside of business hours call the [facility] at [phone] and ask for the dermatology resident on call to be paged  If this is a medical emergency and you are unable to reach an ER, Call [phone]      If you need a prescription refill, please contact your pharmacy. Refills are approved or denied by our Physicians during normal business hours, Monday through Fridays  Per office policy, refills will not be granted if you have not been seen within the past year (or sooner depending on your child's condition)

## 2025-01-28 ENCOUNTER — APPOINTMENT (OUTPATIENT)
Dept: CT IMAGING | Facility: CLINIC | Age: 65
End: 2025-01-28
Attending: EMERGENCY MEDICINE
Payer: COMMERCIAL

## 2025-01-28 ENCOUNTER — HOSPITAL ENCOUNTER (OUTPATIENT)
Facility: CLINIC | Age: 65
Setting detail: OBSERVATION
Discharge: HOME | End: 2025-01-29
Attending: EMERGENCY MEDICINE | Admitting: STUDENT IN AN ORGANIZED HEALTH CARE EDUCATION/TRAINING PROGRAM
Payer: COMMERCIAL

## 2025-01-28 DIAGNOSIS — I48.91 ATRIAL FIBRILLATION WITH RAPID VENTRICULAR RESPONSE (H): ICD-10-CM

## 2025-01-28 DIAGNOSIS — R53.1 GENERALIZED WEAKNESS: ICD-10-CM

## 2025-01-28 PROBLEM — G47.33 OBSTRUCTIVE SLEEP APNEA SYNDROME: Status: ACTIVE | Noted: 2023-03-24

## 2025-01-28 PROBLEM — F11.10 MILD OPIOID USE DISORDER (H): Status: ACTIVE | Noted: 2023-03-24

## 2025-01-28 PROBLEM — E11.21 TYPE 2 DIABETES MELLITUS WITH DIABETIC NEPHROPATHY, WITHOUT LONG-TERM CURRENT USE OF INSULIN (H): Status: ACTIVE | Noted: 2023-03-24

## 2025-01-28 LAB
ALBUMIN SERPL BCG-MCNC: 4.7 G/DL (ref 3.5–5.2)
ALP SERPL-CCNC: 63 U/L (ref 40–150)
ALT SERPL W P-5'-P-CCNC: 37 U/L (ref 0–70)
ANION GAP SERPL CALCULATED.3IONS-SCNC: 15 MMOL/L (ref 7–15)
AST SERPL W P-5'-P-CCNC: 32 U/L (ref 0–45)
BASOPHILS # BLD AUTO: 0.1 10E3/UL (ref 0–0.2)
BASOPHILS NFR BLD AUTO: 1 %
BILIRUB SERPL-MCNC: 0.7 MG/DL
BUN SERPL-MCNC: 16.8 MG/DL (ref 8–23)
CALCIUM SERPL-MCNC: 9.7 MG/DL (ref 8.8–10.4)
CHLORIDE SERPL-SCNC: 99 MMOL/L (ref 98–107)
CREAT SERPL-MCNC: 0.97 MG/DL (ref 0.67–1.17)
D DIMER PPP FEU-MCNC: 0.38 UG/ML FEU (ref 0–0.5)
EGFRCR SERPLBLD CKD-EPI 2021: 87 ML/MIN/1.73M2
EOSINOPHIL # BLD AUTO: 0 10E3/UL (ref 0–0.7)
EOSINOPHIL NFR BLD AUTO: 0 %
ERYTHROCYTE [DISTWIDTH] IN BLOOD BY AUTOMATED COUNT: 12.9 % (ref 10–15)
GLUCOSE SERPL-MCNC: 155 MG/DL (ref 70–99)
HCO3 SERPL-SCNC: 24 MMOL/L (ref 22–29)
HCT VFR BLD AUTO: 45.4 % (ref 40–53)
HGB BLD-MCNC: 15.9 G/DL (ref 13.3–17.7)
HOLD SPECIMEN: NORMAL
IMM GRANULOCYTES # BLD: 0 10E3/UL
IMM GRANULOCYTES NFR BLD: 0 %
LYMPHOCYTES # BLD AUTO: 2.2 10E3/UL (ref 0.8–5.3)
LYMPHOCYTES NFR BLD AUTO: 18 %
MCH RBC QN AUTO: 27.4 PG (ref 26.5–33)
MCHC RBC AUTO-ENTMCNC: 35 G/DL (ref 31.5–36.5)
MCV RBC AUTO: 78 FL (ref 78–100)
MONOCYTES # BLD AUTO: 0.9 10E3/UL (ref 0–1.3)
MONOCYTES NFR BLD AUTO: 8 %
NEUTROPHILS # BLD AUTO: 9.3 10E3/UL (ref 1.6–8.3)
NEUTROPHILS NFR BLD AUTO: 74 %
NRBC # BLD AUTO: 0 10E3/UL
NRBC BLD AUTO-RTO: 0 /100
PLATELET # BLD AUTO: 287 10E3/UL (ref 150–450)
POTASSIUM SERPL-SCNC: 4.3 MMOL/L (ref 3.4–5.3)
PROT SERPL-MCNC: 8.6 G/DL (ref 6.4–8.3)
RBC # BLD AUTO: 5.8 10E6/UL (ref 4.4–5.9)
SODIUM SERPL-SCNC: 138 MMOL/L (ref 135–145)
TROPONIN T SERPL HS-MCNC: 10 NG/L
TROPONIN T SERPL HS-MCNC: 8 NG/L
TSH SERPL DL<=0.005 MIU/L-ACNC: 0.52 UIU/ML (ref 0.3–4.2)
WBC # BLD AUTO: 12.6 10E3/UL (ref 4–11)

## 2025-01-28 PROCEDURE — 84484 ASSAY OF TROPONIN QUANT: CPT | Performed by: EMERGENCY MEDICINE

## 2025-01-28 PROCEDURE — 85041 AUTOMATED RBC COUNT: CPT | Performed by: EMERGENCY MEDICINE

## 2025-01-28 PROCEDURE — 99291 CRITICAL CARE FIRST HOUR: CPT | Mod: 25

## 2025-01-28 PROCEDURE — 85018 HEMOGLOBIN: CPT | Performed by: EMERGENCY MEDICINE

## 2025-01-28 PROCEDURE — 99222 1ST HOSP IP/OBS MODERATE 55: CPT | Mod: AI | Performed by: STUDENT IN AN ORGANIZED HEALTH CARE EDUCATION/TRAINING PROGRAM

## 2025-01-28 PROCEDURE — 36415 COLL VENOUS BLD VENIPUNCTURE: CPT | Performed by: EMERGENCY MEDICINE

## 2025-01-28 PROCEDURE — 84443 ASSAY THYROID STIM HORMONE: CPT | Performed by: STUDENT IN AN ORGANIZED HEALTH CARE EDUCATION/TRAINING PROGRAM

## 2025-01-28 PROCEDURE — 250N000011 HC RX IP 250 OP 636: Performed by: EMERGENCY MEDICINE

## 2025-01-28 PROCEDURE — 96374 THER/PROPH/DIAG INJ IV PUSH: CPT

## 2025-01-28 PROCEDURE — 85379 FIBRIN DEGRADATION QUANT: CPT | Performed by: EMERGENCY MEDICINE

## 2025-01-28 PROCEDURE — 70450 CT HEAD/BRAIN W/O DYE: CPT

## 2025-01-28 PROCEDURE — G0378 HOSPITAL OBSERVATION PER HR: HCPCS

## 2025-01-28 PROCEDURE — 93005 ELECTROCARDIOGRAM TRACING: CPT

## 2025-01-28 PROCEDURE — 85004 AUTOMATED DIFF WBC COUNT: CPT | Performed by: EMERGENCY MEDICINE

## 2025-01-28 PROCEDURE — 80053 COMPREHEN METABOLIC PANEL: CPT | Performed by: EMERGENCY MEDICINE

## 2025-01-28 PROCEDURE — 85025 COMPLETE CBC W/AUTO DIFF WBC: CPT | Performed by: EMERGENCY MEDICINE

## 2025-01-28 PROCEDURE — 250N000013 HC RX MED GY IP 250 OP 250 PS 637: Performed by: STUDENT IN AN ORGANIZED HEALTH CARE EDUCATION/TRAINING PROGRAM

## 2025-01-28 RX ORDER — AMOXICILLIN 250 MG
2 CAPSULE ORAL 2 TIMES DAILY PRN
Status: DISCONTINUED | OUTPATIENT
Start: 2025-01-28 | End: 2025-01-29 | Stop reason: HOSPADM

## 2025-01-28 RX ORDER — TRAZODONE HYDROCHLORIDE 50 MG/1
50 TABLET, FILM COATED ORAL AT BEDTIME
Status: DISCONTINUED | OUTPATIENT
Start: 2025-01-28 | End: 2025-01-29 | Stop reason: HOSPADM

## 2025-01-28 RX ORDER — LIDOCAINE 40 MG/G
CREAM TOPICAL
Status: DISCONTINUED | OUTPATIENT
Start: 2025-01-28 | End: 2025-01-29 | Stop reason: HOSPADM

## 2025-01-28 RX ORDER — AMOXICILLIN 250 MG
1 CAPSULE ORAL 2 TIMES DAILY PRN
Status: DISCONTINUED | OUTPATIENT
Start: 2025-01-28 | End: 2025-01-29 | Stop reason: HOSPADM

## 2025-01-28 RX ORDER — PROCHLORPERAZINE MALEATE 5 MG/1
5 TABLET ORAL EVERY 6 HOURS PRN
Status: DISCONTINUED | OUTPATIENT
Start: 2025-01-28 | End: 2025-01-29 | Stop reason: HOSPADM

## 2025-01-28 RX ORDER — BUPRENORPHINE AND NALOXONE 8; 2 MG/1; MG/1
1 FILM, SOLUBLE BUCCAL; SUBLINGUAL 3 TIMES DAILY
Status: DISCONTINUED | OUTPATIENT
Start: 2025-01-28 | End: 2025-01-29 | Stop reason: HOSPADM

## 2025-01-28 RX ORDER — ONDANSETRON 2 MG/ML
4 INJECTION INTRAMUSCULAR; INTRAVENOUS EVERY 6 HOURS PRN
Status: DISCONTINUED | OUTPATIENT
Start: 2025-01-28 | End: 2025-01-29 | Stop reason: HOSPADM

## 2025-01-28 RX ORDER — ONDANSETRON 4 MG/1
4 TABLET, ORALLY DISINTEGRATING ORAL EVERY 6 HOURS PRN
Status: DISCONTINUED | OUTPATIENT
Start: 2025-01-28 | End: 2025-01-29 | Stop reason: HOSPADM

## 2025-01-28 RX ORDER — METOPROLOL TARTRATE 25 MG/1
25 TABLET, FILM COATED ORAL 2 TIMES DAILY
Status: DISCONTINUED | OUTPATIENT
Start: 2025-01-28 | End: 2025-01-29 | Stop reason: HOSPADM

## 2025-01-28 RX ORDER — DILTIAZEM HYDROCHLORIDE 5 MG/ML
25 INJECTION INTRAVENOUS ONCE
Status: COMPLETED | OUTPATIENT
Start: 2025-01-28 | End: 2025-01-28

## 2025-01-28 RX ORDER — LISINOPRIL 10 MG/1
30 TABLET ORAL DAILY
Status: DISCONTINUED | OUTPATIENT
Start: 2025-01-29 | End: 2025-01-29 | Stop reason: HOSPADM

## 2025-01-28 RX ADMIN — METOPROLOL TARTRATE 25 MG: 25 TABLET, FILM COATED ORAL at 20:57

## 2025-01-28 RX ADMIN — DILTIAZEM HYDROCHLORIDE 25 MG: 5 INJECTION INTRAVENOUS at 19:42

## 2025-01-28 RX ADMIN — APIXABAN 5 MG: 5 TABLET, FILM COATED ORAL at 22:10

## 2025-01-28 ASSESSMENT — ACTIVITIES OF DAILY LIVING (ADL)
ADLS_ACUITY_SCORE: 41

## 2025-01-28 ASSESSMENT — COLUMBIA-SUICIDE SEVERITY RATING SCALE - C-SSRS
1. IN THE PAST MONTH, HAVE YOU WISHED YOU WERE DEAD OR WISHED YOU COULD GO TO SLEEP AND NOT WAKE UP?: NO
2. HAVE YOU ACTUALLY HAD ANY THOUGHTS OF KILLING YOURSELF IN THE PAST MONTH?: NO
6. HAVE YOU EVER DONE ANYTHING, STARTED TO DO ANYTHING, OR PREPARED TO DO ANYTHING TO END YOUR LIFE?: NO

## 2025-01-28 NOTE — ED TRIAGE NOTES
Pt presents with anterior left chest pressure that started about 3 hours ago. Pt endorses lightheadedness. HR afib RVR, in 150s bpm.     Pt also states he has left face and left leg numbness that started 4-5 hours PTA.     Pt denies hx of a fib or blood thinners. Pt does take BP meds.

## 2025-01-29 ENCOUNTER — APPOINTMENT (OUTPATIENT)
Dept: CARDIOLOGY | Facility: CLINIC | Age: 65
End: 2025-01-29
Attending: STUDENT IN AN ORGANIZED HEALTH CARE EDUCATION/TRAINING PROGRAM
Payer: COMMERCIAL

## 2025-01-29 ENCOUNTER — HOSPITAL ENCOUNTER (OUTPATIENT)
Facility: CLINIC | Age: 65
End: 2025-01-29
Payer: COMMERCIAL

## 2025-01-29 ENCOUNTER — ORDERS ONLY (AUTO-RELEASED) (OUTPATIENT)
Dept: EMERGENCY MEDICINE | Facility: CLINIC | Age: 65
End: 2025-01-29

## 2025-01-29 VITALS
WEIGHT: 265 LBS | OXYGEN SATURATION: 94 % | DIASTOLIC BLOOD PRESSURE: 81 MMHG | RESPIRATION RATE: 11 BRPM | TEMPERATURE: 98.1 F | HEART RATE: 69 BPM | HEIGHT: 73 IN | BODY MASS INDEX: 35.12 KG/M2 | SYSTOLIC BLOOD PRESSURE: 110 MMHG

## 2025-01-29 DIAGNOSIS — I48.91 ATRIAL FIBRILLATION WITH RAPID VENTRICULAR RESPONSE (H): ICD-10-CM

## 2025-01-29 LAB
ANION GAP SERPL CALCULATED.3IONS-SCNC: 8 MMOL/L (ref 7–15)
ATRIAL RATE - MUSE: 55 BPM
ATRIAL RATE - MUSE: NORMAL BPM
ATRIAL RATE - MUSE: NORMAL BPM
BUN SERPL-MCNC: 18 MG/DL (ref 8–23)
CALCIUM SERPL-MCNC: 9.1 MG/DL (ref 8.8–10.4)
CHLORIDE SERPL-SCNC: 100 MMOL/L (ref 98–107)
CREAT SERPL-MCNC: 0.98 MG/DL (ref 0.67–1.17)
DIASTOLIC BLOOD PRESSURE - MUSE: NORMAL MMHG
EGFRCR SERPLBLD CKD-EPI 2021: 86 ML/MIN/1.73M2
ERYTHROCYTE [DISTWIDTH] IN BLOOD BY AUTOMATED COUNT: 13.1 % (ref 10–15)
GLUCOSE SERPL-MCNC: 147 MG/DL (ref 70–99)
HCO3 SERPL-SCNC: 27 MMOL/L (ref 22–29)
HCT VFR BLD AUTO: 44.7 % (ref 40–53)
HGB BLD-MCNC: 15.2 G/DL (ref 13.3–17.7)
INTERPRETATION ECG - MUSE: NORMAL
LVEF ECHO: NORMAL
MAGNESIUM SERPL-MCNC: 2.3 MG/DL (ref 1.7–2.3)
MCH RBC QN AUTO: 27 PG (ref 26.5–33)
MCHC RBC AUTO-ENTMCNC: 34 G/DL (ref 31.5–36.5)
MCV RBC AUTO: 79 FL (ref 78–100)
P AXIS - MUSE: 56 DEGREES
P AXIS - MUSE: NORMAL DEGREES
P AXIS - MUSE: NORMAL DEGREES
PLATELET # BLD AUTO: 268 10E3/UL (ref 150–450)
POTASSIUM SERPL-SCNC: 4.3 MMOL/L (ref 3.4–5.3)
PR INTERVAL - MUSE: 188 MS
PR INTERVAL - MUSE: NORMAL MS
PR INTERVAL - MUSE: NORMAL MS
QRS DURATION - MUSE: 90 MS
QRS DURATION - MUSE: 98 MS
QRS DURATION - MUSE: 98 MS
QT - MUSE: 286 MS
QT - MUSE: 340 MS
QT - MUSE: 426 MS
QTC - MUSE: 407 MS
QTC - MUSE: 418 MS
QTC - MUSE: 444 MS
R AXIS - MUSE: 14 DEGREES
R AXIS - MUSE: 67 DEGREES
R AXIS - MUSE: 85 DEGREES
RBC # BLD AUTO: 5.64 10E6/UL (ref 4.4–5.9)
SODIUM SERPL-SCNC: 135 MMOL/L (ref 135–145)
SYSTOLIC BLOOD PRESSURE - MUSE: NORMAL MMHG
T AXIS - MUSE: 16 DEGREES
T AXIS - MUSE: 20 DEGREES
T AXIS - MUSE: 23 DEGREES
VENTRICULAR RATE- MUSE: 145 BPM
VENTRICULAR RATE- MUSE: 55 BPM
VENTRICULAR RATE- MUSE: 91 BPM
WBC # BLD AUTO: 8.9 10E3/UL (ref 4–11)

## 2025-01-29 PROCEDURE — 999N000208 ECHOCARDIOGRAM COMPLETE

## 2025-01-29 PROCEDURE — 99239 HOSP IP/OBS DSCHRG MGMT >30: CPT

## 2025-01-29 PROCEDURE — 250N000013 HC RX MED GY IP 250 OP 250 PS 637: Performed by: STUDENT IN AN ORGANIZED HEALTH CARE EDUCATION/TRAINING PROGRAM

## 2025-01-29 PROCEDURE — 82565 ASSAY OF CREATININE: CPT | Performed by: STUDENT IN AN ORGANIZED HEALTH CARE EDUCATION/TRAINING PROGRAM

## 2025-01-29 PROCEDURE — G0378 HOSPITAL OBSERVATION PER HR: HCPCS

## 2025-01-29 PROCEDURE — 83735 ASSAY OF MAGNESIUM: CPT | Performed by: INTERNAL MEDICINE

## 2025-01-29 PROCEDURE — 255N000002 HC RX 255 OP 636: Performed by: STUDENT IN AN ORGANIZED HEALTH CARE EDUCATION/TRAINING PROGRAM

## 2025-01-29 PROCEDURE — 36415 COLL VENOUS BLD VENIPUNCTURE: CPT | Performed by: STUDENT IN AN ORGANIZED HEALTH CARE EDUCATION/TRAINING PROGRAM

## 2025-01-29 PROCEDURE — 93306 TTE W/DOPPLER COMPLETE: CPT | Mod: 26 | Performed by: INTERNAL MEDICINE

## 2025-01-29 PROCEDURE — 250N000012 HC RX MED GY IP 250 OP 636 PS 637: Performed by: STUDENT IN AN ORGANIZED HEALTH CARE EDUCATION/TRAINING PROGRAM

## 2025-01-29 PROCEDURE — 99223 1ST HOSP IP/OBS HIGH 75: CPT | Mod: 25 | Performed by: INTERNAL MEDICINE

## 2025-01-29 PROCEDURE — 85014 HEMATOCRIT: CPT | Performed by: STUDENT IN AN ORGANIZED HEALTH CARE EDUCATION/TRAINING PROGRAM

## 2025-01-29 PROCEDURE — 82310 ASSAY OF CALCIUM: CPT | Performed by: STUDENT IN AN ORGANIZED HEALTH CARE EDUCATION/TRAINING PROGRAM

## 2025-01-29 PROCEDURE — 93005 ELECTROCARDIOGRAM TRACING: CPT

## 2025-01-29 PROCEDURE — C8929 TTE W OR WO FOL WCON,DOPPLER: HCPCS

## 2025-01-29 PROCEDURE — 80048 BASIC METABOLIC PNL TOTAL CA: CPT | Performed by: STUDENT IN AN ORGANIZED HEALTH CARE EDUCATION/TRAINING PROGRAM

## 2025-01-29 RX ORDER — POTASSIUM CHLORIDE 1500 MG/1
40 TABLET, EXTENDED RELEASE ORAL
OUTPATIENT
Start: 2025-01-29

## 2025-01-29 RX ORDER — METOPROLOL TARTRATE 25 MG/1
25 TABLET, FILM COATED ORAL DAILY
Qty: 30 TABLET | Refills: 1 | Status: SHIPPED | OUTPATIENT
Start: 2025-01-30 | End: 2025-01-29

## 2025-01-29 RX ORDER — POTASSIUM CHLORIDE 1500 MG/1
20 TABLET, EXTENDED RELEASE ORAL
OUTPATIENT
Start: 2025-01-29

## 2025-01-29 RX ORDER — MAGNESIUM SULFATE HEPTAHYDRATE 40 MG/ML
2 INJECTION, SOLUTION INTRAVENOUS
OUTPATIENT
Start: 2025-01-29

## 2025-01-29 RX ORDER — METOPROLOL SUCCINATE 25 MG/1
25 TABLET, EXTENDED RELEASE ORAL DAILY
Qty: 30 TABLET | Refills: 1 | Status: SHIPPED | OUTPATIENT
Start: 2025-01-29

## 2025-01-29 RX ORDER — LIDOCAINE 40 MG/G
CREAM TOPICAL
OUTPATIENT
Start: 2025-01-29

## 2025-01-29 RX ADMIN — PERFLUTREN 10 ML: 6.52 INJECTION, SUSPENSION INTRAVENOUS at 11:45

## 2025-01-29 RX ADMIN — APIXABAN 5 MG: 5 TABLET, FILM COATED ORAL at 07:33

## 2025-01-29 RX ADMIN — BUPRENORPHINE AND NALOXONE 1 FILM: 8; 2 FILM, SOLUBLE BUCCAL; SUBLINGUAL at 11:56

## 2025-01-29 ASSESSMENT — ACTIVITIES OF DAILY LIVING (ADL)
ADLS_ACUITY_SCORE: 41

## 2025-01-29 NOTE — ED NOTES
Patient up walking around the room, stated he was uncomfy and staff reported that he can walk around or sit in a chair if he would like. New set of vitals were taken

## 2025-01-29 NOTE — CONSULTS
[facility]    Cardiology Consultation     Date of Admission:  1/28/2025    Assessment & Plan   Henrique Nathan is a 64 year old male who was admitted on 1/28/2025.    1.  New onset, symptomatic atrial fibrillation  2.  Essential hypertension  3.  Diabetes  4.  Obstructive sleep apnea  5.  GERD.      Recommendations:  1.  Agree with metoprolol and AC due to high CHADS2 Vasc score .  2.  Patient has multiple risk factors for atrial fibrillation such as obesity, obstructive sleep apnea, hypertension.  Advised aggressive management of these.  3.  Will perform ARCENIO cardioversion tomorrow for new, symptomatic atrial fibrillation.  At the time of discharge will prescribe 2 weeks Zio patch.  4.  Will cancel transthoracic echocardiogram as we are ordering ARCENIO.    High complexity     Charlie Rucker MD, MD    Primary Care Physician   Cory Bond    Reason for Consult   Reason for consult: I was asked to evaluate this patient for atrial fibrillation.    History of Present Illness   Henrique Nathan is a 64 year old male with past medical history of diabetes, obesity, hypertension who presented to the hospital for lightheadedness and was noted to be in new atrial fibrillation.  Brain imaging was negative.  He has been started on anticoagulation, metoprolol.  Echocardiogram is pending.    The patient tells me that he for the last 1 month he has not been feeling well.  He has nonspecific symptoms such as weakness and tingling in bilateral upper extremities, occasionally gets pain in the chest and also feeling numbness and weakness in the left lower extremity.  Yesterday prior to presentation he had episodes of feeling fatigued for no apparent reason.  For little while he also noticed that his heart was racing racing which she has not felt in the past.    Past Medical History   Past Medical History:   Diagnosis Date    Malignant melanoma (H)          Past Surgical History   No past surgical  history on file.      Prior to Admission Medications   Prior to Admission Medications   Prescriptions Last Dose Informant Patient Reported? Taking?   Tirzepatide 2.5 MG/0.5ML SOAJ  Self No No   Sig: Inject 0.5 mLs (2.5 mg) subcutaneously every 7 days.   albuterol (PROAIR HFA/PROVENTIL HFA/VENTOLIN HFA) 108 (90 Base) MCG/ACT inhaler  Self No No   Sig: Inhale 2 puffs into the lungs every 6 hours as needed for shortness of breath, wheezing or cough.   blood glucose (NO BRAND SPECIFIED) test strip   No No   Sig: Use to test blood sugar 1 times daily or as directed. To accompany: Blood Glucose Monitor Brands: per insurance.   blood glucose monitoring (NO BRAND SPECIFIED) meter device kit   No No   Sig: Use to test blood sugar 1 times daily or as directed. Preferred blood glucose meter OR supplies to accompany: Blood Glucose Monitor Brands: per insurance.   buprenorphine HCl-naloxone HCl (SUBOXONE) 8-2 MG per film 1/28/2025 Morning Self No Yes   Sig: PLACE 1 FILM UNDER THE TONGUE THREE TIMES DAILY   hydrocortisone 2.5 % cream  Self No Yes   Sig: Apply topically 2 times daily. When rash is flared only then stop   Patient taking differently: Apply topically 2 times daily as needed. When rash is flared only then stop   ketoconazole (NIZORAL) 2 % external cream  Self No Yes   Sig: Apply daily to skin folds where rash is prone   Patient taking differently: Apply topically daily as needed. Apply daily to skin folds where rash is prone   lisinopril (ZESTRIL) 30 MG tablet 1/28/2025 Morning Self No Yes   Sig: Take 1 tablet (30 mg) by mouth daily.   metFORMIN (GLUCOPHAGE XR) 500 MG 24 hr tablet 1/28/2025 Morning Self No Yes   Sig: Take 2 tablets (1,000 mg) by mouth 2 times daily (with meals).   Patient taking differently: Take 1,000 mg by mouth every morning.   thin (NO BRAND SPECIFIED) lancets   No No   Sig: Use with lanceting device. To accompany: Blood Glucose Monitor Brands: per insurance.   tirzepatide (MOUNJARO) 2.5 MG/0.5ML  pen  Self No No   Sig: Inject 2.5 mg subcutaneously every 7 days.   traZODone (DESYREL) 50 MG tablet 1/27/2025 Bedtime Self No Yes   Sig: Take 1 tablet (50 mg) by mouth at bedtime      Facility-Administered Medications: None     Current Facility-Administered Medications   Medication Dose Route Frequency Provider Last Rate Last Admin    apixaban ANTICOAGULANT (ELIQUIS) tablet 5 mg  5 mg Oral BID Akash Brooks MD   5 mg at 01/29/25 0733    buprenorphine HCl-naloxone HCl (SUBOXONE) 8-2 MG per film 1 Film  1 Film Sublingual TID Akash Brooks MD        lidocaine (LMX4) cream   Topical Q1H PRN Akash Brooks MD        lidocaine 1 % 0.1-1 mL  0.1-1 mL Other Q1H PRN Akash Brooks MD        lisinopril (ZESTRIL) tablet 30 mg  30 mg Oral Daily Akash Brooks MD        metoprolol tartrate (LOPRESSOR) tablet 25 mg  25 mg Oral BID Akash Brooks MD   25 mg at 01/28/25 2057    ondansetron (ZOFRAN ODT) ODT tab 4 mg  4 mg Oral Q6H PRN Akash Brooks MD        Or    ondansetron (ZOFRAN) injection 4 mg  4 mg Intravenous Q6H PRN Akash Brooks MD        prochlorperazine (COMPAZINE) injection 5 mg  5 mg Intravenous Q6H PRN Akash Brooks MD        Or    prochlorperazine (COMPAZINE) tablet 5 mg  5 mg Oral Q6H PRN Akash Brooks MD        senna-docusate (SENOKOT-S/PERICOLACE) 8.6-50 MG per tablet 1 tablet  1 tablet Oral BID PRN Akash Brooks MD        Or    senna-docusate (SENOKOT-S/PERICOLACE) 8.6-50 MG per tablet 2 tablet  2 tablet Oral BID PRN Akash Brooks MD        sodium chloride (PF) 0.9% PF flush 3 mL  3 mL Intracatheter q1 min prn Akash Brooks MD        sodium chloride (PF) 0.9% PF flush 3 mL  3 mL Intracatheter Q8H Akash Brooks MD   3 mL at 01/29/25 0735    traZODone (DESYREL) tablet 50 mg  50 mg Oral At Bedtime Akash Brooks MD         Current Outpatient Medications   Medication Sig Dispense Refill    buprenorphine HCl-naloxone HCl (SUBOXONE) 8-2 MG per film PLACE 1 FILM UNDER THE TONGUE THREE TIMES DAILY 90 Film 2    hydrocortisone 2.5 % cream  Apply topically 2 times daily. When rash is flared only then stop (Patient taking differently: Apply topically 2 times daily as needed. When rash is flared only then stop) 30 g 2    ketoconazole (NIZORAL) 2 % external cream Apply daily to skin folds where rash is prone (Patient taking differently: Apply topically daily as needed. Apply daily to skin folds where rash is prone) 60 g 11    lisinopril (ZESTRIL) 30 MG tablet Take 1 tablet (30 mg) by mouth daily. 90 tablet 1    metFORMIN (GLUCOPHAGE XR) 500 MG 24 hr tablet Take 2 tablets (1,000 mg) by mouth 2 times daily (with meals). (Patient taking differently: Take 1,000 mg by mouth every morning.) 360 tablet 1    traZODone (DESYREL) 50 MG tablet Take 1 tablet (50 mg) by mouth at bedtime 30 tablet 2    albuterol (PROAIR HFA/PROVENTIL HFA/VENTOLIN HFA) 108 (90 Base) MCG/ACT inhaler Inhale 2 puffs into the lungs every 6 hours as needed for shortness of breath, wheezing or cough. 18 g 1    blood glucose (NO BRAND SPECIFIED) test strip Use to test blood sugar 1 times daily or as directed. To accompany: Blood Glucose Monitor Brands: per insurance. 100 strip 6    blood glucose monitoring (NO BRAND SPECIFIED) meter device kit Use to test blood sugar 1 times daily or as directed. Preferred blood glucose meter OR supplies to accompany: Blood Glucose Monitor Brands: per insurance. 1 kit 0    thin (NO BRAND SPECIFIED) lancets Use with lanceting device. To accompany: Blood Glucose Monitor Brands: per insurance. 100 each 6    tirzepatide (MOUNJARO) 2.5 MG/0.5ML pen Inject 2.5 mg subcutaneously every 7 days. 2 mL 0    Tirzepatide 2.5 MG/0.5ML SOAJ Inject 0.5 mLs (2.5 mg) subcutaneously every 7 days. 2 mL 0     Current Facility-Administered Medications   Medication Dose Route Frequency Provider Last Rate Last Admin    apixaban ANTICOAGULANT (ELIQUIS) tablet 5 mg  5 mg Oral BID Akash Brooks MD   5 mg at 01/29/25 0733    buprenorphine HCl-naloxone HCl (SUBOXONE) 8-2 MG per film 1 Film   1 Film Sublingual TID Akash Brooks MD        lidocaine (LMX4) cream   Topical Q1H PRN Akash Brooks MD        lidocaine 1 % 0.1-1 mL  0.1-1 mL Other Q1H PRN Akash Brooks MD        lisinopril (ZESTRIL) tablet 30 mg  30 mg Oral Daily Akash Brooks MD        metoprolol tartrate (LOPRESSOR) tablet 25 mg  25 mg Oral BID Akash Brooks MD   25 mg at 01/28/25 2057    ondansetron (ZOFRAN ODT) ODT tab 4 mg  4 mg Oral Q6H PRN Akash Brooks MD        Or    ondansetron (ZOFRAN) injection 4 mg  4 mg Intravenous Q6H PRN Akash Brooks MD        prochlorperazine (COMPAZINE) injection 5 mg  5 mg Intravenous Q6H PRN Akash Brooks MD        Or    prochlorperazine (COMPAZINE) tablet 5 mg  5 mg Oral Q6H PRN Akash Brooks MD        senna-docusate (SENOKOT-S/PERICOLACE) 8.6-50 MG per tablet 1 tablet  1 tablet Oral BID PRN Akash Brooks MD        Or    senna-docusate (SENOKOT-S/PERICOLACE) 8.6-50 MG per tablet 2 tablet  2 tablet Oral BID PRN Akash Brooks MD        sodium chloride (PF) 0.9% PF flush 3 mL  3 mL Intracatheter q1 min prn Akash Brooks MD        sodium chloride (PF) 0.9% PF flush 3 mL  3 mL Intracatheter Q8H Akash Brooks MD   3 mL at 01/29/25 0735    traZODone (DESYREL) tablet 50 mg  50 mg Oral At Bedtime Akash Brooks MD         Current Outpatient Medications   Medication Sig Dispense Refill    buprenorphine HCl-naloxone HCl (SUBOXONE) 8-2 MG per film PLACE 1 FILM UNDER THE TONGUE THREE TIMES DAILY 90 Film 2    hydrocortisone 2.5 % cream Apply topically 2 times daily. When rash is flared only then stop (Patient taking differently: Apply topically 2 times daily as needed. When rash is flared only then stop) 30 g 2    ketoconazole (NIZORAL) 2 % external cream Apply daily to skin folds where rash is prone (Patient taking differently: Apply topically daily as needed. Apply daily to skin folds where rash is prone) 60 g 11    lisinopril (ZESTRIL) 30 MG tablet Take 1 tablet (30 mg) by mouth daily. 90 tablet 1    metFORMIN (GLUCOPHAGE XR) 500 MG  24 hr tablet Take 2 tablets (1,000 mg) by mouth 2 times daily (with meals). (Patient taking differently: Take 1,000 mg by mouth every morning.) 360 tablet 1    traZODone (DESYREL) 50 MG tablet Take 1 tablet (50 mg) by mouth at bedtime 30 tablet 2    albuterol (PROAIR HFA/PROVENTIL HFA/VENTOLIN HFA) 108 (90 Base) MCG/ACT inhaler Inhale 2 puffs into the lungs every 6 hours as needed for shortness of breath, wheezing or cough. 18 g 1    blood glucose (NO BRAND SPECIFIED) test strip Use to test blood sugar 1 times daily or as directed. To accompany: Blood Glucose Monitor Brands: per insurance. 100 strip 6    blood glucose monitoring (NO BRAND SPECIFIED) meter device kit Use to test blood sugar 1 times daily or as directed. Preferred blood glucose meter OR supplies to accompany: Blood Glucose Monitor Brands: per insurance. 1 kit 0    thin (NO BRAND SPECIFIED) lancets Use with lanceting device. To accompany: Blood Glucose Monitor Brands: per insurance. 100 each 6    tirzepatide (MOUNJARO) 2.5 MG/0.5ML pen Inject 2.5 mg subcutaneously every 7 days. 2 mL 0    Tirzepatide 2.5 MG/0.5ML SOAJ Inject 0.5 mLs (2.5 mg) subcutaneously every 7 days. 2 mL 0     Allergies   No Known Allergies    Social History    reports that he quit smoking about 3 years ago. His smoking use included cigarettes. He has been exposed to tobacco smoke. His smokeless tobacco use includes chew.      Family History   I have reviewed this patient's family history and updated it with pertinent information if needed.  Family History   Problem Relation Age of Onset    No Known Problems Mother     No Known Problems Father           Review of Systems   A comprehensive review of system was performed and is negative other than that noted in the HPI or here.     Physical Exam   Vital Signs with Ranges  Temp:  [98  F (36.7  C)-98.1  F (36.7  C)] 98.1  F (36.7  C)  Pulse:  [] 65  Resp:  [11-22] 11  BP: ()/() 97/79  SpO2:  [94 %-97 %] 97 %  Wt  "Readings from Last 4 Encounters:   01/29/25 120.2 kg (265 lb)   10/22/24 122.9 kg (271 lb)   10/10/24 123.8 kg (273 lb)   08/27/24 125.3 kg (276 lb 3.2 oz)     No intake/output data recorded.      Vitals: BP 97/79   Pulse 65   Temp 98.1  F (36.7  C)   Resp 11   Ht 1.854 m (6' 1\")   Wt 120.2 kg (265 lb)   SpO2 97%   BMI 34.96 kg/m      Physical Exam:   General - Alert and oriented to time place and person in no acute distress  Eyes - No scleral icterus  HEENT - Neck supple, moist mucous membranes  Cardiovascular -S1-S2 normal, no murmurs  Extremities - There is no edema  Respiratory -CTAB  Skin - No pallor or cyanosis  Gastrointestinal - Non tender and non distended without rebound or guarding  Psych - Appropriate affect   Neurological - No gross motor neurological focal deficits    No lab results found in last 7 days.    Invalid input(s): \"TROPONINIES\"    Recent Labs   Lab 01/29/25  0604 01/28/25  1816   WBC 8.9 12.6*   HGB 15.2 15.9   MCV 79 78    287    138   POTASSIUM 4.3 4.3   CHLORIDE 100 99   CO2 27 24   BUN 18.0 16.8   CR 0.98 0.97   GFRESTIMATED 86 87   ANIONGAP 8 15   EVER 9.1 9.7   * 155*   ALBUMIN  --  4.7   PROTTOTAL  --  8.6*   BILITOTAL  --  0.7   ALKPHOS  --  63   ALT  --  37   AST  --  32     Recent Labs   Lab Test 04/18/24  0914 03/20/23  1102   CHOL 175 154   HDL 57 53   * 88   TRIG 55 66     Recent Labs   Lab 01/29/25  0604 01/28/25  1816   WBC 8.9 12.6*   HGB 15.2 15.9   HCT 44.7 45.4   MCV 79 78    287     No results for input(s): \"PH\", \"PHV\", \"PO2\", \"PO2V\", \"SAT\", \"PCO2\", \"PCO2V\", \"HCO3\", \"HCO3V\" in the last 168 hours.  No results for input(s): \"NTBNPI\", \"NTBNP\" in the last 168 hours.  Recent Labs   Lab 01/28/25  1816   DD 0.38     No results for input(s): \"SED\", \"CRP\" in the last 168 hours.  Recent Labs   Lab 01/29/25  0604 01/28/25  1816    287     Recent Labs   Lab 01/28/25  1816   TSH 0.52     No results for input(s): \"COLOR\", \"APPEARANCE\", " "\"URINEGLC\", \"URINEBILI\", \"URINEKETONE\", \"SG\", \"UBLD\", \"URINEPH\", \"PROTEIN\", \"UROBILINOGEN\", \"NITRITE\", \"LEUKEST\", \"RBCU\", \"WBCU\" in the last 168 hours.    Imaging:  Recent Results (from the past 48 hours)   Head CT w/o contrast    Narrative    EXAM: CT HEAD W/O CONTRAST  LOCATION: [facility]  DATE: 1/28/2025    INDICATION: left sided numbness  COMPARISON: None.  TECHNIQUE: Routine CT Head without IV contrast. Multiplanar reformats. Dose reduction techniques were used.    FINDINGS:  INTRACRANIAL CONTENTS: No intracranial hemorrhage, extraaxial collection, or mass effect.  No CT evidence of acute infarct. Normal parenchymal attenuation. Normal ventricles and sulci.     VISUALIZED ORBITS/SINUSES/MASTOIDS: No intraorbital abnormality. Left maxillary sinus mucosal thickening and air-fluid level. No middle ear or mastoid effusion.    BONES/SOFT TISSUES: No acute abnormality.      Impression    IMPRESSION:  1.  No acute intracranial process.  2.  Nonspecific left maxillary air-fluid level, can be seen with acute sinusitis in the appropriate clinical setting.       Echo:  No results found for this or any previous visit (from the past 4320 hours).    Clinically Significant Risk Factors Present on Admission                   # Hypertension: Home medication list includes antihypertensive(s)          # DMII: A1C = N/A within past 6 months    # Obesity: Estimated body mass index is 34.96 kg/m  as calculated from the following:    Height as of this encounter: 1.854 m (6' 1\").    Weight as of this encounter: 120.2 kg (265 lb).             Cardiac Arrhythmia: Atrial fibrillation: Paroxysmal          ThThank you I put in theDo I need to talk to somebody I put the order in likeJust a heads of the EKG 5 davonte nurse for  "

## 2025-01-29 NOTE — ED PROVIDER NOTES
Emergency Department Note      History of Present Illness     Chief Complaint   Irregular Heart Beat and Numbness      HPI   Henrique Nathan is a 64 year old male presents to us because of worsening feelings of weakness.  The patient has been concerned about progressive facial pressure, intermittent numbness to his body, fatigue, shortness of breath while at work over the past several months.  He believes that symptoms have continued to progress, significantly worsening over the past week or so.  Today that while at work, he stated that he simply felt so profoundly weak that he could not use his arms.  He also describes ongoing symptoms of having tingling in his face, left arm, and legs.  Because of this worsening today, he called his wife and came to the emergency department for assessment.  He denies chest pain.  He denies noting palpitations or rapid heart rate.    Independent Historian   Wife as detailed above.    Review of External Notes   Yes-I reviewed his visit to urgent care in October for issues of sinusitis.  I also reviewed his visit to his primary doctor in August 2024 where he underwent a CT of the chest and workup for chest pain.    Past Medical History     Medical History and Problem List   Past Medical History:   Diagnosis Date    Malignant melanoma (H)        Medications   albuterol (PROAIR HFA/PROVENTIL HFA/VENTOLIN HFA) 108 (90 Base) MCG/ACT inhaler  atorvastatin (LIPITOR) 10 MG tablet  blood glucose (NO BRAND SPECIFIED) test strip  blood glucose monitoring (NO BRAND SPECIFIED) meter device kit  buprenorphine HCl-naloxone HCl (SUBOXONE) 8-2 MG per film  hydrocortisone 2.5 % cream  ketoconazole (NIZORAL) 2 % external cream  lisinopril (ZESTRIL) 30 MG tablet  metFORMIN (GLUCOPHAGE XR) 500 MG 24 hr tablet  omeprazole (PRILOSEC) 20 MG DR capsule  thin (NO BRAND SPECIFIED) lancets  tirzepatide (MOUNJARO) 2.5 MG/0.5ML pen  Tirzepatide 2.5 MG/0.5ML SOAJ  traZODone (DESYREL) 50 MG  tablet        Surgical History   No past surgical history on file.    Physical Exam     Patient Vitals for the past 24 hrs:   BP Temp Temp src Pulse Resp SpO2   01/28/25 1830 -- -- -- (!) 158 22 94 %   01/28/25 1810 (!) 152/129 -- -- (!) 146 16 96 %   01/28/25 1755 (!) 147/102 98.1  F (36.7  C) Oral (!) 158 20 96 %     Physical Exam  Eye:  Pupils are equal, round, and reactive.  Extraocular movements intact.    ENT:  No rhinorrhea.  Moist mucus membranes.  Normal tongue and tonsil.    Cardiac: Irregularly irregular and markedly tachycardic.  No murmurs, gallops, or rubs.    Pulmonary:  Clear to auscultation bilaterally.  No wheezes, rales, or rhonchi.    Abdomen:  Positive bowel sounds.  Abdomen is soft and non-distended, without focal tenderness.    Musculoskeletal:  Normal movement of all extremities without evidence for deficit.    Skin:  Warm and dry without rashes.    Neurologic:  CN II - XII intact.  5/5 strength in all extremities.  Normal sensation throughout.  Normal finger to nose and heel to shin.  2+ patellar reflexes.  Normal gait.    Psychiatric: Anxious affect with appropriate interaction with examiner.      Diagnostics     Lab Results   Labs Ordered and Resulted from Time of ED Arrival to Time of ED Departure   COMPREHENSIVE METABOLIC PANEL - Abnormal       Result Value    Sodium 138      Potassium 4.3      Carbon Dioxide (CO2) 24      Anion Gap 15      Urea Nitrogen 16.8      Creatinine 0.97      GFR Estimate 87      Calcium 9.7      Chloride 99      Glucose 155 (*)     Alkaline Phosphatase 63      AST 32      ALT 37      Protein Total 8.6 (*)     Albumin 4.7      Bilirubin Total 0.7     CBC WITH PLATELETS AND DIFFERENTIAL - Abnormal    WBC Count 12.6 (*)     RBC Count 5.80      Hemoglobin 15.9      Hematocrit 45.4      MCV 78      MCH 27.4      MCHC 35.0      RDW 12.9      Platelet Count 287      % Neutrophils 74      % Lymphocytes 18      % Monocytes 8      % Eosinophils 0      % Basophils 1       % Immature Granulocytes 0      NRBCs per 100 WBC 0      Absolute Neutrophils 9.3 (*)     Absolute Lymphocytes 2.2      Absolute Monocytes 0.9      Absolute Eosinophils 0.0      Absolute Basophils 0.1      Absolute Immature Granulocytes 0.0      Absolute NRBCs 0.0     BASIC METABOLIC PANEL - Abnormal    Sodium 135      Potassium 4.3      Chloride 100      Carbon Dioxide (CO2) 27      Anion Gap 8      Urea Nitrogen 18.0      Creatinine 0.98      GFR Estimate 86      Calcium 9.1      Glucose 147 (*)    TROPONIN T, HIGH SENSITIVITY - Normal    Troponin T, High Sensitivity 10     D DIMER QUANTITATIVE - Normal    D-Dimer Quantitative 0.38     TROPONIN T, HIGH SENSITIVITY - Normal    Troponin T, High Sensitivity 8     TSH WITH FREE T4 REFLEX - Normal    TSH 0.52     CBC WITH PLATELETS - Normal    WBC Count 8.9      RBC Count 5.64      Hemoglobin 15.2      Hematocrit 44.7      MCV 79      MCH 27.0      MCHC 34.0      RDW 13.1      Platelet Count 268         Imaging   Head CT w/o contrast   Final Result   IMPRESSION:   1.  No acute intracranial process.   2.  Nonspecific left maxillary air-fluid level, can be seen with acute sinusitis in the appropriate clinical setting.      Echocardiogram Complete    (Results Pending)   Cardioversion    (Results Pending)   Transesophageal Echocardiogram    (Results Pending)       EKG   ECG taken at 1752, ECG read at 1820  ECG results from 01/28/25   EKG 12-lead, tracing only     Value    Systolic Blood Pressure     Diastolic Blood Pressure     Ventricular Rate 145    Atrial Rate     KS Interval     QRS Duration 90        QTc 444    P Axis     R AXIS 14    T Axis 23    Interpretation ECG      ** Critical Test Result: High HR  Atrial fibrillation with rapid ventricular response  Nonspecific ST abnormality  Abnormal ECG  No previous ECGs available  Confirmed by GENERATED REPORT, COMPUTER ([id number]),  Maggie Robb ([id number]) on 1/29/2025 9:34:49 AM     EKG 12-lead, tracing only      Value    Systolic Blood Pressure     Diastolic Blood Pressure     Ventricular Rate 91    Atrial Rate     VT Interval     QRS Duration 98        QTc 418    P Axis     R AXIS 85    T Axis 16    Interpretation ECG      Atrial fibrillation  Abnormal ECG  When compared with ECG of 28-Jan-2025 17:52,  Vent. rate has decreased by  54 bpm  Questionable change in QRS axis  Confirmed by GENERATED REPORT, COMPUTER ([id number]),  Maggie Robb ([id number]) on 1/29/2025 1:15:31 PM         Independent Interpretation   CT Head: No intracranial hemorrhage.    ED Course      Medications Administered   Medications - No data to display    Procedures   Procedures     Discussion of Management   Admitting Hospitalist, Akash Brooks MD    ED Course        Additional Documentation  None    Medical Decision Making / Diagnosis     CMS Diagnoses: None    MIPS       None    MDM   Henrique Nathan is a 64 year old male presenting to us with progressive weakness and intermittent spells of numbness.  I find him to be in atrial fibrillation with rapid ventricular response.  He has no significant chest pain, shortness of breath, or the sensation of palpitations.  Symptoms are more intense today, though are similar to what they have been over the past week.  Therefore, I cannot definitively determine when he went into this malignant rhythm and I do not feel that he can be safely cardioverted at this time.  Therefore, a rate control approach was pursued with a dose of diltiazem with significant improvement in his heart rate.  I considered pulmonary embolism as a cause for this, though his D-dimer is negative and he is otherwise low risk.  The remainder of his laboratory investigation is reassuring.  With his intermittent numbness and possible intermittent A-fib, I did elect to obtain a head CT.  This study does not show any evidence of chronic infarct, and without acute symptoms today, I do not believe he would require angiogram or MRI.    With his  new onset of atrial fibrillation along with other risk factors, I do feel that admission to the hospital is prudent.  I spoke with hospital medicine will admit the patient under observation status for rate control and cardiology consultation.  The patient is in support of this and questions are answered.    Disposition   The patient was admitted to the hospital.     Diagnosis     ICD-10-CM    1. Atrial fibrillation with rapid ventricular response (H)  I48.91       2. Generalized weakness  R53.1            Discharge Medications   New Prescriptions    No medications on file         Chad A. Trierweiler, MD Trierweiler, Chad A, MD  01/29/25 1327

## 2025-01-29 NOTE — PHARMACY-ADMISSION MEDICATION HISTORY
Pharmacist Admission Medication History    Admission medication history is complete. The information provided in this note is only as accurate as the sources available at the time of the update.    Information Source(s): Patient and CareEverywhere/SureScripts via in-person    Pertinent Information:   - Atorvastatin: Patient reports that he no longer takes this  - Albuterol: Ran out of, doesn't currently have inhaler at home  - Metformin: Patient states that he only takes once daily because that's how he thought he was supposed to be taking. Is currently prescribed 1g BID  - Mounjaro: Patient hasn't started yet        Changes made to PTA medication list:  Added: None  Deleted:   Atorvastatin, Omeprazole  Changed:   Metformin 1g BID --> 1g daily  Hydrocortisone and ketoconazole cream amish --> prn    Allergies reviewed with patient and updates made in EHR: yes    Medication History Completed By: Kera Garcia RPH 1/28/2025 7:48 PM    PTA Med List   Medication Sig Last Dose/Taking    buprenorphine HCl-naloxone HCl (SUBOXONE) 8-2 MG per film PLACE 1 FILM UNDER THE TONGUE THREE TIMES DAILY 1/28/2025 Morning    hydrocortisone 2.5 % cream Apply topically 2 times daily. When rash is flared only then stop (Patient taking differently: Apply topically 2 times daily as needed. When rash is flared only then stop) Taking Differently    ketoconazole (NIZORAL) 2 % external cream Apply daily to skin folds where rash is prone (Patient taking differently: Apply topically daily as needed. Apply daily to skin folds where rash is prone) Taking Differently    lisinopril (ZESTRIL) 30 MG tablet Take 1 tablet (30 mg) by mouth daily. 1/28/2025 Morning    metFORMIN (GLUCOPHAGE XR) 500 MG 24 hr tablet Take 2 tablets (1,000 mg) by mouth 2 times daily (with meals). (Patient taking differently: Take 1,000 mg by mouth every morning.) 1/28/2025 Morning    traZODone (DESYREL) 50 MG tablet Take 1 tablet (50 mg) by mouth at bedtime 1/27/2025 Bedtime

## 2025-01-29 NOTE — H&P
[facility]    History and Physical - Hospitalist Service       Date of Admission:  1/28/2025    Assessment & Plan      Henrique Nathan is a 64 year old male admitted on 1/28/2025. He presents with lightheadedness.       Atrial fibrillation with rapid ventricular response (H)      Generalized weakness    Assessment: Presents with lightheadedness on the day of admission. CT head with no acute intracranial process.  Was found to be in A-fib with RVR.  Heart rates have improved significantly with IV Cardizem x1 Patient has no prior history of A-fib.  CHADS2 Vascor of 3 for age, diabetes mellitus, hypertension    Plan:   -Registered observation  -Monitor on telemetry  -Start metoprolol 25 mg twice daily  -Echocardiogram  -Follow vitals/temp  -Pharmacy liaison for DOAC coverage -> start Eliquis  -Cardiology consulted      Type 2 diabetes mellitus with diabetic nephropathy, without long-term current use of insulin (H)    Assessment/Plan: Resume prior to admission metformin.      Obstructive sleep apnea syndrome    Assessment/Plan: Stable, order CPAP as needed      Mild opioid use disorder (H)    Mixed, or nondependent drug abuse, episodic (H)    Assessment/Plan: Continue PTA Suboxone once verified by pharmacy      Hypersomnia with sleep apnea    Panic disorder without agoraphobia    Anxiety    Depressive disorder    Assessment/Plan: Continue prior to admission trazodone at bedtime.      GERD (gastroesophageal reflux disease)    Assessment/Plan: PPI as needed            Diet:  Regular Diet  DVT Prophylaxis: Pneumatic Compression Devices  Rivera Catheter: Not present  Lines: None     Cardiac Monitoring: None  Code Status:  FULL CODE    Clinically Significant Risk Factors Present on Admission                   # Hypertension: Home medication list includes antihypertensive(s)          # DMII: A1C = N/A within past 6 months               Disposition Plan     Medically Ready for Discharge: Anticipated  Tomorrow           Akash Brooks MD  Hospitalist Service  [facility]  Securely message with [employer] (more info)  Text page via [facility] Paging/Directory     ______________________________________________________________________    Chief Complaint     Lightheadedness    History is obtained from the patient    History of Present Illness     Henrique Nathan is a 64 year old male with past medical history of type 2 diabetes mellitus, hypertension, insomnia who presents for evaluation of lightheadedness.    Patient developed onset of anterior chest pain with palpitations on the day of admission.  Symptoms lasted roughly 3 to 4 hours before he sought further evaluation in the ED.  He has no prior history of atrial fibrillation.  Has no prior history of MI/or PEs or DVTs.  Denies any recent abdominal or chest trauma.  He has had no recent fevers or chills, no cough.  Denies any nausea/vomiting or abdominal pain.  Has no prior history of strokes.  Denies any recent bloody stools, weight loss or night sweats.  He has no slurred speech, blurry vision, localized weakness or numbness of his extremities.  He is without any dysuria, hematuria or urinary urgency or frequency.  At this time he has no other complaints.    Past Medical History    Past Medical History:   Diagnosis Date    Malignant melanoma (H)        Past Surgical History   No past surgical history on file.    Prior to Admission Medications   Prior to Admission Medications   Prescriptions Last Dose Informant Patient Reported? Taking?   Tirzepatide 2.5 MG/0.5ML SOAJ  Self No No   Sig: Inject 0.5 mLs (2.5 mg) subcutaneously every 7 days.   albuterol (PROAIR HFA/PROVENTIL HFA/VENTOLIN HFA) 108 (90 Base) MCG/ACT inhaler  Self No No   Sig: Inhale 2 puffs into the lungs every 6 hours as needed for shortness of breath, wheezing or cough.   blood glucose (NO BRAND SPECIFIED) test strip   No No   Sig: Use to test blood sugar 1 times daily or as directed.  To accompany: Blood Glucose Monitor Brands: per insurance.   blood glucose monitoring (NO BRAND SPECIFIED) meter device kit   No No   Sig: Use to test blood sugar 1 times daily or as directed. Preferred blood glucose meter OR supplies to accompany: Blood Glucose Monitor Brands: per insurance.   buprenorphine HCl-naloxone HCl (SUBOXONE) 8-2 MG per film 1/28/2025 Morning Self No Yes   Sig: PLACE 1 FILM UNDER THE TONGUE THREE TIMES DAILY   hydrocortisone 2.5 % cream  Self No Yes   Sig: Apply topically 2 times daily. When rash is flared only then stop   Patient taking differently: Apply topically 2 times daily as needed. When rash is flared only then stop   ketoconazole (NIZORAL) 2 % external cream  Self No Yes   Sig: Apply daily to skin folds where rash is prone   Patient taking differently: Apply topically daily as needed. Apply daily to skin folds where rash is prone   lisinopril (ZESTRIL) 30 MG tablet 1/28/2025 Morning Self No Yes   Sig: Take 1 tablet (30 mg) by mouth daily.   metFORMIN (GLUCOPHAGE XR) 500 MG 24 hr tablet 1/28/2025 Morning Self No Yes   Sig: Take 2 tablets (1,000 mg) by mouth 2 times daily (with meals).   Patient taking differently: Take 1,000 mg by mouth every morning.   thin (NO BRAND SPECIFIED) lancets   No No   Sig: Use with lanceting device. To accompany: Blood Glucose Monitor Brands: per insurance.   tirzepatide (MOUNJARO) 2.5 MG/0.5ML pen  Self No No   Sig: Inject 2.5 mg subcutaneously every 7 days.   traZODone (DESYREL) 50 MG tablet 1/27/2025 Bedtime Self No Yes   Sig: Take 1 tablet (50 mg) by mouth at bedtime      Facility-Administered Medications: None        Review of Systems    The 10 point Review of Systems is negative other than noted in the HPI or here.     Social History   I have reviewed this patient's social history and updated it with pertinent information if needed.  Social History     Tobacco Use    Smoking status: Former     Current packs/day: 0.00     Types: Cigarettes      Quit date: 10/1/2021     Years since quitting: 3.3     Passive exposure: Past    Smokeless tobacco: Current     Types: Chew   Vaping Use    Vaping status: Never Used         Family History   I have reviewed this patient's family history and updated it with pertinent information if needed.  Family History   Problem Relation Age of Onset    No Known Problems Mother     No Known Problems Father        Allergies   No Known Allergies  ------------------------------------------------------------------------     Physical Exam   Vital Signs: Temp: 98.1  F (36.7  C) Temp src: Oral BP: 125/76 Pulse: 95   Resp: 11 SpO2: 95 % O2 Device: None (Room air)    Weight: 0 lbs 0 oz    Constitutional: awake, alert, cooperative, no apparent distress.   ENT: Normocephalic, without obvious abnormality, atraumatic,   Respiratory: CTABL   Cardiovascular: irregular rhythm and tachy with no m/r/g   GI: Normal bowel sounds, soft, non-distended, non-tender.   Skin: normal skin color, texture, turgor   Musculoskeletal: There is no redness, warmth, or swelling of the joints. Full range of motion noted.   Neurologic: Awake, alert, oriented to name, place and time. Mary. Motor is 5 out of 5 bilaterally. Sensory is intact.   Neuropsychiatric: normal mood and affect  ----------------------------------------------------------------------------------------------------------------------------------    Medical Decision Making       55 MINUTES SPENT BY ME on the date of service doing chart review, history, exam, documentation & further activities per the note.          Data   ------------------------- PAST 24 HR DATA REVIEWED -----------------------------------------------    I have personally reviewed the following data over the past 24 hrs:    12.6 (H)  \   15.9   / 287     138 99 16.8 /  155 (H)   4.3 24 0.97 \     ALT: 37 AST: 32 AP: 63 TBILI: 0.7   ALB: 4.7 TOT PROTEIN: 8.6 (H) LIPASE: N/A     Trop: 10 BNP: N/A     INR:  N/A PTT:  N/A   D-dimer:  0.38  Fibrinogen:  N/A       Imaging results reviewed over the past 24 hrs:   Recent Results (from the past 24 hours)   Head CT w/o contrast    Narrative    EXAM: CT HEAD W/O CONTRAST  LOCATION: [facility]  DATE: 1/28/2025    INDICATION: left sided numbness  COMPARISON: None.  TECHNIQUE: Routine CT Head without IV contrast. Multiplanar reformats. Dose reduction techniques were used.    FINDINGS:  INTRACRANIAL CONTENTS: No intracranial hemorrhage, extraaxial collection, or mass effect.  No CT evidence of acute infarct. Normal parenchymal attenuation. Normal ventricles and sulci.     VISUALIZED ORBITS/SINUSES/MASTOIDS: No intraorbital abnormality. Left maxillary sinus mucosal thickening and air-fluid level. No middle ear or mastoid effusion.    BONES/SOFT TISSUES: No acute abnormality.      Impression    IMPRESSION:  1.  No acute intracranial process.  2.  Nonspecific left maxillary air-fluid level, can be seen with acute sinusitis in the appropriate clinical setting.     ------------------------- ENCOUNTER LABS ----------------------------------------------------------------  Recent Labs   Lab 01/28/25 1816   WBC 12.6*   HGB 15.9   MCV 78         POTASSIUM 4.3   CHLORIDE 99   CO2 24   BUN 16.8   CR 0.97   ANIONGAP 15   EVER 9.7   *   ALBUMIN 4.7   PROTTOTAL 8.6*   BILITOTAL 0.7   ALKPHOS 63   ALT 37   AST 32       Most Recent 3 CBC's:  Recent Labs   Lab Test 01/28/25 1816 04/18/24  0914   WBC 12.6* 4.8   HGB 15.9 14.4   MCV 78 81    312     Most Recent 3 BMP's:  Recent Labs   Lab Test 01/28/25 1816 08/27/24  0828 04/18/24  0914    138 135   POTASSIUM 4.3 4.5 4.8   CHLORIDE 99 101 99   CO2 24 23 24   BUN 16.8 19.7 16.7   CR 0.97 0.90 0.93   ANIONGAP 15 14 12   EVER 9.7 9.4 9.4   * 157* 150*     Most Recent 2 LFT's:  Recent Labs   Lab Test 01/28/25  1816 04/18/24  0914   AST 32 22   ALT 37 27   ALKPHOS 63 58   BILITOTAL 0.7 0.5     Most Recent 3 INR's:No lab  results found.  Most Recent 3 Troponin's:No lab results found.  Most Recent 3 BNP's:  Recent Labs   Lab Test 08/27/24  0828   NTBNP <36     Most Recent D-dimer:  Recent Labs   Lab Test 01/28/25  1816   DD 0.38     Most Recent Cholesterol Panel:  Recent Labs   Lab Test 04/18/24  0914   CHOL 175   *   HDL 57   TRIG 55     Most Recent 6 Bacteria Isolates From Any Culture (See EPIC Reports for Culture Details):No lab results found.  Most Recent TSH and T4:No lab results found.  Most Recent Urinalysis:No lab results found.  Most Recent ESR & CRP:No lab results found.

## 2025-01-29 NOTE — ED NOTES
Reviewed recommendations with patient, verbalized understanding, will call with any further questions or complaints.    Rain Rao RN  Triage Nurse  10/17/23 12:16 EDT     [facility]  ED Nurse Handoff Report    ED Chief complaint: Irregular Heart Beat and Numbness      ED Diagnosis:   Final diagnoses:   Atrial fibrillation with rapid ventricular response (H)   Generalized weakness       Code Status: Full Code    Allergies: No Known Allergies    Patient Story: Patient has had few days to 1 week of fatigue/weakness coming and going with intermittent left sided chest pain. Seen in ER today with new Afib RVR, HR 160s-170s upon presentation. Denies history of Afib and not on blood thinners  Focused Assessment:  A/o x4, intermittent left facial, UE/LE numbness. Intermittent mild chest pain.     Treatments and/or interventions provided:   Medications   metoprolol tartrate (LOPRESSOR) tablet 25 mg (has no administration in time range)   diltiazem (CARDIZEM) injection 25 mg (25 mg Intravenous $Given 1/28/25 1942)           Patient's response to treatments and/or interventions: HR down to 80s-90s following IVP.     Pt converted to NSR. VSS    To be done/followed up on inpatient unit:  Manage meds, cards consult. Monitor overnight, if patient back in Afib RVR, there is ARCENIO with cardioversion ordered/scheduled for tomorrow.   CS RN states pt can not have metformin before ARCENIO tomorrow.    Does this patient have any cognitive concerns?:  None    Activity level - Baseline/Home:  Independent  Activity Level - Current:   Independent    Patient's Preferred language: English   Needed?: No    Isolation: None  Infection: Not Applicable  Patient tested for COVID 19 prior to admission: NO  Bariatric?: No    Vital Signs:   Vitals:    01/28/25 1810 01/28/25 1830 01/28/25 1900 01/28/25 1945   BP: (!) 152/129   94/71   Pulse: (!) 146 (!) 158 (!) 146 (!) 131   Resp: 16 22 11    Temp:       TempSrc:       SpO2: 96% 94% 96% 95%       Cardiac Rhythm:Cardiac Rhythm: (S) Atrial fibrillation (-160s. L anterior chest pressure x4 hours)    Was the PSS-3 completed:   Yes  What  interventions are required if any?               Family Comments: Wife at bedside      For the majority of the shift this patient's behavior was Green.   Behavioral interventions performed were Cares explained.    ED NURSE PHONE NUMBER: [phone]

## 2025-01-29 NOTE — CONSULTS
Patient has Medicare Advantage through [facility].    Xarelto/Eliquis  --Upon receipt of RX, Discharge Pharmacy can provide 1 mo free using one-time  voucher.  --Patient will pay 100% of the first $235 in drug costs (first month will be as much as $282)  --Subsequent fills will be $47/mo, or $94 for 3 mo at [facility].  --If/when total out of pocket drug costs exceed $2000, patient will pay $0 for all covered drugs for the remainder of the year.    Magi Pérez  Pharmacy Technician/Liaison, Discharge Pharmacy   [phone] (voice or text)  [email or web address]  Pharmacy test claims are estimates and may not reflect final costs.   Suggested alternatives aim to be cost-effective but may not be therapeutically equivalent as this consult is informational and does not constitute medical advice.   Clinical decisions should be made by qualified healthcare providers.

## 2025-01-29 NOTE — DISCHARGE SUMMARY
"[facility]  Hospitalist Discharge Summary      Date of Admission:  1/28/2025  Date of Discharge:  1/29/2025  3:55 PM  Discharging Provider: Chilango Demarco MD  Discharge Service: Hospitalist Service    Discharge Diagnoses   Atrial fibrillation with rapid ventricular response   Generalized weakness  Type 2 diabetes mellitus with diabetic nephropathy, without long-term current use of insulin   Obstructive sleep apnea syndrome  Mild opioid use disorder   Mixed, or nondependent drug abuse, episodic   Hypersomnia with sleep apnea  Panic disorder without agoraphobia  Anxiety  Depressive disorder   GERD     Clinically Significant Risk Factors     # DMII: A1C = N/A within past 6 months  # Obesity: Estimated body mass index is 34.96 kg/m  as calculated from the following:    Height as of this encounter: 1.854 m (6' 1\").    Weight as of this encounter: 120.2 kg (265 lb).       Follow-ups Needed After Discharge   Follow-up Appointments       Follow Up      Follow up with cardiology in one month        Hospital Follow-up with Existing Primary Care Provider (PCP)      Please see details below         Schedule Primary Care visit within: 30 Days             Discharge Disposition   Discharged to home  Condition at discharge: Stable    Hospital Course   Henrique Nathan is a 64 year old male with PMH T2DM, JUAN M, HLD, anxiety/depression, GERD, opioid dependence admitted on 1/28/2025. He presents with lightheadedness to have A-fib RVR but converted to sinus rhythm spontaneously.     Atrial fibrillation with rapid ventricular response (H)    Generalized weakness  Presents with lightheadedness on the day of admission. CT head with no acute intracranial process.  Was found to be in A-fib with RVR with rates in 150s, this is a new diagnosis.  Heart rates have improved significantly with IV Cardizem x1 Patient has no prior history of A-fib.  CHADS2 Vascor of 3 for age, diabetes mellitus, hypertension, started on " apixaban.  Cardiology consulted, had plan for inpatient cardioversion 1/30, however patient spontaneously converted to normal sinus rhythm.  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)  -Start metoprolol succinate 25 mg daily  -Started apixaban 5 mg twice daily  -2-week Zio patch mailed to patient  -Follow-up with EP in 1 month  -Routine follow-up of mild aortic dilatation    Type 2 diabetes mellitus with diabetic nephropathy, without long-term current use of insulin   -Resume PTA metformin     Obstructive sleep apnea syndrome-CPAP    Mild opioid use disorder (H)  Mixed, or nondependent drug abuse, episodic (H)  Continue PTA Suboxone    Hypersomnia with sleep apnea  Panic disorder without agoraphobia  Anxiety  Depressive disorder  -Continue PTA trazodone nightly    GERD - PPI as needed      Consultations This Hospital Stay   PHARMACY LIAISON FOR MEDICATION COVERAGE CONSULT  CARDIOLOGY IP CONSULT    Code Status   Full Code    Time Spent on this Encounter   I, Chilango Demarco MD, personally saw the patient today and spent greater than 30 minutes discharging this patient.       Chilango Demarco MD  [facility] EMERGENCY DEPT  [address] [place] [id number]  Phone: [phone]  Fax: [phone]  ______________________________________________________________________    Physical Exam   Vital Signs: Temp: 98.1  F (36.7  C) Temp src: Oral BP: 110/81 Pulse: 69   Resp: 11 SpO2: 94 % O2 Device: None (Room air)    Weight: 265 lbs 0 oz  Patient evaluated on day of discharge, heart rhythm spontaneously converted to sinus, patient feeling better, less weak, able to ambulate well.  Denies chest pain, shortness of breath or other acute concerns.    Constitutional: awake, alert, cooperative, NAD  HEENT: normocephalic, anicteric sclerae, MMM   Pulmonary: no increased work of breathing on room air, lungs clear to  auscultation bilaterally without wheezes or rales   Cardiovascular:  RRR, normal S1 and S2, no murmur appreciated   GI: BS+, soft, non-tender, non-distended, without guarding or rigidity  Skin: warm, dry  MSK: no peripheral edema bilaterally   Neuro: AAOx3, no gross focal neurologic deficits noted         Primary Care Physician   Cory Bond    Discharge Orders      Follow-Up with Cardiology EP      Primary Care Referral      Reason for your hospital stay    You were hospitalized for atrial fibrillation with fast heart rates     Activity    Your activity upon discharge: activity as tolerated     Follow Up    Follow up with cardiology in one month     Discharge Instructions    Seek medical care if you have recurrence of symptoms including racing heart, dizziness, weakness     Diet    Follow this diet upon discharge: Current Diet:Orders Placed This Encounter      Regular Diet Adult     Hospital Follow-up with Existing Primary Care Provider (PCP)    Please see details below          Zio Patch Mail Out       Significant Results and Procedures   Most Recent 3 CBC's:  Recent Labs   Lab Test 01/29/25  0604 01/28/25  1816 04/18/24  0914   WBC 8.9 12.6* 4.8   HGB 15.2 15.9 14.4   MCV 79 78 81    287 312     Most Recent 3 BMP's:  Recent Labs   Lab Test 01/29/25  0604 01/28/25  1816 08/27/24  0828    138 138   POTASSIUM 4.3 4.3 4.5   CHLORIDE 100 99 101   CO2 27 24 23   BUN 18.0 16.8 19.7   CR 0.98 0.97 0.90   ANIONGAP 8 15 14   EVER 9.1 9.7 9.4   * 155* 157*   ,   Results for orders placed or performed during the hospital encounter of 01/28/25   Head CT w/o contrast    Narrative    EXAM: CT HEAD W/O CONTRAST  LOCATION: [facility]  DATE: 1/28/2025    INDICATION: left sided numbness  COMPARISON: None.  TECHNIQUE: Routine CT Head without IV contrast. Multiplanar reformats. Dose reduction techniques were used.    FINDINGS:  INTRACRANIAL CONTENTS: No intracranial hemorrhage,  extraaxial collection, or mass effect.  No CT evidence of acute infarct. Normal parenchymal attenuation. Normal ventricles and sulci.     VISUALIZED ORBITS/SINUSES/MASTOIDS: No intraorbital abnormality. Left maxillary sinus mucosal thickening and air-fluid level. No middle ear or mastoid effusion.    BONES/SOFT TISSUES: No acute abnormality.      Impression    IMPRESSION:  1.  No acute intracranial process.  2.  Nonspecific left maxillary air-fluid level, can be seen with acute sinusitis in the appropriate clinical setting.   Echocardiogram Complete     Value    LVEF  55-60%    [facility]    [id number]  [id number]  [id number]^ALEXIS^KENNEDI     [facility]  Echocardiography Laboratory  [address] [place] [id number]     Name: GENEVIEVE HANEY  MRN: [id number]  : 1960  Study Date: 2025 11:17 AM  Age: 64 yrs  Gender: Male  Patient Location: [facility]  Reason For Study: Atrial Fibrillation  Ordering Physician: KENNEDI ESPINOZA  Referring Physician: KENNEDI ESPINOZA  Performed By: Xochitl Salas     BSA: 2.4 m2  Height: 73 in  Weight: 265 lb  HR: 58  BP: 94/73 mmHg  ______________________________________________________________________________  Procedure  Echocardiogram with two-dimensional, color and spectral Doppler. Definity (NDC  #35927-095) given intravenously. Technically difficult study.  ______________________________________________________________________________  Interpretation Summary     1. Normal left ventricular size and systolic function. Estimated ejection  fraction is 55-60%.  2. Grade 1 (early) left ventricular diastolic dysfunction consistent with  normal left ventricular filling pressure.  3. Normal right ventricular size and systolic function.  4. Normal-sized atria.  5. No significant valve disease.  6. 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 Mild ascending aorta  dilatation (4.1 cm).  7. No prior exam available for  comparison.  ______________________________________________________________________________  Left Ventricle  The left ventricle is normal in size. There is normal left ventricular wall  thickness. Left ventricular systolic function is normal. Grade I or early  diastolic dysfunction. The visual ejection fraction is 55-60%. No regional  wall motion abnormalities noted.     Right Ventricle  The right ventricle is normal in size and function.     Atria  Normal left atrial size. Right atrial size is normal. There is no color  Doppler evidence of an atrial shunt.     Mitral Valve  The mitral valve leaflets are mildly thickened. There is trace mitral  regurgitation.     Tricuspid Valve  The tricuspid valve is not well visualized, but is grossly normal. There is  trace tricuspid regurgitation. Right ventricular systolic pressure could not  be approximated due to inadequate tricuspid regurgitation.     Aortic Valve  The aortic valve is trileaflet with aortic valve sclerosis. No aortic  regurgitation is present. No aortic stenosis is present.     Pulmonic Valve  The pulmonic valve is normal in structure and function.     Vessels  The aortic Sinus(es) of Valsalva are mildly dilated. Mild ascending aorta  dilatation (4.1 cm). Inferior vena cava not well visualized for estimation of  right atrial pressure.     Pericardium  There is no pericardial effusion.     Rhythm  Sinus rhythm was noted.  ______________________________________________________________________________  MMode/2D Measurements & Calculations  IVSd: 1.0 cm     LVIDd: 5.2 cm  LVIDs: 2.9 cm  LVPWd: 1.2 cm  FS: 43.3 %  LV mass(C)d: 216.4 grams  LV mass(C)dI: 89.2 grams/m2  Ao root diam: 4.1 cm  asc Aorta Diam: 4.1 cm  LVOT diam: 2.1 cm  LVOT area: 3.6 cm2  Ao root diam index Ht(cm/m): 2.2  Ao root diam index BSA (cm/m2): 1.7  Asc Ao diam index BSA (cm/m2): 1.7  Asc Ao diam index Ht(cm/m): 2.2  LA Volume (BP): 22.8 ml     LA Volume Index (BP): 9.4 ml/m2  RV Base: 3.3  cm  RWT: 0.46  TAPSE: 2.4 cm     Doppler Measurements & Calculations  MV E max jose: 56.6 cm/sec  MV A max jose: 78.1 cm/sec  MV E/A: 0.72  MV dec time: 0.27 sec  Ao V2 max: 115.0 cm/sec  Ao max P.0 mmHg  Ao V2 mean: 76.4 cm/sec  Ao mean PG: 3.0 mmHg  Ao V2 VTI: 22.2 cm  ABE(I,D): 3.1 cm2  ABE(V,D): 3.3 cm2  LV V1 max P.6 mmHg  LV V1 max: 107.0 cm/sec  LV V1 VTI: 19.5 cm  SV(LVOT): 69.3 ml  SI(LVOT): 28.6 ml/m2  PA acc time: 0.11 sec  AV Jose Ratio (DI): 0.93  ABE Index (cm2/m2): 1.3  E/E' av.5     Lateral E/e': 4.8  Medial E/e': 6.2  RV S Jose: 14.0 cm/sec     ______________________________________________________________________________  Report approved by: Kenny Leslie MD on 2025 01:20 PM             Discharge Medications   Discharge Medication List as of 2025  3:00 PM        START taking these medications    Details   apixaban ANTICOAGULANT (ELIQUIS) 5 MG tablet Take 1 tablet (5 mg) by mouth 2 times daily., Disp-30 tablet, R-1, E-Prescribe      metoprolol succinate ER (TOPROL XL) 25 MG 24 hr tablet Take 1 tablet (25 mg) by mouth daily., Disp-30 tablet, R-1, E-Prescribe           CONTINUE these medications which have NOT CHANGED    Details   buprenorphine HCl-naloxone HCl (SUBOXONE) 8-2 MG per film PLACE 1 FILM UNDER THE TONGUE THREE TIMES DAILY, Disp-90 Film, R-2, E-Prescribe      hydrocortisone 2.5 % cream Apply topically 2 times daily. When rash is flared only then stopDisp-30 g, [id number]      ketoconazole (NIZORAL) 2 % external cream Apply daily to skin folds where rash is proneDisp-60 g, [id number]      lisinopril (ZESTRIL) 30 MG tablet Take 1 tablet (30 mg) by mouth daily., Disp-90 tablet, R-1, E-Prescribe      metFORMIN (GLUCOPHAGE XR) 500 MG 24 hr tablet Take 2 tablets (1,000 mg) by mouth 2 times daily (with meals)., Disp-360 tablet, R-1, E-Prescribe      traZODone (DESYREL) 50 MG tablet Take 1 tablet (50 mg) by mouth at bedtime, Disp-30 tablet, R-2, E-Prescribe       albuterol (PROAIR HFA/PROVENTIL HFA/VENTOLIN HFA) 108 (90 Base) MCG/ACT inhaler Inhale 2 puffs into the lungs every 6 hours as needed for shortness of breath, wheezing or cough., Disp-18 g, R-1, E-PrescribePharmacy may dispense brand covered by insurance (Proair, or proventil or ventolin or generic albuterol inhaler)      blood glucose (NO BRAND SPECIFIED) test strip Use to test blood sugar 1 times daily or as directed. To accompany: Blood Glucose Monitor Brands: per insurance., Disp-100 strip, R-6, E-Prescribe      blood glucose monitoring (NO BRAND SPECIFIED) meter device kit Use to test blood sugar 1 times daily or as directed. Preferred blood glucose meter OR supplies to accompany: Blood Glucose Monitor Brands: per insurance.Disp-1 kit, [id number]      thin (NO BRAND SPECIFIED) lancets Use with lanceting device. To accompany: Blood Glucose Monitor Brands: per insurance., Disp-100 each, R-6, E-Prescribe      tirzepatide (MOUNJARO) 2.5 MG/0.5ML pen Inject 2.5 mg subcutaneously every 7 days., Disp-2 mL, R-0, Local Print      Tirzepatide 2.5 MG/0.5ML SOAJ Inject 0.5 mLs (2.5 mg) subcutaneously every 7 days., Disp-2 mL, R-0, E-Prescribe           STOP taking these medications       metoprolol tartrate (LOPRESSOR) 25 MG tablet Comments:   Reason for Stopping:             Allergies   No Known Allergies

## 2025-01-31 ENCOUNTER — HOSPITAL ENCOUNTER (EMERGENCY)
Facility: CLINIC | Age: 65
Discharge: HOME | End: 2025-01-31
Attending: EMERGENCY MEDICINE | Admitting: EMERGENCY MEDICINE
Payer: COMMERCIAL

## 2025-01-31 VITALS
RESPIRATION RATE: 16 BRPM | DIASTOLIC BLOOD PRESSURE: 86 MMHG | OXYGEN SATURATION: 99 % | TEMPERATURE: 98.4 F | HEART RATE: 52 BPM | SYSTOLIC BLOOD PRESSURE: 136 MMHG

## 2025-01-31 DIAGNOSIS — I10 HYPERTENSION, UNSPECIFIED TYPE: ICD-10-CM

## 2025-01-31 LAB
ANION GAP SERPL CALCULATED.3IONS-SCNC: 13 MMOL/L (ref 7–15)
BUN SERPL-MCNC: 20.4 MG/DL (ref 8–23)
CALCIUM SERPL-MCNC: 9 MG/DL (ref 8.8–10.4)
CHLORIDE SERPL-SCNC: 101 MMOL/L (ref 98–107)
CREAT SERPL-MCNC: 0.93 MG/DL (ref 0.67–1.17)
EGFRCR SERPLBLD CKD-EPI 2021: >90 ML/MIN/1.73M2
GLUCOSE SERPL-MCNC: 177 MG/DL (ref 70–99)
HCO3 SERPL-SCNC: 24 MMOL/L (ref 22–29)
HOLD SPECIMEN: NORMAL
HOLD SPECIMEN: NORMAL
POTASSIUM SERPL-SCNC: 4.5 MMOL/L (ref 3.4–5.3)
SODIUM SERPL-SCNC: 138 MMOL/L (ref 135–145)

## 2025-01-31 PROCEDURE — 250N000013 HC RX MED GY IP 250 OP 250 PS 637: Performed by: EMERGENCY MEDICINE

## 2025-01-31 PROCEDURE — 80048 BASIC METABOLIC PNL TOTAL CA: CPT | Performed by: EMERGENCY MEDICINE

## 2025-01-31 PROCEDURE — 36415 COLL VENOUS BLD VENIPUNCTURE: CPT | Performed by: EMERGENCY MEDICINE

## 2025-01-31 PROCEDURE — 99284 EMERGENCY DEPT VISIT MOD MDM: CPT

## 2025-01-31 PROCEDURE — 93005 ELECTROCARDIOGRAM TRACING: CPT

## 2025-01-31 RX ORDER — ACETAMINOPHEN 500 MG
1000 TABLET ORAL ONCE
Status: COMPLETED | OUTPATIENT
Start: 2025-01-31 | End: 2025-01-31

## 2025-01-31 RX ADMIN — ACETAMINOPHEN 1000 MG: 500 TABLET, FILM COATED ORAL at 19:49

## 2025-01-31 ASSESSMENT — ACTIVITIES OF DAILY LIVING (ADL)
ADLS_ACUITY_SCORE: 41
ADLS_ACUITY_SCORE: 41

## 2025-01-31 ASSESSMENT — COLUMBIA-SUICIDE SEVERITY RATING SCALE - C-SSRS
6. HAVE YOU EVER DONE ANYTHING, STARTED TO DO ANYTHING, OR PREPARED TO DO ANYTHING TO END YOUR LIFE?: NO
2. HAVE YOU ACTUALLY HAD ANY THOUGHTS OF KILLING YOURSELF IN THE PAST MONTH?: NO
1. IN THE PAST MONTH, HAVE YOU WISHED YOU WERE DEAD OR WISHED YOU COULD GO TO SLEEP AND NOT WAKE UP?: NO

## 2025-02-01 NOTE — ED TRIAGE NOTES
Pt was admitted recently for A-fib and discharged yesterday. Pt noticed today that he has HTN, 190s at home. Pt states headache.

## 2025-02-01 NOTE — ED PROVIDER NOTES
Emergency Department Note      History of Present Illness     Chief Complaint   Hypertension      HPI   Henrique Nathan is a 64 year old male with history of recently diagnosed atrial fibrillation, hypertension on lisinopril, metoprolol, presenting today with elevated blood pressures.  Patient states that since discharge, he is unsure if he should be taking lisinopril with metoprolol.  He has been taking the metoprolol, the missed taking his lisinopril today because he was not sure if it would cause side effects with the metoprolol.  He went to call the clinic, but got busy at work.  He had a very stressful day at work.  To help with the stress, he states that he smoked half a cigarette, and chewed some Nicorette gum.  He has also had coffee and 1 Coca-Cola today.  He states he has been eating a lot of salt in his diet over the past year.  While at work, he developed pressure behind his eyes, and involving his face.  He checked his blood pressures at home, but was not sure if the machine was functioning properly.  He then checked blood pressures at [employer], and bought a blood pressure monitor at [employer] as well, which showed systolic pressures in the 190s.  Yesterday, his blood pressure was 140/85.  He denies any vision changes, numbness or weakness, chest pain, shortness of breath, leg swelling.  He has been taking his Eliquis as directed.    Independent Historian   Wife as detailed above.    Review of External Notes   I reviewed discharge summary.  Patient was admitted to [facility] 1/28/2025, found to have A-fib with RVR, converted to sinus rhythm spontaneously.  1/29/25: Echo showed EF of 55-60%. Grade 1 (early) left ventricular diastolic dysfunction consistent with normal left ventricular filling pressure. Normal right ventricular size and systolic function. Normal-sized atria. No significant valve disease. 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 Mild ascending aorta dilatation (4.1  cm).     Pt was started on Eliquis and Metoprolol at time of discharge. 14 day Zio Patch monitor to be mailed out.     Pt is scheduled for an OV on 3/3/25 at 0750 with MIKE Kasey Novak at our [place] Office.     Medical History and Problem List   Past Medical History:   Diagnosis Date    Malignant melanoma (H)        Medications   albuterol (PROAIR HFA/PROVENTIL HFA/VENTOLIN HFA) 108 (90 Base) MCG/ACT inhaler  apixaban ANTICOAGULANT (ELIQUIS) 5 MG tablet  blood glucose (NO BRAND SPECIFIED) test strip  blood glucose monitoring (NO BRAND SPECIFIED) meter device kit  buprenorphine HCl-naloxone HCl (SUBOXONE) 8-2 MG per film  hydrocortisone 2.5 % cream  ketoconazole (NIZORAL) 2 % external cream  lisinopril (ZESTRIL) 30 MG tablet  metFORMIN (GLUCOPHAGE XR) 500 MG 24 hr tablet  metoprolol succinate ER (TOPROL XL) 25 MG 24 hr tablet  thin (NO BRAND SPECIFIED) lancets  tirzepatide (MOUNJARO) 2.5 MG/0.5ML pen  Tirzepatide 2.5 MG/0.5ML SOAJ  traZODone (DESYREL) 50 MG tablet        Surgical History   No past surgical history on file.    Physical Exam     Patient Vitals for the past 24 hrs:   BP Temp Temp src Pulse Resp SpO2   01/31/25 1824 (!) 201/98 98.4  F (36.9  C) Temporal 72 18 98 %     Physical Exam  General: alert, lying comfortably on gurney  HENT: mucous membranes moist  CV: regular rate, regular rhythm, no murmurs rubs or gallops.  Resp: normal effort, clear throughout, no crackles or wheezing  GI: abdomen soft and nontender, no guarding  MSK: no bony tenderness  Skin: appropriately warm and dry  Extremities: no edema, calves non-tender  Neuro:    Speech is fluent, cognition is normal.     EOMI, symmetric grimace.     Str 5/5 in RUE, LUE, RLE, LLE.     Fine touch sensation intact in BUE/BLE.  Psych: normal mood and affect      Diagnostics     Lab Results   Labs Ordered and Resulted from Time of ED Arrival to Time of ED Departure   BASIC METABOLIC PANEL - Abnormal       Result Value    Sodium 138      Potassium 4.5       Chloride 101      Carbon Dioxide (CO2) 24      Anion Gap 13      Urea Nitrogen 20.4      Creatinine 0.93      GFR Estimate >90      Calcium 9.0      Glucose 177 (*)        Imaging   No orders to display       EKG   ECG taken at 1829 on 1/31/2025, ECG read at 1832 on 1/31/2025  Normal sinus rhythm, normal EKG  No change as compared to prior, dated 1 /29/25.  Rate 64 bpm. DC interval 184 ms. QRS duration 100 ms. QT/QTc 388/400 ms. P-R-T axes 63 12 43.    Independent Interpretation   None    ED Course      Medications Administered   Medications - No data to display    Procedures   Procedures     Discussion of Management   None    ED Course        Additional Documentation  None    Medical Decision Making / Diagnosis     CMS Diagnoses: None    MIPS       None    MDM   Henrique Nathan is a 64 year old male with history of recently diagnosed atrial fibrillation, hypertension, presenting today with elevated blood pressures.  On exam, the patient has a normal neuroexam.  EKG does not show signs of acute ischemia.  Otherwise, exam is unremarkable.  I do not suspect endorgan damage, including encephalopathy, aortic dissection, acute coronary syndrome, heart failure.  Labs demonstrate normal creatinine, elevated blood sugar.  While in the ED, the patient's blood pressures have decreased, which is likely related to him taking his lisinopril a couple hours prior to arrival.  We discussed that it is safe to take lisinopril and metoprolol, intermittently.  I recommended follow-up with cardiology and PCP.  He will continue to check blood pressures once a day at home.  Return to the ED for severe headache, altered mental status, chest pain, or for any other concerns.    Disposition   The patient was discharged.     Diagnosis     ICD-10-CM    1. Hypertension, unspecified type  I10              MD Yany Krishnan, Maggy Deshpande MD  02/01/25 173

## 2025-02-03 ENCOUNTER — OFFICE VISIT (OUTPATIENT)
Dept: FAMILY MEDICINE | Facility: CLINIC | Age: 65
End: 2025-02-03
Payer: COMMERCIAL

## 2025-02-03 ENCOUNTER — TELEPHONE (OUTPATIENT)
Dept: CARDIOLOGY | Facility: CLINIC | Age: 65
End: 2025-02-03

## 2025-02-03 VITALS
SYSTOLIC BLOOD PRESSURE: 150 MMHG | BODY MASS INDEX: 36.12 KG/M2 | RESPIRATION RATE: 16 BRPM | HEART RATE: 73 BPM | OXYGEN SATURATION: 97 % | WEIGHT: 266.7 LBS | DIASTOLIC BLOOD PRESSURE: 82 MMHG | HEIGHT: 72 IN

## 2025-02-03 DIAGNOSIS — G47.00 INSOMNIA, UNSPECIFIED TYPE: ICD-10-CM

## 2025-02-03 DIAGNOSIS — I48.91 ATRIAL FIBRILLATION WITH RAPID VENTRICULAR RESPONSE (H): Primary | ICD-10-CM

## 2025-02-03 DIAGNOSIS — I10 HYPERTENSION, UNSPECIFIED TYPE: ICD-10-CM

## 2025-02-03 DIAGNOSIS — F19.11 HISTORY OF SUBSTANCE ABUSE (H): ICD-10-CM

## 2025-02-03 DIAGNOSIS — E11.21 TYPE 2 DIABETES MELLITUS WITH DIABETIC NEPHROPATHY, WITHOUT LONG-TERM CURRENT USE OF INSULIN (H): ICD-10-CM

## 2025-02-03 DIAGNOSIS — G47.33 OSA (OBSTRUCTIVE SLEEP APNEA): ICD-10-CM

## 2025-02-03 DIAGNOSIS — E66.01 MORBID OBESITY (H): ICD-10-CM

## 2025-02-03 PROBLEM — R73.09 ELEVATED HEMOGLOBIN A1C: Status: RESOLVED | Noted: 2018-02-22 | Resolved: 2025-02-03

## 2025-02-03 LAB
ANION GAP SERPL CALCULATED.3IONS-SCNC: 13 MMOL/L (ref 7–15)
BUN SERPL-MCNC: 16.5 MG/DL (ref 8–23)
CALCIUM SERPL-MCNC: 9.6 MG/DL (ref 8.8–10.4)
CHLORIDE SERPL-SCNC: 97 MMOL/L (ref 98–107)
CREAT SERPL-MCNC: 0.91 MG/DL (ref 0.67–1.17)
EGFRCR SERPLBLD CKD-EPI 2021: >90 ML/MIN/1.73M2
EST. AVERAGE GLUCOSE BLD GHB EST-MCNC: 157 MG/DL
GLUCOSE SERPL-MCNC: 211 MG/DL (ref 70–99)
HBA1C MFR BLD: 7.1 % (ref 0–5.6)
HCO3 SERPL-SCNC: 24 MMOL/L (ref 22–29)
POTASSIUM SERPL-SCNC: 4.4 MMOL/L (ref 3.4–5.3)
SODIUM SERPL-SCNC: 134 MMOL/L (ref 135–145)

## 2025-02-03 PROCEDURE — 36415 COLL VENOUS BLD VENIPUNCTURE: CPT | Performed by: FAMILY MEDICINE

## 2025-02-03 PROCEDURE — 83036 HEMOGLOBIN GLYCOSYLATED A1C: CPT | Performed by: FAMILY MEDICINE

## 2025-02-03 PROCEDURE — 80048 BASIC METABOLIC PNL TOTAL CA: CPT | Performed by: FAMILY MEDICINE

## 2025-02-03 RX ORDER — LISINOPRIL 40 MG/1
40 TABLET ORAL DAILY
Qty: 30 TABLET | Refills: 1 | Status: SHIPPED | OUTPATIENT
Start: 2025-02-03 | End: 2025-02-03

## 2025-02-03 RX ORDER — TRAZODONE HYDROCHLORIDE 50 MG/1
50 TABLET ORAL AT BEDTIME
Qty: 30 TABLET | Refills: 2 | Status: SHIPPED | OUTPATIENT
Start: 2025-02-03

## 2025-02-03 RX ORDER — METOPROLOL SUCCINATE 50 MG/1
50 TABLET, EXTENDED RELEASE ORAL DAILY
Qty: 30 TABLET | Refills: 1 | Status: SHIPPED | OUTPATIENT
Start: 2025-02-03

## 2025-02-03 RX ORDER — METOPROLOL SUCCINATE 50 MG/1
50 TABLET, EXTENDED RELEASE ORAL DAILY
Qty: 30 TABLET | Refills: 1 | OUTPATIENT
Start: 2025-02-03

## 2025-02-03 ASSESSMENT — PAIN SCALES - GENERAL: PAINLEVEL_OUTOF10: MODERATE PAIN (4)

## 2025-02-03 NOTE — TELEPHONE ENCOUNTER
[facility] Call Center    Phone Message    May a detailed message be left on voicemail: yes     Reason for Call: Other: Patient states that he had a heart monitor placed while in the hospital. The zio patch fell off right away. He called [employer] and they recommended calling cardiology to see what the option would be since he sweats a lot and most likely this will happen again. Please call the patient back to discuss.      Action Taken: Other: cardiology    Travel Screening: Not Applicable  Thank you!  [facility]       Date of Service:

## 2025-02-03 NOTE — TELEPHONE ENCOUNTER
Called pt to discuss ZP that fell off. 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 Discussed with pt that unfortunately all monitors have to stick on the skin in some way and asked about the possibility of him wearing it for a shorter duration and over the weekend or a few days when he wont be working. Pt states that this could work and he would be willing to try it. Stated he could probably do that this Friday- Monday if the monitor would arrive in time.    Will discuss this plan with ALIS Parks to see if she would be ok with him wearing the zio for 3 days rather than 14 or if she has another other suggestions.    SIDDHATRHA Haile

## 2025-02-03 NOTE — TELEPHONE ENCOUNTER
Writer is routing question to ALIS Lubin, because of the upcoming appointment for EP, and because Dr. Rucker is out of the clinic for several weeks.     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   Could patient wear a heart rhythm monitor such as Holter Monitor for 48 hr on a weekend?    What other options might there be for follow up on his rhythm status after hospitalization.    Routing to Kasey for recommendations.

## 2025-02-03 NOTE — PROGRESS NOTES
Assessment & Plan     Atrial fibrillation with rapid ventricular response (H)  He was recently hospitalized for new diagnosis of atrial fibrillation and started on Eliquis and metoprolol.  I recommended increasing the metoprolol dose to 50 mg daily to help get the BP under better control.  He will continue to monitor his blood pressure and pulse at home.  Unfortunately, the Zio patch fell off after the first day and he does not feel like this device will work for him.  I recommended he contact the cardiology clinic where he is scheduled for his follow-up visit to see if they have an alternative they would like him to do before his appointment.    Jerrell is scheduled for an OV with cardiology on 3/3/25 at 0750 with MIKE Kasey Novak at the [facility].    Hypertension, unspecified type  I expressed my concern that his BP is still running high.  We discussed lifestyle modifications that can help.  I also recommended increasing the lisinopril to 40 mg daily and increasing the metoprolol XL to 50 mg daily.  He expressed understanding about this and will continue to watch the BP and pulse closely at home.  If it is still running high he is going to schedule follow-up visit here.  If the BP is back under control he is going to follow-up with cardiology as scheduled on 3/3/2025.  - Basic metabolic panel  (Ca, Cl, CO2, Creat, Gluc, K, Na, BUN); Future  - metoprolol succinate ER (TOPROL XL) 50 MG 24 hr tablet; Take 1 tablet (50 mg) by mouth daily.  - lisinopril (ZESTRIL) 40 MG tablet; Take 1 tablet (40 mg) by mouth daily.  - Basic metabolic panel  (Ca, Cl, CO2, Creat, Gluc, K, Na, BUN)    Type 2 diabetes mellitus with diabetic nephropathy, without long-term current use of insulin (H)  His hemoglobin A1c on 8/27/2024 was 7.6%.  He reports only taking metformin XR 1000 mg daily instead of twice daily as prescribed.  He is due to have the A1c rechecked.  I recommended he adjust the dose to taking a total of 2000 mg daily.   He was prescribed Mounjaro last fall, but he never started it out of concern about possible GI side effects.  - HEMOGLOBIN A1C; Future  - HEMOGLOBIN A1C    Morbid obesity (H)  He is working on lifestyle modifications help with weight loss.  He did not start the GLP-1 last fall out of concern regarding possible GI side effects.    History of substance abuse (H)  Currently on Suboxone and he feels like this issue is stable.  He denies any recent alcohol or drug use.    Insomnia, unspecified type  Reports running out of trazodone.  He was given a refill as this medication has worked well for him.  - traZODone (DESYREL) 50 MG tablet; Take 1 tablet (50 mg) by mouth at bedtime.    Obstructive sleep apnea  Stable on CPAP    The longitudinal plan of care for the diagnosis(es)/condition(s) as documented were addressed during this visit. Due to the added complexity in care, I will continue to support Jerrell in the subsequent management and with ongoing continuity of care.      MED REC REQUIRED  Post Medication Reconciliation Status:  Discharge medications reconciled and changed, see notes/orders      Subjective   Jerrell is a 64 year old, presenting for the following health issues:  ER F/U, Diabetes, Hypertension, and Headache        2/3/2025     9:25 AM   Additional Questions   Roomed by Maya DOWNING   Accompanied by n/a     History of Present Illness       Diabetes:   He presents for follow up of diabetes.  He is checking home blood glucose a few times a month.   He checks blood glucose at bedtime.  Blood glucose is never over 200 and never under 70. He is aware of hypoglycemia symptoms including shakiness.    He has no concerns regarding his diabetes at this time.  He is having numbness in feet and weight gain.  The patient has not had a diabetic eye exam in the last 12 months.          Hypertension: He presents for follow up of hypertension.  He does check blood pressure  regularly outside of the clinic. Outside blood pressures have  "been over 140/90. He does not follow a low salt diet.     Headaches:   Since the patient's last clinic visit, headaches are: no change  The patient is getting headaches:  I think this is due to high bood peasure  He is able to do normal daily activities when he has a migraine.  The patient is taking the following rescue/relief medications:  Ibuprofen (Advil, Motrin)   Patient states \"I get some relief\" from the rescue/relief medications.   The patient is taking the following medications to prevent migraines:  No medications to prevent migraines  In the past 4 weeks, the patient has gone to an Urgent Care or Emergency Room 0 times times due to headaches.    He eats 0-1 servings of fruits and vegetables daily.He consumes 1 sweetened beverage(s) daily.He exercises with enough effort to increase his heart rate 9 or less minutes per day.  He exercises with enough effort to increase his heart rate 3 or less days per week.   He is taking medications regularly.         ED/UC Followup:    Facility:  [facility] ED   Date of visit: 1/31/2025   Reason for visit: Hypertension   Current Status: Worse.       Jerrell is a 64-year-old gentleman with a medical history significant for type 2 diabetes, hypertension, morbid obesity, obstructive sleep apnea (on CPAP), insomnia and a history of substance abuse currently on Suboxone who was admitted to [facility] 1/28/2025 and found to have A-fib with RVR.  He was given a dose of diltiazem and he converted to sinus rhythm spontaneously.    1/29/25: Echo showed EF of 55-60%. Grade 1 (early) left ventricular diastolic dysfunction consistent with normal left ventricular filling pressure. Normal right ventricular size and systolic function. Normal-sized atria. No significant valve disease. 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 Mild ascending aorta dilatation (4.1 cm).     Pt was started on Eliquis and Metoprolol at time of discharge. 14 day Zio Patch monitor to be mailed " "out.  He reports that the Zio patch fell off after the first day.  He does not think that he can get this to stay on his skin.     He was seen in the emergency department again for elevated blood pressure readings in the community with a systolic blood pressure in the 190s.  He had not taken his lisinopril that day until just prior to arriving at the ED.  He was also noticing some pressure behind his eyes.  He was under a lot of stress at the time and consumed caffeine, half a cigarette and Nicorette gum as well as salt intake.  In the ED on 1/31/2025 his BP was 201/98.  His lab results showed a glucose of 177.  The BMP was otherwise normal.  He had an EKG which showed normal sinus rhythm.  His BP steadily improved in the emergency department and he was discharged home.    He reports that home blood pressure readings are still running high in the 160s-170s/90s.  His pulse is consistently in the 70s.  He reports still having headache symptoms which she attributes to the elevated BP.  He has taken an extra 10 mg of lisinopril on occasion when his BP is running high.  He took 30 mg today though.      Review of Systems  Constitutional, HEENT, cardiovascular, pulmonary, GI, , musculoskeletal, neuro, skin, endocrine and psych systems are negative, except as otherwise noted.      Objective    BP (!) 150/82   Pulse 73   Resp 16   Ht 1.83 m (6' 0.05\")   Wt 121 kg (266 lb 11.2 oz)   SpO2 97%   BMI 36.12 kg/m    Body mass index is 36.12 kg/m .  Physical Exam   GENERAL: alert and no distress  EYES: Eyes grossly normal to inspection, PERRL and conjunctivae and sclerae normal  HENT: nose and mouth without ulcers or lesions  NECK: no adenopathy, no asymmetry, masses, or scars  RESP: lungs clear to auscultation - no rales, rhonchi or wheezes  CV: regular rate and rhythm, normal S1 S2, no S3 or S4, no murmur, click or rub, no peripheral edema  ABDOMEN: soft, nontender, no hepatosplenomegaly, no masses and bowel sounds " normal  MS: no gross musculoskeletal defects noted, no edema  SKIN: no suspicious lesions or rashes  NEURO: Normal strength and tone, mentation intact and speech normal  PSYCH: mentation appears normal, affect normal/bright            Signed Electronically by: Sam Stewart DO

## 2025-02-03 NOTE — TELEPHONE ENCOUNTER
Team heard back from Kasey.  She requested order for 48 hr Holter monitor.    Writer called and spoke with patient to provide education for this monitor.  He is happy to do this, and will gladly come in on a Friday afternoon.    Message sent to  team requesting assistance to get this scheduled for patient.    Gabby Kulkarni RN on 2/3/2025 at 3:45 PM

## 2025-02-04 RX ORDER — LISINOPRIL 40 MG/1
40 TABLET ORAL DAILY
Qty: 90 TABLET | Refills: 0 | Status: SHIPPED | OUTPATIENT
Start: 2025-02-04

## 2025-02-07 ENCOUNTER — HOSPITAL ENCOUNTER (OUTPATIENT)
Dept: CARDIOLOGY | Facility: CLINIC | Age: 65
Discharge: HOME | End: 2025-02-07
Payer: COMMERCIAL

## 2025-02-07 DIAGNOSIS — I48.91 ATRIAL FIBRILLATION WITH RAPID VENTRICULAR RESPONSE (H): ICD-10-CM

## 2025-02-07 PROCEDURE — 93227 XTRNL ECG REC<48 HR R&I: CPT | Performed by: INTERNAL MEDICINE

## 2025-02-07 PROCEDURE — 93225 XTRNL ECG REC<48 HRS REC: CPT

## 2025-02-07 PROCEDURE — 93226 XTRNL ECG REC<48 HR SCAN A/R: CPT

## 2025-02-24 LAB — CV ZIO PRELIM RESULTS: NORMAL

## 2025-03-03 ENCOUNTER — OFFICE VISIT (OUTPATIENT)
Dept: CARDIOLOGY | Facility: CLINIC | Age: 65
End: 2025-03-03
Payer: COMMERCIAL

## 2025-03-03 VITALS
HEART RATE: 63 BPM | DIASTOLIC BLOOD PRESSURE: 81 MMHG | WEIGHT: 277.6 LBS | OXYGEN SATURATION: 98 % | HEIGHT: 72 IN | BODY MASS INDEX: 37.6 KG/M2 | SYSTOLIC BLOOD PRESSURE: 152 MMHG

## 2025-03-03 DIAGNOSIS — I48.91 ATRIAL FIBRILLATION WITH RAPID VENTRICULAR RESPONSE (H): ICD-10-CM

## 2025-03-03 PROCEDURE — 99204 OFFICE O/P NEW MOD 45 MIN: CPT

## 2025-03-03 PROCEDURE — 3079F DIAST BP 80-89 MM HG: CPT

## 2025-03-03 PROCEDURE — 3077F SYST BP >= 140 MM HG: CPT

## 2025-03-03 PROCEDURE — G2211 COMPLEX E/M VISIT ADD ON: HCPCS

## 2025-03-03 NOTE — PATIENT INSTRUCTIONS
It was a pleasure seeing you today!    Today's Recommendations    Continue medications as prescribed  Continue to monitor blood pressure at home, contact our team if it is continually elevated > 120s/90s  Follow up in 6 months, earlier if experiencing worsening or new cardiac symptoms.     Please send a [employer] message or call our team with any questions or concerns.     Kasey Novak PA-C     Electrophysiology (EP) Nurse: [phone]  Device Nurse: [phone]  General scheduling and after hours: [phone]  Electrophysiology (EP) scheduling [phone]

## 2025-03-03 NOTE — PROGRESS NOTES
Electrophysiology Clinic Progress Note  Henrique Nathan MRN# [id number]   YOB: 1960 Age: 64 year old     Primary cardiologist: Dr. Rucker    Reason for visit: Follow up for: Recent Hospitalization- follow up after discharge      Plan:  New paroxysmal atrial fibrillation with RVR  Continue metoprolol XL 50 mg daily and Eliquis  Discussed lifestyle contributors such as caffeine and nicotine with recommendation to limit/reduce intake  Follow-up in 6 months for A-fib and BP check  Hypertension  Seems to have anxiety component with normalization at home following subsequent readings  Discussed diet and lifestyle changes to assist with management of hypertension, along with stress management  Continue to monitor annual lipid panel with recent   Continue lisinopril 40 mg daily      Detailed Assessment:  New paroxysmal atrial fibrillation with RVR  Admitted to [facility] 1/28/2025 - 1/29/2025 for A-fib RVR, converted to NSR spontaneously  Started on metoprolol XL and anticoagulation  Was unable to do 14-day ZIO 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  No A-fib noted max  bpm.  2% supraventricular ectopy burden.  Event button not triggered.  Metoprolol XL 50 mg daily  Eliquis 5 mg twice daily  [id number]: 3 (age, diabetes mellitus, hypertension)  Assessment of CAD risk:  No known family history  Chest CT 9/4/2024- no coronary artery calcification  Lipid panel 4/2024 within normal limits other than   LDL 88 in 2023  Hypertension  Lisinopril 40 mg daily  Mild ascending aorta dilation (4.1 cm)  TTE on 1/29/2025- LVEF 55-60%, normal RV size and function, normal sized atria, no significant valvular disease, mild ascending aortic dilation (4.1 cm)  Type 2 diabetes mellitus with diabetic nephropathy, uncontrolled  A1c on 4/18/24- 7.2%  Mild opioid use disorder  Suboxone  JUAN M  Uses CPAP  Hypersomnia  Anxiety/depression  Previously  on treatment for it, patient reports he stopped taking it, but plans to potentially start taking it again due to increased stress and anxiety  Insomnia  Trazodone  History of smoking  History of alcohol use disorder    HPI:  Henrique Nathan is a pleasant 64 year old patient who presents today for hospital follow-up.  He was admitted for A-fib RVR on 2025.  He spontaneously converted to normal sinus rhythm.  He felt fatigued and overall not well with A-fib RVR.  He was recommended for 1 month follow-up with the EP and cardiac monitor.   He overall has been feeling well.  Continues to monitor blood pressure at home.  He reports SBP usually goes down to 120s after taking BP a few times, although starts around 150s. Feels like anxiety plays a role in his initial increased BP.  Reports he was on treatment for anxiety and stopped taking his medication due to thinking it was ineffective.  However, in retrospect he thinks this may have been helping him more than originally thought.  He uses Nicorette gum while trying to quit smoking.  He has history of alcohol use many years ago and does not use alcohol.  He continues to feel tired and reports more frequent frontal headaches, consistent with tension type.  Reports he has been more stressed recently and anxious.  Works in manual labor in construction.  Has been having some mild dizziness that he attributes to his new blood pressure medications.  Reports intermittent, brief episodes of chest pressure/tightness that quickly resolve and are not associated with exertion or other symptoms.  Denies shortness of breath, chest pain, palpitations, edema.  Reports his sister  in her sleep at age 65 from an enlarged heart, thought to be related to an untreated sleep disorder.  Denies family history of heart disease or heart attack.      Kasey Novak PA-C   Physician Assistant   [facility]     PAST SURGICAL HISTORY: History reviewed. No pertinent  "surgical history.    FAMILY HISTORY:   Family History   Problem Relation Age of Onset    No Known Problems Mother     No Known Problems Father        SOCIAL HISTORY:   Social History     Tobacco Use    Smoking status: Former     Current packs/day: 0.00     Types: Cigarettes     Quit date: 10/1/2021     Years since quitting: 3.4     Passive exposure: Past    Smokeless tobacco: Current     Types: Chew   Substance Use Topics    Alcohol use: Not Currently           Objective:    Vitals: BP (!) 152/81   Pulse 63   Ht 1.83 m (6' 0.05\")   Wt 125.9 kg (277 lb 9.6 oz)   SpO2 98%   BMI 37.60 kg/m      Physical Exam:  General: Awake and alert. No acute distress.  Skin: Warm and dry. Appropriate color. No lesions or rashes noted.  HEENT: Normocephalic and atraumatic. EOM intact. PERRL. Trachea midline. Right-sided JVD: none.  No carotid bruit noted bilaterally.  Cardiac: Normal S1 and S2, no murmurs, rubs, or gallops noted. Regular rate and rhythm .  Lung: Regular work of breathing without accessory muscle use. Clear to auscultation bilaterally.  Abdomen: No distension.  Extremities: Bilateral lower extremity edema: none.  Neuro: A & O. No focal deficits noted. Moves all extremities appropriately.      CURRENT MEDICATIONS:  Current Outpatient Medications   Medication Sig Dispense Refill    albuterol (PROAIR HFA/PROVENTIL HFA/VENTOLIN HFA) 108 (90 Base) MCG/ACT inhaler Inhale 2 puffs into the lungs every 6 hours as needed for shortness of breath, wheezing or cough. 18 g 1    apixaban ANTICOAGULANT (ELIQUIS) 5 MG tablet Take 1 tablet (5 mg) by mouth 2 times daily. 30 tablet 1    blood glucose (NO BRAND SPECIFIED) test strip Use to test blood sugar 1 times daily or as directed. To accompany: Blood Glucose Monitor Brands: per insurance. 100 strip 6    blood glucose monitoring (NO BRAND SPECIFIED) meter device kit Use to test blood sugar 1 times daily or as directed. Preferred blood glucose meter OR supplies to accompany: Blood " Glucose Monitor Brands: per insurance. 1 kit 0    buprenorphine HCl-naloxone HCl (SUBOXONE) 8-2 MG per film PLACE 1 FILM UNDER THE TONGUE THREE TIMES DAILY 90 Film 2    hydrocortisone 2.5 % cream Apply topically 2 times daily. When rash is flared only then stop 30 g 2    ketoconazole (NIZORAL) 2 % external cream Apply daily to skin folds where rash is prone 60 g 11    lisinopril (ZESTRIL) 40 MG tablet Take 1 tablet (40 mg) by mouth daily. 90 tablet 0    metFORMIN (GLUCOPHAGE XR) 500 MG 24 hr tablet Take 2 tablets (1,000 mg) by mouth 2 times daily (with meals). 360 tablet 1    metoprolol succinate ER (TOPROL XL) 50 MG 24 hr tablet Take 1 tablet (50 mg) by mouth daily. 30 tablet 1    thin (NO BRAND SPECIFIED) lancets Use with lanceting device. To accompany: Blood Glucose Monitor Brands: per insurance. 100 each 6    traZODone (DESYREL) 50 MG tablet Take 1 tablet (50 mg) by mouth at bedtime. 30 tablet 2       ALLERGIES:  No Known Allergies      PAST MEDICAL HISTORY:  Past Medical History:   Diagnosis Date    Malignant melanoma (H)        PAST SURGICAL HISTORY:  History reviewed. No pertinent surgical history.    FAMILY HISTORY:  Family History   Problem Relation Age of Onset    No Known Problems Mother     No Known Problems Father        SOCIAL HISTORY:  Social History     Socioeconomic History    Marital status:      Spouse name: None    Number of children: None    Years of education: None    Highest education level: None   Tobacco Use    Smoking status: Former     Current packs/day: 0.00     Types: Cigarettes     Quit date: 10/1/2021     Years since quitting: 3.4     Passive exposure: Past    Smokeless tobacco: Current     Types: Chew   Vaping Use    Vaping status: Never Used   Substance and Sexual Activity    Alcohol use: Not Currently     Social Drivers of Health     Financial Resource Strain: Low Risk  (4/18/2024)    Financial Resource Strain     Within the past 12 months, have you or your family members you  live with been unable to get utilities (heat, electricity) when it was really needed?: No   Food Insecurity: Low Risk  (4/18/2024)    Food Insecurity     Within the past 12 months, did you worry that your food would run out before you got money to buy more?: No     Within the past 12 months, did the food you bought just not last and you didn t have money to get more?: No   Transportation Needs: Low Risk  (4/18/2024)    Transportation Needs     Within the past 12 months, has lack of transportation kept you from medical appointments, getting your medicines, non-medical meetings or appointments, work, or from getting things that you need?: No   Physical Activity: Unknown (4/18/2024)    Exercise Vital Sign     Days of Exercise per Week: 1 day   Stress: Stress Concern Present (4/18/2024)    Citizen of [place] of Occupational Health - Occupational Stress Questionnaire     Feeling of Stress : To some extent   Social Connections: Unknown (4/18/2024)    Social Connection and Isolation Panel [NHANES]     Frequency of Social Gatherings with Friends and Family: Once a week   Interpersonal Safety: Low Risk  (2/3/2025)    Interpersonal Safety     Do you feel physically and emotionally safe where you currently live?: Yes     Within the past 12 months, have you been hit, slapped, kicked or otherwise physically hurt by someone?: No     Within the past 12 months, have you been humiliated or emotionally abused in other ways by your partner or ex-partner?: No   Housing Stability: Low Risk  (4/18/2024)    Housing Stability     Do you have housing? : Yes     Are you worried about losing your housing?: No       Today's clinic visit entailed:  50 minutes spent by me on the date of the encounter doing chart review, history and exam, documentation and further activities per the note.  The level of medical decision making during this visit was of moderate complexity.     The longitudinal plan of care for the diagnosis(es)/condition(s) as  documented were addressed during this visit. Due to the added complexity in care, I will continue to support Henrique Nathan in the subsequent management and with ongoing continuity of care.         Review of Systems:     Review of Systems:  Skin:        Eyes:       ENT:       Respiratory:  Positive for sleep apnea, CPAP  Cardiovascular:    Positive for, chest pain, fatigue, lightheadedness, dizziness  Gastroenterology:      Genitourinary:       Musculoskeletal:       Neurologic:       Psychiatric:       Heme/Lymph/Imm:       Endocrine:

## 2025-03-03 NOTE — LETTER
3/3/2025    Cory Bond PA-C  [address]  [facility] [address]    RE: Henrique Nathan       Dear Colleague,     I had the pleasure of seeing Henrique Nathan in the [facility].      Electrophysiology Clinic Progress Note  Henrique Nathan MRN# [id number]   YOB: 1960 Age: 64 year old     Primary cardiologist: Dr. Rucker    Reason for visit: Follow up for: Recent Hospitalization- follow up after discharge      Plan:  New paroxysmal atrial fibrillation with RVR  Continue metoprolol XL 50 mg daily and Eliquis  Discussed lifestyle contributors such as caffeine and nicotine with recommendation to limit/reduce intake  Follow-up in 6 months for A-fib and BP check  Hypertension  Seems to have anxiety component with normalization at home following subsequent readings  Discussed diet and lifestyle changes to assist with management of hypertension, along with stress management  Continue to monitor annual lipid panel with recent   Continue lisinopril 40 mg daily      Detailed Assessment:  New paroxysmal atrial fibrillation with RVR  Admitted to [facility] 1/28/2025 - 1/29/2025 for A-fib RVR, converted to NSR spontaneously  Started on metoprolol XL and anticoagulation  Was unable to do 14-day ZIO 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  No A-fib noted max  bpm.  2% supraventricular ectopy burden.  Event button not triggered.  Metoprolol XL 50 mg daily  Eliquis 5 mg twice daily  [id number]: 3 (age, diabetes mellitus, hypertension)  Assessment of CAD risk:  No known family history  Chest CT 9/4/2024- no coronary artery calcification  Lipid panel 4/2024 within normal limits other than   LDL 88 in 2023  Hypertension  Lisinopril 40 mg daily  Mild ascending aorta dilation (4.1 cm)  TTE on 1/29/2025- LVEF 55-60%, normal RV size and function, normal sized atria, no significant valvular disease,  mild ascending aortic dilation (4.1 cm)  Type 2 diabetes mellitus with diabetic nephropathy, uncontrolled  A1c on 24- 7.2%  Mild opioid use disorder  Suboxone  JUAN M  Uses CPAP  Hypersomnia  Anxiety/depression  Previously on treatment for it, patient reports he stopped taking it, but plans to potentially start taking it again due to increased stress and anxiety  Insomnia  Trazodone  History of smoking  History of alcohol use disorder    HPI:  Henrique Nathan is a pleasant 64 year old patient who presents today for hospital follow-up.  He was admitted for A-fib RVR on 2025.  He spontaneously converted to normal sinus rhythm.  He felt fatigued and overall not well with A-fib RVR.  He was recommended for 1 month follow-up with the EP and cardiac monitor.   He overall has been feeling well.  Continues to monitor blood pressure at home.  He reports SBP usually goes down to 120s after taking BP a few times, although starts around 150s. Feels like anxiety plays a role in his initial increased BP.  Reports he was on treatment for anxiety and stopped taking his medication due to thinking it was ineffective.  However, in retrospect he thinks this may have been helping him more than originally thought.  He uses Nicorette gum while trying to quit smoking.  He has history of alcohol use many years ago and does not use alcohol.  He continues to feel tired and reports more frequent frontal headaches, consistent with tension type.  Reports he has been more stressed recently and anxious.  Works in manual labor in construction.  Has been having some mild dizziness that he attributes to his new blood pressure medications.  Reports intermittent, brief episodes of chest pressure/tightness that quickly resolve and are not associated with exertion or other symptoms.  Denies shortness of breath, chest pain, palpitations, edema.  Reports his sister  in her sleep at age 65 from an enlarged heart, thought to be related to an  "untreated sleep disorder.  Denies family history of heart disease or heart attack.      Kasey Novak PA-C   Physician Assistant   [facility]     PAST SURGICAL HISTORY: History reviewed. No pertinent surgical history.    FAMILY HISTORY:   Family History   Problem Relation Age of Onset     No Known Problems Mother      No Known Problems Father        SOCIAL HISTORY:   Social History     Tobacco Use     Smoking status: Former     Current packs/day: 0.00     Types: Cigarettes     Quit date: 10/1/2021     Years since quitting: 3.4     Passive exposure: Past     Smokeless tobacco: Current     Types: Chew   Substance Use Topics     Alcohol use: Not Currently           Objective:    Vitals: BP (!) 152/81   Pulse 63   Ht 1.83 m (6' 0.05\")   Wt 125.9 kg (277 lb 9.6 oz)   SpO2 98%   BMI 37.60 kg/m      Physical Exam:  General: Awake and alert. No acute distress.  Skin: Warm and dry. Appropriate color. No lesions or rashes noted.  HEENT: Normocephalic and atraumatic. EOM intact. PERRL. Trachea midline. Right-sided JVD: none.  No carotid bruit noted bilaterally.  Cardiac: Normal S1 and S2, no murmurs, rubs, or gallops noted. Regular rate and rhythm .  Lung: Regular work of breathing without accessory muscle use. Clear to auscultation bilaterally.  Abdomen: No distension.  Extremities: Bilateral lower extremity edema: none.  Neuro: A & O. No focal deficits noted. Moves all extremities appropriately.      CURRENT MEDICATIONS:  Current Outpatient Medications   Medication Sig Dispense Refill     albuterol (PROAIR HFA/PROVENTIL HFA/VENTOLIN HFA) 108 (90 Base) MCG/ACT inhaler Inhale 2 puffs into the lungs every 6 hours as needed for shortness of breath, wheezing or cough. 18 g 1     apixaban ANTICOAGULANT (ELIQUIS) 5 MG tablet Take 1 tablet (5 mg) by mouth 2 times daily. 30 tablet 1     blood glucose (NO BRAND SPECIFIED) test strip Use to test blood sugar 1 times daily or as directed. To accompany: Blood " Glucose Monitor Brands: per insurance. 100 strip 6     blood glucose monitoring (NO BRAND SPECIFIED) meter device kit Use to test blood sugar 1 times daily or as directed. Preferred blood glucose meter OR supplies to accompany: Blood Glucose Monitor Brands: per insurance. 1 kit 0     buprenorphine HCl-naloxone HCl (SUBOXONE) 8-2 MG per film PLACE 1 FILM UNDER THE TONGUE THREE TIMES DAILY 90 Film 2     hydrocortisone 2.5 % cream Apply topically 2 times daily. When rash is flared only then stop 30 g 2     ketoconazole (NIZORAL) 2 % external cream Apply daily to skin folds where rash is prone 60 g 11     lisinopril (ZESTRIL) 40 MG tablet Take 1 tablet (40 mg) by mouth daily. 90 tablet 0     metFORMIN (GLUCOPHAGE XR) 500 MG 24 hr tablet Take 2 tablets (1,000 mg) by mouth 2 times daily (with meals). 360 tablet 1     metoprolol succinate ER (TOPROL XL) 50 MG 24 hr tablet Take 1 tablet (50 mg) by mouth daily. 30 tablet 1     thin (NO BRAND SPECIFIED) lancets Use with lanceting device. To accompany: Blood Glucose Monitor Brands: per insurance. 100 each 6     traZODone (DESYREL) 50 MG tablet Take 1 tablet (50 mg) by mouth at bedtime. 30 tablet 2       ALLERGIES:  No Known Allergies      PAST MEDICAL HISTORY:  Past Medical History:   Diagnosis Date     Malignant melanoma (H)        PAST SURGICAL HISTORY:  History reviewed. No pertinent surgical history.    FAMILY HISTORY:  Family History   Problem Relation Age of Onset     No Known Problems Mother      No Known Problems Father        SOCIAL HISTORY:  Social History     Socioeconomic History     Marital status:      Spouse name: None     Number of children: None     Years of education: None     Highest education level: None   Tobacco Use     Smoking status: Former     Current packs/day: 0.00     Types: Cigarettes     Quit date: 10/1/2021     Years since quitting: 3.4     Passive exposure: Past     Smokeless tobacco: Current     Types: Chew   Vaping Use     Vaping  status: Never Used   Substance and Sexual Activity     Alcohol use: Not Currently     Social Drivers of Health     Financial Resource Strain: Low Risk  (4/18/2024)    Financial Resource Strain      Within the past 12 months, have you or your family members you live with been unable to get utilities (heat, electricity) when it was really needed?: No   Food Insecurity: Low Risk  (4/18/2024)    Food Insecurity      Within the past 12 months, did you worry that your food would run out before you got money to buy more?: No      Within the past 12 months, did the food you bought just not last and you didn t have money to get more?: No   Transportation Needs: Low Risk  (4/18/2024)    Transportation Needs      Within the past 12 months, has lack of transportation kept you from medical appointments, getting your medicines, non-medical meetings or appointments, work, or from getting things that you need?: No   Physical Activity: Unknown (4/18/2024)    Exercise Vital Sign      Days of Exercise per Week: 1 day   Stress: Stress Concern Present (4/18/2024)    [employer] - Occupational Stress Questionnaire      Feeling of Stress : To some extent   Social Connections: Unknown (4/18/2024)    Social Connection and Isolation Panel [NHANES]      Frequency of Social Gatherings with Friends and Family: Once a week   Interpersonal Safety: Low Risk  (2/3/2025)    Interpersonal Safety      Do you feel physically and emotionally safe where you currently live?: Yes      Within the past 12 months, have you been hit, slapped, kicked or otherwise physically hurt by someone?: No      Within the past 12 months, have you been humiliated or emotionally abused in other ways by your partner or ex-partner?: No   Housing Stability: Low Risk  (4/18/2024)    Housing Stability      Do you have housing? : Yes      Are you worried about losing your housing?: No       Today's clinic visit entailed:  50 minutes spent by me on the  date of the encounter doing chart review, history and exam, documentation and further activities per the note.  The level of medical decision making during this visit was of moderate complexity.     The longitudinal plan of care for the diagnosis(es)/condition(s) as documented were addressed during this visit. Due to the added complexity in care, I will continue to support Henrique Nathan in the subsequent management and with ongoing continuity of care.         Review of Systems:     Review of Systems:  Skin:        Eyes:       ENT:       Respiratory:  Positive for sleep apnea, CPAP  Cardiovascular:    Positive for, chest pain, fatigue, lightheadedness, dizziness  Gastroenterology:      Genitourinary:       Musculoskeletal:       Neurologic:       Psychiatric:       Heme/Lymph/Imm:       Endocrine:                 Thank you for allowing me to participate in the care of your patient.      Sincerely,     Kasey Novak PA-C     [facility]  cc:   Vicki Mcfarland NP  [address] [id number]

## 2025-03-19 ENCOUNTER — PATIENT OUTREACH (OUTPATIENT)
Dept: CARE COORDINATION | Facility: CLINIC | Age: 65
End: 2025-03-19
Payer: COMMERCIAL

## 2025-04-02 ENCOUNTER — PATIENT OUTREACH (OUTPATIENT)
Dept: CARE COORDINATION | Facility: CLINIC | Age: 65
End: 2025-04-02
Payer: COMMERCIAL

## 2025-04-03 ENCOUNTER — ANCILLARY PROCEDURE (OUTPATIENT)
Dept: GENERAL RADIOLOGY | Facility: CLINIC | Age: 65
End: 2025-04-03
Attending: FAMILY MEDICINE
Payer: COMMERCIAL

## 2025-04-03 ENCOUNTER — TELEPHONE (OUTPATIENT)
Dept: FAMILY MEDICINE | Facility: CLINIC | Age: 65
End: 2025-04-03

## 2025-04-03 ENCOUNTER — OFFICE VISIT (OUTPATIENT)
Dept: FAMILY MEDICINE | Facility: CLINIC | Age: 65
End: 2025-04-03
Payer: COMMERCIAL

## 2025-04-03 VITALS
DIASTOLIC BLOOD PRESSURE: 75 MMHG | HEART RATE: 70 BPM | TEMPERATURE: 99.3 F | HEIGHT: 72 IN | SYSTOLIC BLOOD PRESSURE: 153 MMHG | BODY MASS INDEX: 35.89 KG/M2 | RESPIRATION RATE: 22 BRPM | WEIGHT: 265 LBS | OXYGEN SATURATION: 98 %

## 2025-04-03 DIAGNOSIS — R05.1 ACUTE COUGH: ICD-10-CM

## 2025-04-03 DIAGNOSIS — R14.0 BLOATING: Primary | ICD-10-CM

## 2025-04-03 DIAGNOSIS — E11.21 TYPE 2 DIABETES MELLITUS WITH DIABETIC NEPHROPATHY, WITHOUT LONG-TERM CURRENT USE OF INSULIN (H): ICD-10-CM

## 2025-04-03 RX ORDER — METFORMIN HYDROCHLORIDE 500 MG/1
1000 TABLET, EXTENDED RELEASE ORAL DAILY
COMMUNITY
Start: 2025-04-03

## 2025-04-03 ASSESSMENT — PAIN SCALES - GENERAL: PAINLEVEL_OUTOF10: MODERATE PAIN (4)

## 2025-04-03 NOTE — TELEPHONE ENCOUNTER
Denied triage as ongoing thing and nothing that triage could do   Pain has been there for months  Patient requested assistance to schedule to see provider regarding symptoms  Assisted with scheduling for today  Patient verbalized understanding and agrees with plan of care.   Will call back with new or worsening symptoms    Shaina TOMLINSON RN

## 2025-04-03 NOTE — PATIENT INSTRUCTIONS
"ASSESSMENT AND PLAN  1. Bloating (Primary)  For 3-4 months at least.  After discussion today does also wonder if started after increased metformin.  In fact symptoms increased in evening after second metformin dose.  No constipation, no diarrhea.     For now plan to stop second metformin dose.  A1c was 7.1 when increased so if manages diet and exercise well may not need additional medication but there are several good options if he does. Follow up three months for diabetes visit/A1c planned.     In meantime please follow up /call [place] [facility] to determine when next colonoscopy is recommended since we did not get records from your reported 2022 colonoscopy which was a one year follow up for prior colonoscopy/polyps.  If you are due for colonoscopy and need updated referral please let us know.     Declining immunizations reviewed today.     2. Acute cough  Several weeks ago with acute respiratory illness which has mostly resolved.  Still lungs \"feel like bricks\"  hard to get full breath.  No crackles/wheezing on exam today.  Will obtain cxray for reassurance hopefully.  Reviewed normal lung ct scan last September.   - XR Chest 2 Views; Future    3. Type 2 diabetes mellitus with diabetic nephropathy, without long-term current use of insulin (H)  As above.               "

## 2025-04-03 NOTE — TELEPHONE ENCOUNTER
Reason for Call:  Appointment Request    Patient requesting this type of appt:  Pain in left side and stomach issues with meds     Requested provider: Cory Bond or anyone     Reason patient unable to be scheduled: Not within requested timeframe    When does patient want to be seen/preferred time: 3-7 days    Comments: He is having side pain and stomach issues with some of his meds. Didn't want a nurse as this is an ongoing issue he is just looking to get in     Could we send this information to you in [facility] or would you prefer to receive a phone call?:   Patient would prefer a phone call   Okay to leave a detailed message?: Yes at Cell number on file:    Telephone Information:   Mobile [phone]       Call taken on 4/3/2025 at 8:39 AM by Marichuy Briggs

## 2025-04-03 NOTE — PROGRESS NOTES
"  ASSESSMENT AND PLAN  1. Bloating (Primary)  For 3-4 months at least.  After discussion today does also wonder if started after increased metformin.  In fact symptoms increased in evening after second metformin dose.  No constipation, no diarrhea.     For now plan to stop second metformin dose.  A1c was 7.1 when increased so if manages diet and exercise well may not need additional medication but there are several good options if he does. Follow up three months for diabetes visit/A1c planned.     In meantime please follow up /call [place] [facility] to determine when next colonoscopy is recommended since we did not get records from your reported 2022 colonoscopy which was a one year follow up for prior colonoscopy/polyps.  If you are due for colonoscopy and need updated referral please let us know.     Declining immunizations reviewed today.     2. Acute cough  Several weeks ago with acute respiratory illness which has mostly resolved.  Still lungs \"feel like bricks\"  hard to get full breath.  No crackles/wheezing on exam today.  Will obtain cxray for reassurance hopefully.  Reviewed normal lung ct scan last September.   - XR Chest 2 Views; Future    3. Type 2 diabetes mellitus with diabetic nephropathy, without long-term current use of insulin (H)  As above.       4.  Htn:  re-check today follow up as above.       33 minutes spent on the date of the encounter doing chart review, history and exam, negotiating and explaining the plan with the patient, documentation and further activities as noted above.          Latrice Allan is a 64 year old, presenting for the following health issues:  Abdominal Pain        4/3/2025     2:05 PM   Additional Questions   Roomed by Dennis KULKARNI     History of Present Illness       Reason for visit:  Stomach and breathing issues  Symptom onset:  3-4 weeks ago  Symptoms include:  Breathing issue lung pain stomach problems  Symptom intensity:  Moderate  Symptom progression:  Staying the " same  Had these symptoms before:  Yes  Has tried/received treatment for these symptoms:  Yes  Previous treatment was successful:  No  What makes it worse:  Eating  What makes it better:  Better sleep   He is taking medications regularly.      Additional Concern: Recent illness. Sx declining, but still present. Onset about 2 weeks ago                  Objective    BP (!) 153/75   Pulse 70   Temp 99.3  F (37.4  C) (Temporal)   Resp 22   Ht 1.829 m (6')   Wt 120.2 kg (265 lb)   SpO2 98%   BMI 35.94 kg/m    Body mass index is 35.94 kg/m .  Physical Exam   Appears well.     No m/g/rubs  Lungs with decent air movement, clear.  No crackles, ronchi or wheezes.         Signed Electronically by: Joel Daniel Wegener, MD

## 2025-04-14 DIAGNOSIS — F11.20 OPIOID USE DISORDER, SEVERE, ON MAINTENANCE THERAPY (H): ICD-10-CM

## 2025-04-14 DIAGNOSIS — I10 HYPERTENSION, UNSPECIFIED TYPE: ICD-10-CM

## 2025-04-14 NOTE — TELEPHONE ENCOUNTER
JS,     Pt requests refills for Metoprolol and Suboxone prescriptions.   Last OV with JW on 04/03/25 for Bloating and Acute Cough.   Last OV with PCP on 10/10/24.   Med orders pended for your review and approval if appropriate.   Please advise.     Thank you,   Keila IRENE RN

## 2025-04-16 RX ORDER — BUPRENORPHINE AND NALOXONE 8; 2 MG/1; MG/1
FILM, SOLUBLE BUCCAL; SUBLINGUAL
Qty: 90 FILM | Refills: 2 | Status: SHIPPED | OUTPATIENT
Start: 2025-04-16

## 2025-04-16 RX ORDER — METOPROLOL SUCCINATE 50 MG/1
50 TABLET, EXTENDED RELEASE ORAL DAILY
Qty: 30 TABLET | Refills: 1 | Status: SHIPPED | OUTPATIENT
Start: 2025-04-16

## 2025-05-04 ENCOUNTER — HEALTH MAINTENANCE LETTER (OUTPATIENT)
Age: 65
End: 2025-05-04

## 2025-05-12 DIAGNOSIS — G47.00 INSOMNIA, UNSPECIFIED TYPE: ICD-10-CM

## 2025-05-12 RX ORDER — TRAZODONE HYDROCHLORIDE 50 MG/1
50 TABLET ORAL AT BEDTIME
Qty: 90 TABLET | Refills: 2 | Status: SHIPPED | OUTPATIENT
Start: 2025-05-12

## 2025-06-05 ENCOUNTER — OFFICE VISIT (OUTPATIENT)
Dept: FAMILY MEDICINE | Facility: CLINIC | Age: 65
End: 2025-06-05
Payer: COMMERCIAL

## 2025-06-05 VITALS
SYSTOLIC BLOOD PRESSURE: 139 MMHG | DIASTOLIC BLOOD PRESSURE: 80 MMHG | HEART RATE: 56 BPM | WEIGHT: 260.1 LBS | TEMPERATURE: 98.2 F | OXYGEN SATURATION: 96 % | BODY MASS INDEX: 34.47 KG/M2 | HEIGHT: 73 IN | RESPIRATION RATE: 18 BRPM

## 2025-06-05 DIAGNOSIS — I10 HYPERTENSION, UNSPECIFIED TYPE: ICD-10-CM

## 2025-06-05 DIAGNOSIS — Z12.5 SCREENING FOR PROSTATE CANCER: ICD-10-CM

## 2025-06-05 DIAGNOSIS — Z87.891 HX OF TOBACCO USE, PRESENTING HAZARDS TO HEALTH: ICD-10-CM

## 2025-06-05 DIAGNOSIS — Z13.6 SCREENING FOR CARDIOVASCULAR CONDITION: ICD-10-CM

## 2025-06-05 DIAGNOSIS — E11.21 TYPE 2 DIABETES MELLITUS WITH DIABETIC NEPHROPATHY, WITHOUT LONG-TERM CURRENT USE OF INSULIN (H): ICD-10-CM

## 2025-06-05 DIAGNOSIS — Z13.6 SCREENING FOR AAA (ABDOMINAL AORTIC ANEURYSM): ICD-10-CM

## 2025-06-05 DIAGNOSIS — Z00.00 ENCOUNTER FOR MEDICARE ANNUAL WELLNESS EXAM: Primary | ICD-10-CM

## 2025-06-05 DIAGNOSIS — H91.93 DECREASED HEARING OF BOTH EARS: ICD-10-CM

## 2025-06-05 DIAGNOSIS — R10.32 ABDOMINAL PAIN, LEFT LOWER QUADRANT: ICD-10-CM

## 2025-06-05 DIAGNOSIS — I48.91 ATRIAL FIBRILLATION WITH RAPID VENTRICULAR RESPONSE (H): ICD-10-CM

## 2025-06-05 LAB
EST. AVERAGE GLUCOSE BLD GHB EST-MCNC: 148 MG/DL
HBA1C MFR BLD: 6.8 % (ref 0–5.6)

## 2025-06-05 RX ORDER — METOPROLOL SUCCINATE 50 MG/1
50 TABLET, EXTENDED RELEASE ORAL DAILY
Qty: 90 TABLET | Refills: 2 | Status: SHIPPED | OUTPATIENT
Start: 2025-06-05

## 2025-06-05 RX ORDER — METFORMIN HYDROCHLORIDE 500 MG/1
1000 TABLET, EXTENDED RELEASE ORAL DAILY
Qty: 180 TABLET | Refills: 2 | Status: SHIPPED | OUTPATIENT
Start: 2025-06-05

## 2025-06-05 SDOH — HEALTH STABILITY: PHYSICAL HEALTH: ON AVERAGE, HOW MANY DAYS PER WEEK DO YOU ENGAGE IN MODERATE TO STRENUOUS EXERCISE (LIKE A BRISK WALK)?: 0 DAYS

## 2025-06-05 ASSESSMENT — SOCIAL DETERMINANTS OF HEALTH (SDOH): HOW OFTEN DO YOU GET TOGETHER WITH FRIENDS OR RELATIVES?: ONCE A WEEK

## 2025-06-05 ASSESSMENT — PAIN SCALES - GENERAL: PAINLEVEL_OUTOF10: NO PAIN (0)

## 2025-06-05 NOTE — PATIENT INSTRUCTIONS
Patient Education   Preventive Care Advice   This is general advice given by our system to help you stay healthy. However, your care team may have specific advice just for you. Please talk to your care team about your preventive care needs.  Nutrition  Eat 5 or more servings of fruits and vegetables each day.  Try wheat bread, brown rice and whole grain pasta (instead of white bread, rice, and pasta).  Get enough calcium and vitamin D. Check the label on foods and aim for 100% of the RDA (recommended daily allowance).  Lifestyle  Exercise at least 150 minutes each week  (30 minutes a day, 5 days a week).  Do muscle strengthening activities 2 days a week. These help control your weight and prevent disease.  No smoking.  Wear sunscreen to prevent skin cancer.  Have a dental exam and cleaning every 6 months.  Yearly exams  See your health care team every year to talk about:  Any changes in your health.  Any medicines your care team has prescribed.  Preventive care, family planning, and ways to prevent chronic diseases.  Shots (vaccines)   HPV shots (up to age 26), if you've never had them before.  Hepatitis B shots (up to age 59), if you've never had them before.  COVID-19 shot: Get this shot when it's due.  Flu shot: Get a flu shot every year.  Tetanus shot: Get a tetanus shot every 10 years.  Pneumococcal, hepatitis A, and RSV shots: Ask your care team if you need these based on your risk.  Shingles shot (for age 50 and up)  General health tests  Diabetes screening:  Starting at age 35, Get screened for diabetes at least every 3 years.  If you are younger than age 35, ask your care team if you should be screened for diabetes.  Cholesterol test: At age 39, start having a cholesterol test every 5 years, or more often if advised.  Bone density scan (DEXA): At age 50, ask your care team if you should have this scan for osteoporosis (brittle bones).  Hepatitis C: Get tested at least once in your life.  STIs (sexually  transmitted infections)  Before age 24: Ask your care team if you should be screened for STIs.  After age 24: Get screened for STIs if you're at risk. You are at risk for STIs (including HIV) if:  You are sexually active with more than one person.  You don't use condoms every time.  You or a partner was diagnosed with a sexually transmitted infection.  If you are at risk for HIV, ask about PrEP medicine to prevent HIV.  Get tested for HIV at least once in your life, whether you are at risk for HIV or not.  Cancer screening tests  Cervical cancer screening: If you have a cervix, begin getting regular cervical cancer screening tests starting at age 21.  Breast cancer scan (mammogram): If you've ever had breasts, begin having regular mammograms starting at age 40. This is a scan to check for breast cancer.  Colon cancer screening: It is important to start screening for colon cancer at age 45.  Have a colonoscopy test every 10 years (or more often if you're at risk) Or, ask your provider about stool tests like a FIT test every year or Cologuard test every 3 years.  To learn more about your testing options, visit:   .  For help making a decision, visit:   https://bit.ly/is49365.  Prostate cancer screening test: If you have a prostate, ask your care team if a prostate cancer screening test (PSA) at age 55 is right for you.  Lung cancer screening: If you are a current or former smoker ages 50 to 80, ask your care team if ongoing lung cancer screenings are right for you.  For informational purposes only. Not to replace the advice of your health care provider. Copyright   2023 [facility]. All rights reserved. Clinically reviewed by the [facility]. [employer] 715743 - REV 01/24.  Preventing Falls: Care Instructions  Injuries and health problems such as trouble walking or poor eyesight can increase your risk of falling. So can some medicines. But there are things you can do to help  "prevent falls. You can exercise to get stronger. You can also arrange your home to make it safer.    Talk to your doctor about the medicines you take. Ask if any of them increase the risk of falls and whether they can be changed or stopped.   Try to exercise regularly. It can help improve your strength and balance. This can help lower your risk of falling.         Practice fall safety and prevention.   Wear low-heeled shoes that fit well and give your feet good support. Talk to your doctor if you have foot problems that make this hard.  Carry a cellphone or wear a medical alert device that you can use to call for help.  Use stepladders instead of chairs to reach high objects. Don't climb if you're at risk for falls. Ask for help, if needed.  Wear the correct eyeglasses, if you need them.        Make your home safer.   Remove rugs, cords, clutter, and furniture from walkways.  Keep your house well lit. Use night-lights in hallways and bathrooms.  Install and use sturdy handrails on stairways.  Wear nonskid footwear, even inside. Don't walk barefoot or in socks without shoes.        Be safe outside.   Use handrails, curb cuts, and ramps whenever possible.  Keep your hands free by using a shoulder bag or backpack.  Try to walk in well-lit areas. Watch out for uneven ground, changes in pavement, and debris.  Be careful in the winter. Walk on the grass or gravel when sidewalks are slippery. Use de-icer on steps and walkways. Add non-slip devices to shoes.    Put grab bars and nonskid mats in your shower or tub and near the toilet. Try to use a shower chair or bath bench when bathing.   Get into a tub or shower by putting in your weaker leg first. Get out with your strong side first. Have a phone or medical alert device in the bathroom with you.   Where can you learn more?  Go to https://www."[employer]wise.net/patiented  Enter G117 in the search box to learn more about \"Preventing Falls: Care Instructions.\"  Current as of: " July 31, 2024  Content Version: 14.4    7097-4347 [employer].   Care instructions adapted under license by your healthcare professional. If you have questions about a medical condition or this instruction, always ask your healthcare professional. [employer] disclaims any warranty or liability for your use of this information.    Hearing Loss: Care Instructions  Overview     Hearing loss is a sudden or slow decrease in how well you hear. It can range from slight to profound. Permanent hearing loss can occur with aging. It also can happen when you are exposed long-term to loud noise. Examples include listening to loud music, riding motorcycles, or being around other loud machines.  Hearing loss can affect your work and home life. It can make you feel lonely or depressed. You may feel that you have lost your independence. But hearing aids and other devices can help you hear better and feel connected to others.  Follow-up care is a key part of your treatment and safety. Be sure to make and go to all appointments, and call your doctor if you are having problems. It's also a good idea to know your test results and keep a list of the medicines you take.  How can you care for yourself at home?  Avoid loud noises whenever possible. This helps keep your hearing from getting worse.  Always wear hearing protection around loud noises.  Wear a hearing aid as directed.  A professional can help you pick a hearing aid that will work best for you.  You can also get hearing aids over the counter for mild to moderate hearing loss.  Have hearing tests as your doctor suggests. They can show whether your hearing has changed. Your hearing aid may need to be adjusted.  Use other devices as needed. These may include:  Telephone amplifiers and hearing aids that can connect to a television, stereo, radio, or microphone.  Devices that use lights or vibrations. These alert you to the doorbell, a ringing telephone, or a  "baby monitor.  Television closed-captioning. This shows the words at the bottom of the screen. Most new TVs can do this.  TTY (text telephone). This lets you type messages back and forth on the telephone instead of talking or listening. These devices are also called TDD. When messages are typed on the keyboard, they are sent over the phone line to a receiving TTY. The message is shown on a monitor.  Use text messaging, social media, and email if it is hard for you to communicate by telephone.  Try to learn a listening technique called speechreading. It is not lipreading. You pay attention to people's gestures, expressions, posture, and tone of voice. These clues can help you understand what a person is saying. Face the person you are talking to, and have them face you. Make sure the lighting is good. You need to see the other person's face clearly.  Think about counseling if you need help to adjust to your hearing loss.  When should you call for help?  Watch closely for changes in your health, and be sure to contact your doctor if:    You think your hearing is getting worse.     You have new symptoms, such as dizziness or nausea.   Where can you learn more?  Go to https://www.[employer].net/patiented  Enter R798 in the search box to learn more about \"Hearing Loss: Care Instructions.\"  Current as of: October 27, 2024  Content Version: 14.4    8615-0538 [employer].   Care instructions adapted under license by your healthcare professional. If you have questions about a medical condition or this instruction, always ask your healthcare professional. [employer] disclaims any warranty or liability for your use of this information.    Learning About Stress  What is stress?     Stress is your body's response to a hard situation. Your body can have a physical, emotional, or mental response. Stress is a fact of life for most people, and it affects everyone differently. What causes stress for you may not " be stressful for someone else.  A lot of things can cause stress. You may feel stress when you go on a job interview, take a test, or run a race. This kind of short-term stress is normal and even useful. It can help you if you need to work hard or react quickly. For example, stress can help you finish an important job on time.  Long-term stress is caused by ongoing stressful situations or events. Examples of long-term stress include long-term health problems, ongoing problems at work, or conflicts in your family. Long-term stress can harm your health.  How does stress affect your health?  When you are stressed, your body responds as though you are in danger. It makes hormones that speed up your heart, make you breathe faster, and give you a burst of energy. This is called the fight-or-flight stress response. If the stress is over quickly, your body goes back to normal and no harm is done.  But if stress happens too often or lasts too long, it can have bad effects. Long-term stress can make you more likely to get sick, and it can make symptoms of some diseases worse. If you tense up when you are stressed, you may develop neck, shoulder, or low back pain. Stress is linked to high blood pressure and heart disease.  Stress also harms your emotional health. It can make you camilo, tense, or depressed. Your relationships may suffer, and you may not do well at work or school.  What can you do to manage stress?  You can try these things to help manage stress:   Do something active. Exercise or activity can help reduce stress. Walking is a great way to get started. Even everyday activities such as housecleaning or yard work can help.  Try yoga or erick chi. These techniques combine exercise and meditation. You may need some training at first to learn them.  Do something you enjoy. For example, listen to music or go to a movie. Practice your hobby or do volunteer work.  Meditate. This can help you relax, because you are not  "worrying about what happened before or what may happen in the future.  Do guided imagery. Imagine yourself in any setting that helps you feel calm. You can use online videos, books, or a teacher to guide you.  Do breathing exercises. For example:  From a standing position, bend forward from the waist with your knees slightly bent. Let your arms dangle close to the floor.  Breathe in slowly and deeply as you return to a standing position. Roll up slowly and lift your head last.  Hold your breath for just a few seconds in the standing position.  Breathe out slowly and bend forward from the waist.  Let your feelings out. Talk, laugh, cry, and express anger when you need to. Talking with supportive friends or family, a counselor, or a neelam leader about your feelings is a healthy way to relieve stress. Avoid discussing your feelings with people who make you feel worse.  Write. It may help to write about things that are bothering you. This helps you find out how much stress you feel and what is causing it. When you know this, you can find better ways to cope.  What can you do to prevent stress?  You might try some of these things to help prevent stress:  Manage your time. This helps you find time to do the things you want and need to do.  Get enough sleep. Your body recovers from the stresses of the day while you are sleeping.  Get support. Your family, friends, and community can make a difference in how you experience stress.  Limit your news feed. Avoid or limit time on social media or news that may make you feel stressed.  Do something active. Exercise or activity can help reduce stress. Walking is a great way to get started.  Where can you learn more?  Go to https://www.[employer].net/patiented  Enter N032 in the search box to learn more about \"Learning About Stress.\"  Current as of: October 24, 2024  Content Version: 14.4    8895-4768 [facility] [employer].   Care instructions adapted under license by your " healthcare professional. If you have questions about a medical condition or this instruction, always ask your healthcare professional. [employer] disclaims any warranty or liability for your use of this information.

## 2025-06-05 NOTE — PROGRESS NOTES
Preventive Care Visit  [facility]  Bri Chilel MD, Family Medicine  Jun 5, 2025      Assessment & Plan     Encounter for Medicare annual wellness exam  Reviewed routine well adult screenings encouraged eye exam and vaccines patient will getPneumococcal vaccine today.    Type 2 diabetes mellitus with diabetic nephropathy, without long-term current use of insulin (H)  Labs as noted below encouraged eye exam  - Lipid panel reflex to direct LDL Non-fasting; Future  - Albumin Random Urine Quantitative with Creat Ratio; Future  - HEMOGLOBIN A1C; Future  - Adult Eye  Referral; Future  - metFORMIN (GLUCOPHAGE XR) 500 MG 24 hr tablet; Take 2 tablets (1,000 mg) by mouth daily.  - Comprehensive metabolic panel (BMP + Alb, Alk Phos, ALT, AST, Total. Bili, TP); Future  - Lipid panel reflex to direct LDL Non-fasting  - Albumin Random Urine Quantitative with Creat Ratio  - HEMOGLOBIN A1C  - Comprehensive metabolic panel (BMP + Alb, Alk Phos, ALT, AST, Total. Bili, TP)    Atrial fibrillation with rapid ventricular response (H)  Patient is anticoagulated and has good rate control with medications.  Stable    Screening for AAA (abdominal aortic aneurysm)  Discussed history of tobacco use for many years and family history of aneurysm is good reasons for screening place an order for this.    Abdominal pain, left lower quadrant  X-ray does show significant dilated loops of bowel does not look like there is obstruction but lots of bowel gas and stool will encourage patient to use MiraLAX and bisacodyl for a mini cleanout and see how his symptoms feel  - XR Abdomen 2 Views; Future    Screening for cardiovascular condition      Hypertension, unspecified type  Blood pressure is stable tolerating medications well  - metoprolol succinate ER (TOPROL XL) 50 MG 24 hr tablet; Take 1 tablet (50 mg) by mouth daily.  - Comprehensive metabolic panel (BMP + Alb, Alk Phos, ALT, AST, Total. Bili, TP); Future  -  "Comprehensive metabolic panel (BMP + Alb, Alk Phos, ALT, AST, Total. Bili, TP)    Hx of tobacco use, presenting hazards to health    - US Abdominal Aorta; Future    Decreased hearing of both ears    - Adult Audiology  Referral; Future    Screening for prostate cancer    - PSA, screen; Future  - PSA, screen    Patient has been advised of split billing requirements and indicates understanding: Yes        BMI  Estimated body mass index is 34.55 kg/m  as calculated from the following:    Height as of this encounter: 1.848 m (6' 0.75\").    Weight as of this encounter: 118 kg (260 lb 1.6 oz).       Counseling  Appropriate preventive services were addressed with this patient via screening, questionnaire, or discussion as appropriate for fall prevention, nutrition, physical activity, Tobacco-use cessation, social engagement, weight loss and cognition.  Checklist reviewing preventive services available has been given to the patient.  Reviewed patient's diet, addressing concerns and/or questions.   The patient was instructed to see the dentist every 6 months.   He is at risk for psychosocial distress and has been provided with information to reduce risk.   The patient was provided with written information regarding signs of hearing loss.           Subjective   Jerrell is a 64 year old, presenting for the following:  Physical (AWV)        6/5/2025     2:11 PM   Additional Questions   Roomed by Danae CARL        Vision Screening: Right eye 20/40, Left eye 20/25.     HPI     Wellness Visit Notes:     -Colon Cancer Screening: Last done via colonoscopy on 5/2022. (Impression: Results not available in EPIC. Repeat 5 years per Dr Waldrop). Due 5/2027.   -PSA: Last done 4/18/24 (Result: 0.72). Discussed risks/benefits of PSA testing today in detail, including possibility of false positive results and/or possibility of biopsy recommended as next step. Patient requesting PSA lab work.    -Lung Cancer Screening: Patient has not " "completed. Provider to complete initial risk versus benefit discussion. Smoking history updated/confirmed today.  Patient completed Chest CT 9/2024.    -Aortic Aneurysm Screening: Patient qualifies due to smoking history. No family history of Aortic Aneurysms, but father did have aneurysm in \"hip area\"   Patient completed CT Abdomen Pelvis, Provider to review if AAA screening needs to be completed.     -Dermatology: Pt verbalized they do meet with dermatology regularly. .    -Diabetic Foot Exam: Provider to complete.  -Eye Exam: Last done 9/2023. Patient is due for diabetic eye exam. Pt requesting eye referral.   -A1C: Last done 2/2025 . (Result: 7.1) Due today    -Immunizations: Patient is due/able to receive at clinic today: Covid - Declines and Pneumococcal - Provider to discuss   Patient is due for the following vaccines: Shingles, RSV, Tdap/Td, and Hepatitis A. Advised patient to receive at a pharmacy due to Medicare insurance coverage.       Patient concerns:  -Stomach issues - pressure after eating, feeling full, waking up at night with \"active stomach\", no diarrhea, no blood in stool, ?Colonoscopy   Gets better with a BM  Wakes up and feels it at times feels it is moving growling  Feels really full gas and pressure  Stools are not constipated has daily BM  Has had some issues with this in the past  -Hearing troubles, Audiology referral                Advance Care Planning    Discussed advance care planning with patient; informed AVS has link to [facility].        6/5/2025   General Health   How would you rate your overall physical health? (!) FAIR   Feel stress (tense, anxious, or unable to sleep) Rather much   (!) STRESS CONCERN      6/5/2025   Nutrition   Diet: Diabetic         6/5/2025   Exercise   Days per week of moderate/strenous exercise 0 days   (!) EXERCISE CONCERN      6/5/2025   Social Factors   Frequency of gathering with friends or relatives Once a week   Worry food won't last until get " money to buy more No   Food not last or not have enough money for food? No   Do you have housing? (Housing is defined as stable permanent housing and does not include staying outside in a car, in a tent, in an abandoned building, in an overnight shelter, or couch-surfing.) Yes   Are you worried about losing your housing? No   Lack of transportation? No   Unable to get utilities (heat,electricity)? No         6/5/2025   Fall Risk   Fallen 2 or more times in the past year? No   Trouble with walking or balance? Yes   Gait Speed Test (Document in seconds) 4   Gait Speed Test Interpretation Less than or equal to 5.00 seconds - PASS          6/5/2025   Activities of Daily Living- Home Safety   Needs help with the following daily activites None of the above   Safety concerns in the home None of the above         6/5/2025   Dental   Dentist two times every year? (!) NO         6/5/2025   Hearing Screening   Hearing concerns? (!) IT'S HARD TO FOLLOW A CONVERSATION IN A NOISY RESTAURANT OR CROWDED ROOM.         6/5/2025   Driving Risk Screening   Patient/family members have concerns about driving No         6/5/2025   General Alertness/Fatigue Screening   Have you been more tired than usual lately? No         6/5/2025   Urinary Incontinence Screening   Bothered by leaking urine in past 6 months No         Today's PHQ-2 Score:       6/5/2025     2:04 PM   PHQ-2 ( 1999 [employer])   Q1: Little interest or pleasure in doing things 0   Q2: Feeling down, depressed or hopeless 1   PHQ-2 Score 1    Q1: Little interest or pleasure in doing things Not at all   Q2: Feeling down, depressed or hopeless Several days   PHQ-2 Score 1       Patient-reported           6/5/2025   Substance Use   Alcohol more than 3/day or more than 7/wk Not Applicable   Do you have a current opioid prescription? No   How severe/bad is pain from 1 to 10? 4/10   Do you use any other substances recreationally? No     Social History     Tobacco Use    Smoking status:  Former     Current packs/day: 0.00     Average packs/day: 1 pack/day for 46.7 years (46.7 ttl pk-yrs)     Types: Cigarettes     Start date:      Quit date: 2024     Years since quittin.7     Passive exposure: Past    Smokeless tobacco: Current     Types: Chew    Tobacco comments:     Started around age 17. Off/On for 40 years, Ten year break. Average 1 pack/day at most.    Vaping Use    Vaping status: Never Used   Substance Use Topics    Alcohol use: Not Currently       Last PSA:   Prostate Specific Antigen Screen   Date Value Ref Range Status   2024 0.72 0.00 - 4.50 ng/mL Final     ASCVD Risk   The 10-year ASCVD risk score (Whit RENEE, et al., 2019) is: 25.9%    Values used to calculate the score:      Age: 64 years      Sex: Male      Is Non- : No      Diabetic: Yes      Tobacco smoker: No      Systolic Blood Pressure: 139 mmHg      Is BP treated: Yes      HDL Cholesterol: 57 mg/dL      Total Cholesterol: 175 mg/dL            Reviewed and updated as needed this visit by Provider                      Current providers sharing in care for this patient include:  Patient Care Team:  Cory Bond PA-C as PCP - General (Family Medicine)  Cory Bond PA-C as Assigned PCP  Vicki Lemos APRN CNP as Nurse Practitioner (Dermatology)  Manuel Thomas DPM as Assigned Musculoskeletal Provider  Jose A Wray MD as Physician (Ophthalmology)  Jose A Wray MD as Physician (Ophthalmology)  Vicki Lemos APRN CNP as Assigned Dermatology Provider  Kasey Novak PA-C as Assigned Heart and Vascular Provider    The following health maintenance items are reviewed in Epic and correct as of today:  Health Maintenance   Topic Date Due    HEPATITIS A VACCINE (1 of 2 - Risk 2-dose series) Never done    ZOSTER VACCINE (1 of 2) Never done    RSV VACCINE (1 - Risk 60-74 years 1-dose series) Never done    DTAP/TDAP/TD VACCINE  "(2 - Td or Tdap) 03/26/2023    COVID-19 VACCINE (2 - 2024-25 season) 09/01/2024    AORTIC ANEURYSM SCREENING (SYSTEM ASSIGNED)  09/16/2024    EYE EXAM  09/19/2024    LIPID  04/18/2025    MICROALBUMIN  04/18/2025    DIABETIC FOOT EXAM  04/18/2025    INFLUENZA VACCINE (Season Ended) 09/01/2025    LUNG CANCER SCREENING  09/04/2025    A1C  09/05/2025    BMP  02/03/2026    ANNUAL REVIEW OF HM ORDERS  02/03/2026    MEDICARE ANNUAL WELLNESS VISIT  06/05/2026    FALL RISK ASSESSMENT  06/05/2026    COLORECTAL CANCER SCREENING  02/22/2027    ADVANCE CARE PLANNING  06/05/2030    HEPATITIS C SCREENING  Completed    HIV SCREENING  Completed    PHQ-2 (once per calendar year)  Completed    PNEUMOCOCCAL VACCINE 50+ YEARS  Completed    HPV VACCINE  Aged Out    MENINGITIS VACCINE  Aged Out         Review of Systems  Constitutional, HEENT, cardiovascular, pulmonary, gi and gu systems are negative, except as otherwise noted.     Objective    Exam  /80   Pulse 56   Temp 98.2  F (36.8  C) (Temporal)   Resp 18   Ht 1.848 m (6' 0.75\")   Wt 118 kg (260 lb 1.6 oz)   SpO2 96%   BMI 34.55 kg/m     Estimated body mass index is 34.55 kg/m  as calculated from the following:    Height as of this encounter: 1.848 m (6' 0.75\").    Weight as of this encounter: 118 kg (260 lb 1.6 oz).    Physical Exam  GENERAL: alert and no distress  EYES: Eyes grossly normal to inspection, PERRL and conjunctivae and sclerae normal  HENT: ear canals and TM's normal, nose and mouth without ulcers or lesions  NECK: no adenopathy, no asymmetry, masses, or scars  RESP: lungs clear to auscultation - no rales, rhonchi or wheezes  CV: regular rate and rhythm, normal S1 S2, no S3 or S4, no murmur, click or rub, no peripheral edema  ABDOMEN: soft, nontender, no hepatosplenomegaly, no masses and bowel sounds normal  MS: no gross musculoskeletal defects noted, no edema  SKIN: no suspicious lesions or rashes  NEURO: Normal strength and tone, mentation intact and " speech normal  PSYCH: mentation appears normal, affect normal/bright         6/5/2025   Mini Cog   Clock Draw Score 2 Normal   3 Item Recall 3 objects recalled   Mini Cog Total Score 5             6/5/2025   Vision Screen   Vision Screen Results Pass   No Corrective Lenses, PLUS LENS REQUIRED REFER       Signed Electronically by: Bri Chilel MD

## 2025-06-09 ENCOUNTER — PATIENT OUTREACH (OUTPATIENT)
Dept: CARE COORDINATION | Facility: CLINIC | Age: 65
End: 2025-06-09
Payer: COMMERCIAL

## 2025-06-12 ENCOUNTER — RESULTS FOLLOW-UP (OUTPATIENT)
Dept: FAMILY MEDICINE | Facility: CLINIC | Age: 65
End: 2025-06-12

## 2025-08-06 DIAGNOSIS — I10 HYPERTENSION, UNSPECIFIED TYPE: ICD-10-CM

## 2025-08-06 RX ORDER — LISINOPRIL 40 MG/1
40 TABLET ORAL DAILY
Qty: 90 TABLET | Refills: 2 | Status: SHIPPED | OUTPATIENT
Start: 2025-08-06